# Patient Record
Sex: FEMALE | Race: WHITE | Employment: FULL TIME | URBAN - METROPOLITAN AREA
[De-identification: names, ages, dates, MRNs, and addresses within clinical notes are randomized per-mention and may not be internally consistent; named-entity substitution may affect disease eponyms.]

---

## 2017-01-17 RX ORDER — BUPROPION HYDROCHLORIDE 150 MG/1
TABLET ORAL
Qty: 30 TABLET | Refills: 1 | Status: SHIPPED | OUTPATIENT
Start: 2017-01-17 | End: 2017-03-14 | Stop reason: SDUPTHER

## 2017-03-14 ENCOUNTER — OFFICE VISIT (OUTPATIENT)
Dept: FAMILY MEDICINE CLINIC | Age: 57
End: 2017-03-14

## 2017-03-14 VITALS
RESPIRATION RATE: 12 BRPM | HEART RATE: 76 BPM | SYSTOLIC BLOOD PRESSURE: 138 MMHG | HEIGHT: 65 IN | DIASTOLIC BLOOD PRESSURE: 86 MMHG | WEIGHT: 188.38 LBS | BODY MASS INDEX: 31.39 KG/M2 | TEMPERATURE: 97.8 F

## 2017-03-14 DIAGNOSIS — R29.898 LEFT ARM WEAKNESS: ICD-10-CM

## 2017-03-14 DIAGNOSIS — M75.102 ROTATOR CUFF TEAR ARTHROPATHY, LEFT: ICD-10-CM

## 2017-03-14 DIAGNOSIS — I10 ESSENTIAL HYPERTENSION: Primary | ICD-10-CM

## 2017-03-14 DIAGNOSIS — M12.812 ROTATOR CUFF TEAR ARTHROPATHY, LEFT: ICD-10-CM

## 2017-03-14 DIAGNOSIS — E03.9 HYPOTHYROIDISM, UNSPECIFIED TYPE: ICD-10-CM

## 2017-03-14 PROCEDURE — 99213 OFFICE O/P EST LOW 20 MIN: CPT | Performed by: FAMILY MEDICINE

## 2017-03-14 RX ORDER — LEVOTHYROXINE SODIUM 0.05 MG/1
50 TABLET ORAL DAILY
Qty: 30 TABLET | Refills: 5 | Status: SHIPPED | OUTPATIENT
Start: 2017-03-14

## 2017-03-14 RX ORDER — ATORVASTATIN CALCIUM 10 MG/1
TABLET, FILM COATED ORAL
Qty: 30 TABLET | Refills: 5 | Status: SHIPPED | OUTPATIENT
Start: 2017-03-14

## 2017-03-14 RX ORDER — TRAMADOL HYDROCHLORIDE 50 MG/1
TABLET ORAL
Qty: 120 TABLET | Refills: 1 | Status: SHIPPED | OUTPATIENT
Start: 2017-03-14

## 2017-03-14 RX ORDER — BENAZEPRIL HYDROCHLORIDE 20 MG/1
TABLET ORAL
Qty: 30 TABLET | Refills: 5 | Status: SHIPPED | OUTPATIENT
Start: 2017-03-14

## 2017-03-14 RX ORDER — BUPROPION HYDROCHLORIDE 150 MG/1
TABLET ORAL
Qty: 30 TABLET | Refills: 5 | Status: SHIPPED | OUTPATIENT
Start: 2017-03-14 | End: 2017-06-07

## 2017-06-07 ENCOUNTER — TELEPHONE (OUTPATIENT)
Dept: FAMILY MEDICINE CLINIC | Age: 57
End: 2017-06-07

## 2017-06-07 RX ORDER — CITALOPRAM 20 MG/1
20 TABLET ORAL DAILY
Qty: 30 TABLET | Refills: 3 | Status: SHIPPED | OUTPATIENT
Start: 2017-06-07

## 2017-07-10 ENCOUNTER — TELEPHONE (OUTPATIENT)
Dept: FAMILY MEDICINE CLINIC | Age: 57
End: 2017-07-10

## 2017-07-10 RX ORDER — HYDROCHLOROTHIAZIDE 25 MG/1
TABLET ORAL
Qty: 30 TABLET | Refills: 2 | Status: SHIPPED | OUTPATIENT
Start: 2017-07-10

## 2017-08-08 RX ORDER — TRAMADOL HYDROCHLORIDE 50 MG/1
TABLET ORAL
Qty: 120 TABLET | Refills: 1 | OUTPATIENT
Start: 2017-08-08

## 2017-09-21 ENCOUNTER — OFFICE VISIT (OUTPATIENT)
Dept: INTERNAL MEDICINE | Facility: MEDICAL CENTER | Age: 57
End: 2017-09-21
Payer: COMMERCIAL

## 2017-09-21 VITALS
TEMPERATURE: 96.6 F | WEIGHT: 198.5 LBS | HEIGHT: 64 IN | OXYGEN SATURATION: 93 % | SYSTOLIC BLOOD PRESSURE: 146 MMHG | DIASTOLIC BLOOD PRESSURE: 98 MMHG | BODY MASS INDEX: 33.89 KG/M2 | HEART RATE: 71 BPM

## 2017-09-21 DIAGNOSIS — M19.012 OSTEOARTHRITIS OF LEFT SHOULDER, UNSPECIFIED OSTEOARTHRITIS TYPE: ICD-10-CM

## 2017-09-21 DIAGNOSIS — I10 ESSENTIAL HYPERTENSION: ICD-10-CM

## 2017-09-21 DIAGNOSIS — Z76.0 MEDICATION REFILL: ICD-10-CM

## 2017-09-21 DIAGNOSIS — F32.A DEPRESSION, UNSPECIFIED DEPRESSION TYPE: ICD-10-CM

## 2017-09-21 DIAGNOSIS — Z76.89 ESTABLISHING CARE WITH NEW DOCTOR, ENCOUNTER FOR: ICD-10-CM

## 2017-09-21 DIAGNOSIS — E03.9 HYPOTHYROIDISM, UNSPECIFIED TYPE: ICD-10-CM

## 2017-09-21 DIAGNOSIS — K21.9 GASTROESOPHAGEAL REFLUX DISEASE, ESOPHAGITIS PRESENCE NOT SPECIFIED: ICD-10-CM

## 2017-09-21 DIAGNOSIS — E66.9 OBESITY (BMI 30-39.9): ICD-10-CM

## 2017-09-21 PROCEDURE — 99000 SPECIMEN HANDLING OFFICE-LAB: CPT | Mod: GC | Performed by: INTERNAL MEDICINE

## 2017-09-21 PROCEDURE — 99204 OFFICE O/P NEW MOD 45 MIN: CPT | Mod: GC | Performed by: INTERNAL MEDICINE

## 2017-09-21 RX ORDER — TRAMADOL HYDROCHLORIDE 50 MG/1
50 TABLET ORAL EVERY 4 HOURS PRN
Qty: 10 TAB | Refills: 0 | Status: SHIPPED | OUTPATIENT
Start: 2017-09-21 | End: 2018-11-12

## 2017-09-21 RX ORDER — BENAZEPRIL HYDROCHLORIDE 20 MG/1
20 TABLET ORAL DAILY
COMMUNITY
End: 2017-12-19 | Stop reason: SDUPTHER

## 2017-09-21 RX ORDER — HYDROCHLOROTHIAZIDE 25 MG/1
25 TABLET ORAL DAILY
COMMUNITY
End: 2017-09-21 | Stop reason: SDUPTHER

## 2017-09-21 RX ORDER — BUPROPION HYDROCHLORIDE 150 MG/1
150 TABLET, EXTENDED RELEASE ORAL DAILY
COMMUNITY
End: 2017-09-21

## 2017-09-21 RX ORDER — DICLOFENAC SODIUM 75 MG/1
75 TABLET, DELAYED RELEASE ORAL 2 TIMES DAILY
COMMUNITY
End: 2018-11-12

## 2017-09-21 RX ORDER — TRAMADOL HYDROCHLORIDE 50 MG/1
50 TABLET ORAL EVERY 4 HOURS PRN
COMMUNITY
End: 2017-09-21

## 2017-09-21 RX ORDER — CITALOPRAM 20 MG/1
20 TABLET ORAL DAILY
Qty: 30 TAB | Refills: 1 | Status: SHIPPED | OUTPATIENT
Start: 2017-09-21 | End: 2017-12-19 | Stop reason: SDUPTHER

## 2017-09-21 RX ORDER — OMEPRAZOLE 20 MG/1
20 CAPSULE, DELAYED RELEASE ORAL DAILY
COMMUNITY
End: 2017-09-21

## 2017-09-21 RX ORDER — HYDROCHLOROTHIAZIDE 25 MG/1
25 TABLET ORAL DAILY
Qty: 30 TAB | Refills: 1 | Status: SHIPPED | OUTPATIENT
Start: 2017-09-21 | End: 2017-12-19 | Stop reason: SDUPTHER

## 2017-09-21 RX ORDER — CITALOPRAM 20 MG/1
20 TABLET ORAL DAILY
COMMUNITY
End: 2017-09-21 | Stop reason: SDUPTHER

## 2017-09-21 RX ORDER — LEVOTHYROXINE SODIUM 0.05 MG/1
50 TABLET ORAL
COMMUNITY
End: 2017-12-19 | Stop reason: SDUPTHER

## 2017-09-21 ASSESSMENT — PATIENT HEALTH QUESTIONNAIRE - PHQ9: CLINICAL INTERPRETATION OF PHQ2 SCORE: 0

## 2017-09-21 NOTE — PROGRESS NOTES
New Patient to Establish    Reason to establish: New patient to establish    CC: Establish care with new doctor, and consult about hypertension and weight gain.    HPI: 57 y.o. Female with past medical history of hypertension, hypothyroidism, bilateral knee replacement, osteoarthritis, obesity, and GERD. Presents to the clinic to establish care and to consult about hypertension and weight gain. The patient moved recently to [place] on 8/2017 because of new job at [employer].   The last mammography was on 2016 and it was normal. The patient refused colonoscopy but she had a negative.  Pt denies headache, blurred vision, fatigue, constipation, cold intolerance, skin changes, or hair loss.    Patient Active Problem List    Diagnosis Date Noted   • Essential hypertension 09/21/2017   • Osteoarthritis of left shoulder 09/21/2017   • Obesity (BMI 30-39.9) 09/21/2017   • Establishing care with new doctor, encounter for 09/21/2017   • Hypothyroidism 09/21/2017   • Depression 09/21/2017       Past Medical History:   Diagnosis Date   • HTN (hypertension)    • Hypothyroidism        Current Outpatient Prescriptions   Medication Sig Dispense Refill   • levothyroxine (SYNTHROID) 50 MCG Tab Take 50 mcg by mouth Every morning on an empty stomach.     • benazepril (LOTENSIN) 20 MG Tab Take 20 mg by mouth every day.     • diclofenac EC (VOLTAREN) 75 MG Tablet Delayed Response Take 75 mg by mouth 2 times a day.     • hydrochlorothiazide (HYDRODIURIL) 25 MG Tab Take 1 Tab by mouth every day. 30 Tab 1   • citalopram (CELEXA) 20 MG Tab Take 1 Tab by mouth every day. 30 Tab 1   • tramadol (ULTRAM) 50 MG Tab Take 1 Tab by mouth every four hours as needed. 10 Tab 0     No current facility-administered medications for this visit.        Allergies as of 09/21/2017   • (No Known Allergies)       Social History     Social History   • Marital status: Single     Spouse name: N/A   • Number of children: N/A   • Years of education: N/A     Occupational  "History   • Not on file.     Social History Main Topics   • Smoking status: Former Smoker     Packs/day: 1.50     Years: 25.00     Types: Cigarettes     Quit date: 10/2004   • Smokeless tobacco: Never Used   • Alcohol use Not on file   • Drug use: Unknown   • Sexual activity: Not on file     Other Topics Concern   • Not on file     Social History Narrative   • No narrative on file       Family History   Problem Relation Age of Onset   • Diabetes Mother    • Hypertension Mother    • Hypertension Father        No past surgical history on file.    ROS: As per HPI. Additional pertinent symptoms as noted below.    All others negative    /98   Pulse 71   Temp 35.9 °C (96.6 °F)   Ht 1.626 m (5' 4\")   Wt 90 kg (198 lb 8 oz)   SpO2 93%   BMI 34.07 kg/m²     Physical Exam  General:  Alert and oriented, No apparent distress.    Eyes: Pupils equal and reactive. No scleral icterus.    Throat: Clear no erythema or exudates noted.    Neck: Supple. No lymphadenopathy noted. Thyroid not enlarged.    Lungs: Clear to auscultation and percussion bilaterally.    Cardiovascular: Regular rate and rhythm. No murmurs, rubs or gallops.    Abdomen:  Benign. No rebound or guarding noted.    Extremities: No clubbing, cyanosis, edema. Tenderness on palpation of the left shoulder joint, no edema , positive limitation of movement of the joint, numbness of the lateral side of the arm and forearm, no muscle weakness or fasciculation.    Skin: Clear. No rash or suspicious skin lesions noted.    Other:     Note: I have reviewed all pertinent labs and diagnostic tests associated with this visit with specific comments listed under the assessment and plan below    Assessment and Plan    1. Essential hypertension  - Hx of HTN controlled by antihypertensive medications  - On hydrochlorothiazide 25 mg daily, and benazepril 20 mg daily.  - Today Bp reading is 146/98.  - Denies headache, blurred vision, chest pain, nausea or vomiting.  - " Impression: under controlled hypertension  Plan  - Educate the patient on how to measure the Bp at home and bring a hypertension log with her on next visit.  - Encourage the pt to loss weight as that will help in controlling the blood pressure and improve her health in general.  - CBC, CMP and follow up in one month.    2. Osteoarthritis of left shoulder, unspecified osteoarthritis type  - Hx of chronic pain in the left shoulder pain  - No Hx of trauma or dislocation.  - Hx of previous evaluation by Orthopedic surgeon in [place], he advice to do shoulder joint replacement.  Plan  - Referral to Orthopedic surgeon in [place] to evaluate and treat.  - Refill Tramadol tablet.    3. Obesity (BMI 30-39.9)  - Current BMI is 34.07.  - Hx of depending on junk food for daily basis.  - After moving to [place] on 8/2017, the patient starts eating more vegetables and healthy food.  Plan  - Encourage and educate the patient about the benefit of eating healthy food and losing weight.  - Lipid panel  - CBC and CMP  - Follow up in one month    4. Establishing care with new doctor, encounter for  - Pt recently moved to [place] on 8/2017 and she wants to establish care with new physician.  Plan  - Control Bp  - Control hypothyroidism  - CBC, CMP, TSH, and Lipid panel.    5. Hypothyroidism, unspecified type  - On Levothyroxine  - last TSH was 5 months ago (Pt does not remember the result)  - Denies fatigue, constipation, cold intolerance, or hair falling.  - Recent history of weight gain ( 9 pounds).  Plan  - Repeat the TSH and follow up.    6. Depression, unspecified depression type  - Hx of depression that is well controlled with Citalopram.  - Denies depressed mood, suicide attempt or ideation.  - Hx of Whiskey abuse and methamphetamine abuse (Sober since 2004).  Plan  - Refill medication.  - Follow up.    7. Medication refill  - Pt establishing care and needs refill on medications  Plan  - Refill medication and adjust the dose after receiving  the lab results and evaluation the response of the patient.      Followup: Return in about 1 month (around 10/21/2017).    Risk Assessment (discuss potential complications a function of chronic problems): risk assessment has been discussed with the patient.    Complexity (discuss number of co-morbidities): Co-Morbidities have been discussed with the patient.    Signed by: Hilaria Santillan M.D.

## 2017-09-21 NOTE — LETTER
[facility]  Hilaria Santillan M.D.  [address]  [place] [address]  Fax: [phone]   Authorization for Release/Disclosure of   Protected Health Information   Name: ZAINAB GUY : 1960 SSN: [id number]   Address: [address] [place] [address]  [place] [address] Phone:    [phone] (home)    I authorize the entity listed below to release/disclose the PHI below to:   [facility]/Hilaria Santillan M.D. and Hilaria Santillan M.D.   Provider or Entity Name: Dr Bravo     [place], [place]   Phone:      Fax:     Reason for request: continuity of care   Information to be released:    [ X ] LAST COLONOSCOPY,  including any PATH REPORT and follow-up  [  ] LAST FIT/COLOGUARD RESULT [X ] LAST DEXA  [ X ] LAST MAMMOGRAM  [X ] LAST PAP  [ X ] LAST LABS [  ] RETINA EXAM REPORT  [ X ] IMMUNIZATION RECORDS  [X ] Release all info      [  ] Check here and initial the line next to each item to release ALL health information INCLUDING  _____ Care and treatment for drug and / or alcohol abuse  _____ HIV testing, infection status, or AIDS  _____ Genetic Testing    DATES OF SERVICE OR TIME PERIOD TO BE DISCLOSED: _____________  I understand and acknowledge that:  * This Authorization may be revoked at any time by you in writing, except if your health information has already been used or disclosed.  * Your health information that will be used or disclosed as a result of you signing this authorization could be re-disclosed by the recipient. If this occurs, your re-disclosed health information may no longer be protected by State or Federal laws.  * You may refuse to sign this Authorization. Your refusal will not affect your ability to obtain treatment.  * This Authorization becomes effective upon signing and will  on (date) __________.      If no date is indicated, this Authorization will  one (1) year from the signature date.    Name: Zainab Guy    Signature:   Date:     9/21/2017       PLEASE FAX REQUESTED RECORDS BACK TO: [phone]

## 2017-10-12 ENCOUNTER — APPOINTMENT (OUTPATIENT)
Dept: INTERNAL MEDICINE | Facility: MEDICAL CENTER | Age: 57
End: 2017-10-12
Payer: COMMERCIAL

## 2017-12-19 ENCOUNTER — OFFICE VISIT (OUTPATIENT)
Dept: MEDICAL GROUP | Facility: MEDICAL CENTER | Age: 57
End: 2017-12-19
Payer: COMMERCIAL

## 2017-12-19 ENCOUNTER — HOSPITAL ENCOUNTER (OUTPATIENT)
Dept: LAB | Facility: MEDICAL CENTER | Age: 57
End: 2017-12-19
Attending: NURSE PRACTITIONER
Payer: COMMERCIAL

## 2017-12-19 VITALS
OXYGEN SATURATION: 95 % | SYSTOLIC BLOOD PRESSURE: 142 MMHG | RESPIRATION RATE: 16 BRPM | WEIGHT: 203 LBS | TEMPERATURE: 97.2 F | HEIGHT: 64 IN | BODY MASS INDEX: 34.66 KG/M2 | DIASTOLIC BLOOD PRESSURE: 74 MMHG | HEART RATE: 80 BPM

## 2017-12-19 DIAGNOSIS — E66.9 OBESITY (BMI 30-39.9): ICD-10-CM

## 2017-12-19 DIAGNOSIS — M15.9 OSTEOARTHRITIS OF MULTIPLE JOINTS, UNSPECIFIED OSTEOARTHRITIS TYPE: ICD-10-CM

## 2017-12-19 DIAGNOSIS — K21.9 GASTROESOPHAGEAL REFLUX DISEASE WITHOUT ESOPHAGITIS: ICD-10-CM

## 2017-12-19 DIAGNOSIS — I10 ESSENTIAL HYPERTENSION: ICD-10-CM

## 2017-12-19 DIAGNOSIS — E03.4 HYPOTHYROIDISM DUE TO ACQUIRED ATROPHY OF THYROID: ICD-10-CM

## 2017-12-19 DIAGNOSIS — F33.42 RECURRENT MAJOR DEPRESSIVE DISORDER, IN FULL REMISSION (HCC): ICD-10-CM

## 2017-12-19 DIAGNOSIS — Z12.39 SCREENING FOR BREAST CANCER: ICD-10-CM

## 2017-12-19 PROBLEM — F32.A DEPRESSION: Status: RESOLVED | Noted: 2017-09-21 | Resolved: 2017-12-19

## 2017-12-19 PROBLEM — E03.9 HYPOTHYROIDISM: Status: RESOLVED | Noted: 2017-09-21 | Resolved: 2017-12-19

## 2017-12-19 PROBLEM — M19.012 OSTEOARTHRITIS OF LEFT SHOULDER: Status: RESOLVED | Noted: 2017-09-21 | Resolved: 2017-12-19

## 2017-12-19 PROBLEM — Z76.89 ESTABLISHING CARE WITH NEW DOCTOR, ENCOUNTER FOR: Status: RESOLVED | Noted: 2017-09-21 | Resolved: 2017-12-19

## 2017-12-19 LAB
ALBUMIN SERPL BCP-MCNC: 4 G/DL (ref 3.2–4.9)
ALBUMIN/GLOB SERPL: 1.4 G/DL
ALP SERPL-CCNC: 106 U/L (ref 30–99)
ALT SERPL-CCNC: 144 U/L (ref 2–50)
ANION GAP SERPL CALC-SCNC: 11 MMOL/L (ref 0–11.9)
AST SERPL-CCNC: 59 U/L (ref 12–45)
BILIRUB SERPL-MCNC: 0.4 MG/DL (ref 0.1–1.5)
BUN SERPL-MCNC: 16 MG/DL (ref 8–22)
CALCIUM SERPL-MCNC: 9.4 MG/DL (ref 8.5–10.5)
CHLORIDE SERPL-SCNC: 107 MMOL/L (ref 96–112)
CHOLEST SERPL-MCNC: 378 MG/DL (ref 100–199)
CO2 SERPL-SCNC: 22 MMOL/L (ref 20–33)
CREAT SERPL-MCNC: 0.62 MG/DL (ref 0.5–1.4)
GFR SERPL CREATININE-BSD FRML MDRD: >60 ML/MIN/1.73 M 2
GLOBULIN SER CALC-MCNC: 2.9 G/DL (ref 1.9–3.5)
GLUCOSE SERPL-MCNC: 95 MG/DL (ref 65–99)
HDLC SERPL-MCNC: 30 MG/DL
LDLC SERPL CALC-MCNC: ABNORMAL MG/DL
POTASSIUM SERPL-SCNC: 3.9 MMOL/L (ref 3.6–5.5)
PROT SERPL-MCNC: 6.9 G/DL (ref 6–8.2)
SODIUM SERPL-SCNC: 140 MMOL/L (ref 135–145)
TRIGL SERPL-MCNC: 731 MG/DL (ref 0–149)
TSH SERPL DL<=0.005 MIU/L-ACNC: 2.93 UIU/ML (ref 0.38–5.33)

## 2017-12-19 PROCEDURE — 84443 ASSAY THYROID STIM HORMONE: CPT

## 2017-12-19 PROCEDURE — 80053 COMPREHEN METABOLIC PANEL: CPT

## 2017-12-19 PROCEDURE — 80061 LIPID PANEL: CPT

## 2017-12-19 PROCEDURE — 99214 OFFICE O/P EST MOD 30 MIN: CPT | Performed by: NURSE PRACTITIONER

## 2017-12-19 PROCEDURE — 36415 COLL VENOUS BLD VENIPUNCTURE: CPT

## 2017-12-19 RX ORDER — LEVOTHYROXINE SODIUM 0.05 MG/1
50 TABLET ORAL
Qty: 30 TAB | Refills: 11 | Status: SHIPPED | OUTPATIENT
Start: 2017-12-19 | End: 2018-11-26 | Stop reason: SDUPTHER

## 2017-12-19 RX ORDER — HYDROCHLOROTHIAZIDE 25 MG/1
25 TABLET ORAL DAILY
Qty: 30 TAB | Refills: 11 | Status: SHIPPED | OUTPATIENT
Start: 2017-12-19 | End: 2018-11-26 | Stop reason: SDUPTHER

## 2017-12-19 RX ORDER — BENAZEPRIL HYDROCHLORIDE 20 MG/1
20 TABLET ORAL DAILY
Qty: 30 TAB | Refills: 11 | Status: SHIPPED | OUTPATIENT
Start: 2017-12-19 | End: 2018-11-26 | Stop reason: SDUPTHER

## 2017-12-19 RX ORDER — CITALOPRAM 20 MG/1
20 TABLET ORAL DAILY
Qty: 30 TAB | Refills: 11 | Status: SHIPPED | OUTPATIENT
Start: 2017-12-19 | End: 2018-11-26 | Stop reason: SDUPTHER

## 2017-12-19 RX ORDER — OMEPRAZOLE 20 MG/1
20 CAPSULE, DELAYED RELEASE ORAL DAILY
Qty: 30 CAP | COMMUNITY
Start: 2017-12-19 | End: 2023-10-01

## 2017-12-19 ASSESSMENT — ENCOUNTER SYMPTOMS
DEPRESSION: 1
HEARTBURN: 1

## 2017-12-19 NOTE — PROGRESS NOTES
Subjective:      Zainab Guy is a 57 y.o. female who presents with Establish Care and Medication Refill            HPI Zainab Guy Is here today to establish care and for need of medications and lab work. She recently moved to the area from [place] and was seen once at the [facility] medical clinic.      1. Essential hypertension  Patient currently uses benazepril 20 mg and HCTZ 25 mg. Her blood pressure appears mildly elevated but she states this is because of anxiety and her home readings are running better. She would possibly like to switch off HCTZ in the future because she has occasional urinary incontinence due to urgency.    2. Osteoarthritis of multiple joints, unspecified osteoarthritis type  Patient followed by orthopedics and they provide her diclofenac and tramadol which she uses for multiple joint pain but she states she has no rheumatoid arthritis.    3. Hypothyroidism due to acquired atrophy of thyroid  Patient currently on levothyroxine 50 µg and is due for TSH testing.    4. Recurrent major depressive disorder, in full remission (CMS-HCC)  Patient takes citalopram 20 mg daily and finds it helpful.    5. Gastroesophageal reflux disease without esophagitis  Patient uses over-the-counter omeprazole and does not feel she needs anything else.      Social History   Substance Use Topics   • Smoking status: Former Smoker     Packs/day: 1.50     Years: 25.00     Types: Cigarettes     Quit date: 10/2004   • Smokeless tobacco: Never Used   • Alcohol use No     Current Outpatient Prescriptions   Medication Sig Dispense Refill   • omeprazole (PRILOSEC) 20 MG delayed-release capsule Take 1 Cap by mouth every day. 30 Cap    • levothyroxine (SYNTHROID) 50 MCG Tab Take 1 Tab by mouth Every morning on an empty stomach. 30 Tab 11   • benazepril (LOTENSIN) 20 MG Tab Take 1 Tab by mouth every day. 30 Tab 11   • hydrochlorothiazide (HYDRODIURIL) 25 MG Tab Take 1 Tab by mouth every day. 30 Tab 11   • citalopram  "(CELEXA) 20 MG Tab Take 1 Tab by mouth every day. 30 Tab 11   • diclofenac EC (VOLTAREN) 75 MG Tablet Delayed Response Take 75 mg by mouth 2 times a day.     • tramadol (ULTRAM) 50 MG Tab Take 1 Tab by mouth every four hours as needed. 10 Tab 0     No current facility-administered medications for this visit.      Family History   Problem Relation Age of Onset   • Diabetes Mother    • Hypertension Mother    • Hypertension Father      Past Medical History:   Diagnosis Date   • GERD (gastroesophageal reflux disease)    • HTN (hypertension)    • Hypothyroidism    • Obesity    • Osteoarthritis        Review of Systems   Gastrointestinal: Positive for heartburn.   Musculoskeletal: Positive for joint pain.   Psychiatric/Behavioral: Positive for depression.   All other systems reviewed and are negative.         Objective:     /74   Pulse 80   Temp 36.2 °C (97.2 °F)   Resp 16   Ht 1.626 m (5' 4\")   Wt 92.1 kg (203 lb)   SpO2 95%   BMI 34.84 kg/m²      Physical Exam   Constitutional: She is oriented to person, place, and time. She appears well-developed and well-nourished. No distress.   HENT:   Head: Normocephalic and atraumatic.   Right Ear: External ear normal.   Left Ear: External ear normal.   Nose: Nose normal.   Eyes: Right eye exhibits no discharge. Left eye exhibits no discharge.   Neck: Normal range of motion. Neck supple. No thyromegaly present.   Cardiovascular: Normal rate, regular rhythm and normal heart sounds.  Exam reveals no gallop and no friction rub.    No murmur heard.  Pulmonary/Chest: Effort normal and breath sounds normal. She has no wheezes. She has no rales.   Musculoskeletal: She exhibits no edema or tenderness.   Neurological: She is alert and oriented to person, place, and time. She displays normal reflexes.   Skin: Skin is warm and dry. No rash noted. She is not diaphoretic.   Psychiatric: She has a normal mood and affect. Her behavior is normal. Judgment and thought content normal. "   Nursing note and vitals reviewed.              Assessment/Plan:     1. Essential hypertension  Patient will continue on same medication for now but I advised her that if her blood pressure continues to above 140/90, we should consider other medication. I advised her that we should also consider decreasing or stopping her HCTZ which may be contributing to her urinary incontinence. She will make a follow-up appointment for this.  - COMP METABOLIC PANEL; Future  - LIPID PROFILE; Future  - benazepril (LOTENSIN) 20 MG Tab; Take 1 Tab by mouth every day.  Dispense: 30 Tab; Refill: 11  - hydrochlorothiazide (HYDRODIURIL) 25 MG Tab; Take 1 Tab by mouth every day.  Dispense: 30 Tab; Refill: 11    2. Osteoarthritis of multiple joints, unspecified osteoarthritis type  Patient states she will get her tramadol from orthopedics because it is a controlled substance. She did do a recent urine drug screen for [facility] which was appropriate. I advised her to use her anti-inflammatories only when needed to prevent GI upset.    3. Hypothyroidism due to acquired atrophy of thyroid  Medicine refilled but she needs a TSH to evaluate her needs.  - TSH; Future  - levothyroxine (SYNTHROID) 50 MCG Tab; Take 1 Tab by mouth Every morning on an empty stomach.  Dispense: 30 Tab; Refill: 11    4. Recurrent major depressive disorder, in full remission (CMS-HCC)  Patient states she is happy with Celexa and does not want counseling.  - citalopram (CELEXA) 20 MG Tab; Take 1 Tab by mouth every day.  Dispense: 30 Tab; Refill: 11    5. Gastroesophageal reflux disease without esophagitis  Patient happy with over-the-counter medicine.  - omeprazole (PRILOSEC) 20 MG delayed-release capsule; Take 1 Cap by mouth every day.  Dispense: 30 Cap    6. Screening for breast cancer    - MA-MAMMO SCREENING BILAT W/TEJAS W/CAD; Future    7. Obesity (BMI 30-39.9)    - Patient identified as having weight management issue.  Appropriate orders and counseling given.

## 2018-06-04 LAB
ALBUMIN SERPL-MCNC: 3.8 G/DL (ref 3.4–5)
ALP SERPL-CCNC: 92 U/L (ref 45–117)
ALT SERPL-CCNC: 55 U/L (ref 12–78)
ANION GAP SERPL CALC-SCNC: 8 MMOL/L (ref 5–15)
BILIRUB SERPL-MCNC: 0.6 MG/DL (ref 0.2–1)
CALCIUM SERPL-MCNC: 9.1 MG/DL (ref 8.5–10.1)
CHLORIDE SERPL-SCNC: 106 MMOL/L (ref 98–107)
CREAT SERPL-MCNC: 0.84 MG/DL (ref 0.55–1.02)
PROT SERPL-MCNC: 7.7 G/DL (ref 6.4–8.2)

## 2018-06-07 ENCOUNTER — HOSPITAL ENCOUNTER (INPATIENT)
Dept: HOSPITAL 8 - ORIP | Age: 58
Discharge: HOME | DRG: 483 | End: 2018-06-07
Attending: ORTHOPAEDIC SURGERY | Admitting: ORTHOPAEDIC SURGERY
Payer: COMMERCIAL

## 2018-06-07 VITALS — DIASTOLIC BLOOD PRESSURE: 86 MMHG | SYSTOLIC BLOOD PRESSURE: 132 MMHG

## 2018-06-07 VITALS — BODY MASS INDEX: 32.91 KG/M2 | HEIGHT: 65 IN | WEIGHT: 197.53 LBS

## 2018-06-07 DIAGNOSIS — J45.909: ICD-10-CM

## 2018-06-07 DIAGNOSIS — M19.012: Primary | ICD-10-CM

## 2018-06-07 DIAGNOSIS — Z96.653: ICD-10-CM

## 2018-06-07 DIAGNOSIS — Z90.710: ICD-10-CM

## 2018-06-07 DIAGNOSIS — I10: ICD-10-CM

## 2018-06-07 DIAGNOSIS — M75.22: ICD-10-CM

## 2018-06-07 PROCEDURE — 0RRK0JZ REPLACEMENT OF LEFT SHOULDER JOINT WITH SYNTHETIC SUBSTITUTE, OPEN APPROACH: ICD-10-PCS | Performed by: ORTHOPAEDIC SURGERY

## 2018-06-07 PROCEDURE — 36415 COLL VENOUS BLD VENIPUNCTURE: CPT

## 2018-06-07 PROCEDURE — C1713 ANCHOR/SCREW BN/BN,TIS/BN: HCPCS

## 2018-06-07 PROCEDURE — 0LS40ZZ REPOSITION LEFT UPPER ARM TENDON, OPEN APPROACH: ICD-10-PCS | Performed by: ORTHOPAEDIC SURGERY

## 2018-06-07 PROCEDURE — C1776 JOINT DEVICE (IMPLANTABLE): HCPCS

## 2018-06-07 PROCEDURE — 80053 COMPREHEN METABOLIC PANEL: CPT

## 2018-06-07 RX ADMIN — OXYCODONE HYDROCHLORIDE AND ACETAMINOPHEN SCH TAB: 10; 325 TABLET ORAL at 14:39

## 2018-06-07 RX ADMIN — OXYCODONE HYDROCHLORIDE AND ACETAMINOPHEN SCH TAB: 10; 325 TABLET ORAL at 11:00

## 2018-11-12 ENCOUNTER — APPOINTMENT (OUTPATIENT)
Dept: RADIOLOGY | Facility: MEDICAL CENTER | Age: 58
End: 2018-11-12
Attending: EMERGENCY MEDICINE
Payer: COMMERCIAL

## 2018-11-12 ENCOUNTER — APPOINTMENT (OUTPATIENT)
Dept: RADIOLOGY | Facility: MEDICAL CENTER | Age: 58
End: 2018-11-12
Attending: INTERNAL MEDICINE
Payer: COMMERCIAL

## 2018-11-12 ENCOUNTER — APPOINTMENT (OUTPATIENT)
Dept: CARDIOLOGY | Facility: MEDICAL CENTER | Age: 58
End: 2018-11-12
Attending: INTERNAL MEDICINE
Payer: COMMERCIAL

## 2018-11-12 ENCOUNTER — HOSPITAL ENCOUNTER (OUTPATIENT)
Facility: MEDICAL CENTER | Age: 58
End: 2018-11-13
Attending: EMERGENCY MEDICINE | Admitting: INTERNAL MEDICINE
Payer: COMMERCIAL

## 2018-11-12 ENCOUNTER — TELEPHONE (OUTPATIENT)
Dept: MEDICAL GROUP | Facility: MEDICAL CENTER | Age: 58
End: 2018-11-12

## 2018-11-12 ENCOUNTER — OFFICE VISIT (OUTPATIENT)
Dept: MEDICAL GROUP | Facility: MEDICAL CENTER | Age: 58
End: 2018-11-12
Payer: COMMERCIAL

## 2018-11-12 VITALS
HEIGHT: 64 IN | TEMPERATURE: 98.2 F | WEIGHT: 201 LBS | OXYGEN SATURATION: 95 % | BODY MASS INDEX: 34.31 KG/M2 | RESPIRATION RATE: 16 BRPM | DIASTOLIC BLOOD PRESSURE: 76 MMHG | SYSTOLIC BLOOD PRESSURE: 130 MMHG | HEART RATE: 83 BPM

## 2018-11-12 DIAGNOSIS — E78.2 MIXED HYPERLIPIDEMIA: ICD-10-CM

## 2018-11-12 DIAGNOSIS — R07.89 CHEST PRESSURE: ICD-10-CM

## 2018-11-12 DIAGNOSIS — R74.01 ELEVATED TRANSAMINASE LEVEL: ICD-10-CM

## 2018-11-12 DIAGNOSIS — E03.4 HYPOTHYROIDISM DUE TO ACQUIRED ATROPHY OF THYROID: ICD-10-CM

## 2018-11-12 DIAGNOSIS — I10 ESSENTIAL HYPERTENSION: ICD-10-CM

## 2018-11-12 PROBLEM — R01.1 NEWLY RECOGNIZED HEART MURMUR: Status: ACTIVE | Noted: 2018-11-12

## 2018-11-12 PROBLEM — R07.9 PAIN IN THE CHEST: Status: ACTIVE | Noted: 2018-11-12

## 2018-11-12 LAB
ALBUMIN SERPL BCP-MCNC: 4.4 G/DL (ref 3.2–4.9)
ALBUMIN/GLOB SERPL: 1.4 G/DL
ALP SERPL-CCNC: 87 U/L (ref 30–99)
ALT SERPL-CCNC: 77 U/L (ref 2–50)
ANION GAP SERPL CALC-SCNC: 11 MMOL/L (ref 0–11.9)
APTT PPP: 27.6 SEC (ref 24.7–36)
AST SERPL-CCNC: 47 U/L (ref 12–45)
BASOPHILS # BLD AUTO: 0.6 % (ref 0–1.8)
BASOPHILS # BLD: 0.04 K/UL (ref 0–0.12)
BILIRUB SERPL-MCNC: 0.5 MG/DL (ref 0.1–1.5)
BNP SERPL-MCNC: 24 PG/ML (ref 0–100)
BUN SERPL-MCNC: 14 MG/DL (ref 8–22)
CALCIUM SERPL-MCNC: 10 MG/DL (ref 8.5–10.5)
CHLORIDE SERPL-SCNC: 104 MMOL/L (ref 96–112)
CK SERPL-CCNC: 123 U/L (ref 0–154)
CO2 SERPL-SCNC: 24 MMOL/L (ref 20–33)
CORTIS SERPL-MCNC: 3.2 UG/DL (ref 0–23)
CREAT SERPL-MCNC: 0.72 MG/DL (ref 0.5–1.4)
D DIMER PPP IA.FEU-MCNC: 0.43 UG/ML (FEU) (ref 0–0.5)
EKG IMPRESSION: NORMAL
EKG IMPRESSION: NORMAL
EOSINOPHIL # BLD AUTO: 0.21 K/UL (ref 0–0.51)
EOSINOPHIL NFR BLD: 3.1 % (ref 0–6.9)
ERYTHROCYTE [DISTWIDTH] IN BLOOD BY AUTOMATED COUNT: 41.4 FL (ref 35.9–50)
FERRITIN SERPL-MCNC: 44.4 NG/ML (ref 10–291)
GLOBULIN SER CALC-MCNC: 3.2 G/DL (ref 1.9–3.5)
GLUCOSE SERPL-MCNC: 95 MG/DL (ref 65–99)
HAV IGM SERPL QL IA: NEGATIVE
HBV CORE IGM SER QL: NEGATIVE
HBV SURFACE AG SER QL: NEGATIVE
HCT VFR BLD AUTO: 41.2 % (ref 37–47)
HCV AB SER QL: NEGATIVE
HGB BLD-MCNC: 14 G/DL (ref 12–16)
IMM GRANULOCYTES # BLD AUTO: 0.02 K/UL (ref 0–0.11)
IMM GRANULOCYTES NFR BLD AUTO: 0.3 % (ref 0–0.9)
INR PPP: 1 (ref 0.87–1.13)
LIPASE SERPL-CCNC: 43 U/L (ref 11–82)
LV EJECT FRACT  99904: 65
LV EJECT FRACT MOD 2C 99903: 58.29
LV EJECT FRACT MOD 4C 99902: 63.95
LV EJECT FRACT MOD BP 99901: 61.35
LYMPHOCYTES # BLD AUTO: 1.98 K/UL (ref 1–4.8)
LYMPHOCYTES NFR BLD: 29.6 % (ref 22–41)
MAGNESIUM SERPL-MCNC: 2.1 MG/DL (ref 1.5–2.5)
MCH RBC QN AUTO: 28.1 PG (ref 27–33)
MCHC RBC AUTO-ENTMCNC: 34 G/DL (ref 33.6–35)
MCV RBC AUTO: 82.7 FL (ref 81.4–97.8)
MONOCYTES # BLD AUTO: 0.48 K/UL (ref 0–0.85)
MONOCYTES NFR BLD AUTO: 7.2 % (ref 0–13.4)
NEUTROPHILS # BLD AUTO: 3.95 K/UL (ref 2–7.15)
NEUTROPHILS NFR BLD: 59.2 % (ref 44–72)
NRBC # BLD AUTO: 0 K/UL
NRBC BLD-RTO: 0 /100 WBC
PLATELET # BLD AUTO: 274 K/UL (ref 164–446)
PMV BLD AUTO: 9 FL (ref 9–12.9)
POTASSIUM SERPL-SCNC: 3.7 MMOL/L (ref 3.6–5.5)
PROT SERPL-MCNC: 7.6 G/DL (ref 6–8.2)
PROTHROMBIN TIME: 13.3 SEC (ref 12–14.6)
RBC # BLD AUTO: 4.98 M/UL (ref 4.2–5.4)
SODIUM SERPL-SCNC: 139 MMOL/L (ref 135–145)
TROPONIN I SERPL-MCNC: <0.01 NG/ML (ref 0–0.04)
TSH SERPL DL<=0.005 MIU/L-ACNC: 1.72 UIU/ML (ref 0.38–5.33)
WBC # BLD AUTO: 6.7 K/UL (ref 4.8–10.8)

## 2018-11-12 PROCEDURE — 306588 SLEEVE,VASO CALF MED: Performed by: INTERNAL MEDICINE

## 2018-11-12 PROCEDURE — 71045 X-RAY EXAM CHEST 1 VIEW: CPT

## 2018-11-12 PROCEDURE — 83735 ASSAY OF MAGNESIUM: CPT

## 2018-11-12 PROCEDURE — 99220 PR INITIAL OBSERVATION CARE,LEVL III: CPT | Performed by: INTERNAL MEDICINE

## 2018-11-12 PROCEDURE — G0378 HOSPITAL OBSERVATION PER HR: HCPCS

## 2018-11-12 PROCEDURE — 93306 TTE W/DOPPLER COMPLETE: CPT

## 2018-11-12 PROCEDURE — 85730 THROMBOPLASTIN TIME PARTIAL: CPT

## 2018-11-12 PROCEDURE — 700102 HCHG RX REV CODE 250 W/ 637 OVERRIDE(OP): Performed by: INTERNAL MEDICINE

## 2018-11-12 PROCEDURE — 85610 PROTHROMBIN TIME: CPT

## 2018-11-12 PROCEDURE — 36415 COLL VENOUS BLD VENIPUNCTURE: CPT

## 2018-11-12 PROCEDURE — 93010 ELECTROCARDIOGRAM REPORT: CPT | Performed by: INTERNAL MEDICINE

## 2018-11-12 PROCEDURE — 83690 ASSAY OF LIPASE: CPT

## 2018-11-12 PROCEDURE — 85025 COMPLETE CBC W/AUTO DIFF WBC: CPT

## 2018-11-12 PROCEDURE — 93005 ELECTROCARDIOGRAM TRACING: CPT

## 2018-11-12 PROCEDURE — 84443 ASSAY THYROID STIM HORMONE: CPT

## 2018-11-12 PROCEDURE — A9270 NON-COVERED ITEM OR SERVICE: HCPCS | Performed by: INTERNAL MEDICINE

## 2018-11-12 PROCEDURE — 83036 HEMOGLOBIN GLYCOSYLATED A1C: CPT

## 2018-11-12 PROCEDURE — 80074 ACUTE HEPATITIS PANEL: CPT

## 2018-11-12 PROCEDURE — 99213 OFFICE O/P EST LOW 20 MIN: CPT | Performed by: NURSE PRACTITIONER

## 2018-11-12 PROCEDURE — 82533 TOTAL CORTISOL: CPT

## 2018-11-12 PROCEDURE — 82550 ASSAY OF CK (CPK): CPT

## 2018-11-12 PROCEDURE — 80053 COMPREHEN METABOLIC PANEL: CPT

## 2018-11-12 PROCEDURE — 80307 DRUG TEST PRSMV CHEM ANLYZR: CPT

## 2018-11-12 PROCEDURE — 83880 ASSAY OF NATRIURETIC PEPTIDE: CPT

## 2018-11-12 PROCEDURE — 82728 ASSAY OF FERRITIN: CPT

## 2018-11-12 PROCEDURE — 99285 EMERGENCY DEPT VISIT HI MDM: CPT

## 2018-11-12 PROCEDURE — 84484 ASSAY OF TROPONIN QUANT: CPT

## 2018-11-12 PROCEDURE — 93005 ELECTROCARDIOGRAM TRACING: CPT | Performed by: INTERNAL MEDICINE

## 2018-11-12 PROCEDURE — 93005 ELECTROCARDIOGRAM TRACING: CPT | Performed by: EMERGENCY MEDICINE

## 2018-11-12 PROCEDURE — 93306 TTE W/DOPPLER COMPLETE: CPT | Mod: 26 | Performed by: INTERNAL MEDICINE

## 2018-11-12 PROCEDURE — 85379 FIBRIN DEGRADATION QUANT: CPT

## 2018-11-12 RX ORDER — FENOPROFEN CALCIUM 200 MG/1
1 CAPSULE ORAL 4 TIMES DAILY
COMMUNITY
Start: 2018-08-22 | End: 2018-11-12

## 2018-11-12 RX ORDER — ATORVASTATIN CALCIUM 80 MG/1
80 TABLET, FILM COATED ORAL
Status: DISCONTINUED | OUTPATIENT
Start: 2018-11-12 | End: 2018-11-13 | Stop reason: HOSPADM

## 2018-11-12 RX ORDER — ASPIRIN 81 MG/1
324 TABLET, CHEWABLE ORAL DAILY
Status: DISCONTINUED | OUTPATIENT
Start: 2018-11-12 | End: 2018-11-13

## 2018-11-12 RX ORDER — ASPIRIN 300 MG/1
300 SUPPOSITORY RECTAL DAILY
Status: DISCONTINUED | OUTPATIENT
Start: 2018-11-12 | End: 2018-11-13

## 2018-11-12 RX ORDER — POLYETHYLENE GLYCOL 3350 17 G/17G
1 POWDER, FOR SOLUTION ORAL
Status: DISCONTINUED | OUTPATIENT
Start: 2018-11-12 | End: 2018-11-13 | Stop reason: HOSPADM

## 2018-11-12 RX ORDER — ALUMINA, MAGNESIA, AND SIMETHICONE 2400; 2400; 240 MG/30ML; MG/30ML; MG/30ML
30 SUSPENSION ORAL EVERY 4 HOURS PRN
Status: DISCONTINUED | OUTPATIENT
Start: 2018-11-12 | End: 2018-11-13 | Stop reason: HOSPADM

## 2018-11-12 RX ORDER — OMEPRAZOLE 20 MG/1
20 CAPSULE, DELAYED RELEASE ORAL DAILY
Status: DISCONTINUED | OUTPATIENT
Start: 2018-11-13 | End: 2018-11-13 | Stop reason: HOSPADM

## 2018-11-12 RX ORDER — CITALOPRAM 20 MG/1
20 TABLET ORAL DAILY
Status: DISCONTINUED | OUTPATIENT
Start: 2018-11-13 | End: 2018-11-13 | Stop reason: HOSPADM

## 2018-11-12 RX ORDER — PROMETHAZINE HYDROCHLORIDE 25 MG/1
12.5-25 SUPPOSITORY RECTAL EVERY 4 HOURS PRN
Status: DISCONTINUED | OUTPATIENT
Start: 2018-11-12 | End: 2018-11-13 | Stop reason: HOSPADM

## 2018-11-12 RX ORDER — ASPIRIN 325 MG
325 TABLET ORAL DAILY
Status: DISCONTINUED | OUTPATIENT
Start: 2018-11-12 | End: 2018-11-13

## 2018-11-12 RX ORDER — PROMETHAZINE HYDROCHLORIDE 25 MG/1
12.5-25 TABLET ORAL EVERY 4 HOURS PRN
Status: DISCONTINUED | OUTPATIENT
Start: 2018-11-12 | End: 2018-11-13 | Stop reason: HOSPADM

## 2018-11-12 RX ORDER — AMOXICILLIN 250 MG
2 CAPSULE ORAL 2 TIMES DAILY
Status: DISCONTINUED | OUTPATIENT
Start: 2018-11-12 | End: 2018-11-13 | Stop reason: HOSPADM

## 2018-11-12 RX ORDER — BENAZEPRIL HYDROCHLORIDE 20 MG/1
20 TABLET ORAL DAILY
Status: DISCONTINUED | OUTPATIENT
Start: 2018-11-13 | End: 2018-11-13 | Stop reason: HOSPADM

## 2018-11-12 RX ORDER — ONDANSETRON 2 MG/ML
4 INJECTION INTRAMUSCULAR; INTRAVENOUS EVERY 4 HOURS PRN
Status: DISCONTINUED | OUTPATIENT
Start: 2018-11-12 | End: 2018-11-13 | Stop reason: HOSPADM

## 2018-11-12 RX ORDER — LABETALOL HYDROCHLORIDE 5 MG/ML
10 INJECTION, SOLUTION INTRAVENOUS EVERY 4 HOURS PRN
Status: DISCONTINUED | OUTPATIENT
Start: 2018-11-12 | End: 2018-11-13 | Stop reason: HOSPADM

## 2018-11-12 RX ORDER — NITROGLYCERIN 0.4 MG/1
0.4 TABLET SUBLINGUAL
Status: DISCONTINUED | OUTPATIENT
Start: 2018-11-12 | End: 2018-11-13 | Stop reason: HOSPADM

## 2018-11-12 RX ORDER — LEVOTHYROXINE SODIUM 0.05 MG/1
50 TABLET ORAL
Status: DISCONTINUED | OUTPATIENT
Start: 2018-11-13 | End: 2018-11-13 | Stop reason: HOSPADM

## 2018-11-12 RX ORDER — BISACODYL 10 MG
10 SUPPOSITORY, RECTAL RECTAL
Status: DISCONTINUED | OUTPATIENT
Start: 2018-11-12 | End: 2018-11-13 | Stop reason: HOSPADM

## 2018-11-12 RX ORDER — ONDANSETRON 4 MG/1
4 TABLET, ORALLY DISINTEGRATING ORAL EVERY 4 HOURS PRN
Status: DISCONTINUED | OUTPATIENT
Start: 2018-11-12 | End: 2018-11-13 | Stop reason: HOSPADM

## 2018-11-12 RX ORDER — CLONIDINE HYDROCHLORIDE 0.1 MG/1
0.1 TABLET ORAL EVERY 6 HOURS PRN
Status: DISCONTINUED | OUTPATIENT
Start: 2018-11-12 | End: 2018-11-13 | Stop reason: HOSPADM

## 2018-11-12 RX ORDER — MORPHINE SULFATE 4 MG/ML
1-2 INJECTION, SOLUTION INTRAMUSCULAR; INTRAVENOUS
Status: DISCONTINUED | OUTPATIENT
Start: 2018-11-12 | End: 2018-11-13 | Stop reason: HOSPADM

## 2018-11-12 RX ORDER — ACETAMINOPHEN 325 MG/1
650 TABLET ORAL EVERY 6 HOURS PRN
Status: DISCONTINUED | OUTPATIENT
Start: 2018-11-12 | End: 2018-11-13 | Stop reason: HOSPADM

## 2018-11-12 RX ORDER — ENALAPRILAT 1.25 MG/ML
1.25 INJECTION INTRAVENOUS EVERY 6 HOURS PRN
Status: DISCONTINUED | OUTPATIENT
Start: 2018-11-12 | End: 2018-11-13 | Stop reason: HOSPADM

## 2018-11-12 RX ORDER — HYDROCHLOROTHIAZIDE 25 MG/1
25 TABLET ORAL DAILY
Status: DISCONTINUED | OUTPATIENT
Start: 2018-11-13 | End: 2018-11-13 | Stop reason: HOSPADM

## 2018-11-12 RX ADMIN — ASPIRIN 325 MG: 325 TABLET, COATED ORAL at 15:10

## 2018-11-12 RX ADMIN — ATORVASTATIN CALCIUM 80 MG: 80 TABLET, FILM COATED ORAL at 21:07

## 2018-11-12 RX ADMIN — ALUMINUM HYDROXIDE, MAGNESIUM HYDROXIDE,SIMETHICONE 30 ML: 400; 400; 40 LIQUID ORAL at 15:03

## 2018-11-12 ASSESSMENT — PATIENT HEALTH QUESTIONNAIRE - PHQ9
SUM OF ALL RESPONSES TO PHQ9 QUESTIONS 1 AND 2: 0
1. LITTLE INTEREST OR PLEASURE IN DOING THINGS: NOT AT ALL
2. FEELING DOWN, DEPRESSED, IRRITABLE, OR HOPELESS: NOT AT ALL
SUM OF ALL RESPONSES TO PHQ9 QUESTIONS 1 AND 2: 0
1. LITTLE INTEREST OR PLEASURE IN DOING THINGS: NOT AT ALL
2. FEELING DOWN, DEPRESSED, IRRITABLE, OR HOPELESS: NOT AT ALL
2. FEELING DOWN, DEPRESSED, IRRITABLE, OR HOPELESS: NOT AT ALL
SUM OF ALL RESPONSES TO PHQ9 QUESTIONS 1 AND 2: 0

## 2018-11-12 ASSESSMENT — COPD QUESTIONNAIRES
DO YOU EVER COUGH UP ANY MUCUS OR PHLEGM?: NO/ONLY WITH OCCASIONAL COLDS OR INFECTIONS
COPD SCREENING SCORE: 3
DO YOU EVER COUGH UP ANY MUCUS OR PHLEGM?: NO/ONLY WITH OCCASIONAL COLDS OR INFECTIONS
DURING THE PAST 4 WEEKS HOW MUCH DID YOU FEEL SHORT OF BREATH: NONE/LITTLE OF THE TIME
HAVE YOU SMOKED AT LEAST 100 CIGARETTES IN YOUR ENTIRE LIFE: YES
COPD SCREENING SCORE: 3
HAVE YOU SMOKED AT LEAST 100 CIGARETTES IN YOUR ENTIRE LIFE: YES
IN THE PAST 12 MONTHS DO YOU DO LESS THAN YOU USED TO BECAUSE OF YOUR BREATHING PROBLEMS: DISAGREE/UNSURE
DURING THE PAST 4 WEEKS HOW MUCH DID YOU FEEL SHORT OF BREATH: NONE/LITTLE OF THE TIME

## 2018-11-12 ASSESSMENT — PAIN SCALES - GENERAL
PAINLEVEL_OUTOF10: 0
PAINLEVEL_OUTOF10: 0

## 2018-11-12 ASSESSMENT — LIFESTYLE VARIABLES
DO YOU DRINK ALCOHOL: NO
EVER_SMOKED: YES
EVER_SMOKED: YES
ALCOHOL_USE: NO

## 2018-11-12 NOTE — ED PROVIDER NOTES
ED Provider Note    Scribed for Dwayne Little D.O. by Remberto Grant. 11/12/2018  1:00 PM    Primary care provider: FREDIS Rose  Means of arrival: Self  History obtained from: Patient  History limited by: None    CHIEF COMPLAINT  Chief Complaint   Patient presents with   • Sent by MD   • Chest Pain     HPI  Zainab Guy is a 58 y.o. female who presents with a history of hypertension and hypercholesterolemia to the Emergency Department complaining of chest pain with radiating pain into the neck 1 week ago.  She was evaluated by her physician today and advised to present to the ED and she had another episode of chest pain on her way to the ED.  This pain has been present intermittently over the past couple of days with the most recent occurrence this morning for a short period.  The pressure feels mild and is no longer radiating to the neck.  The pain is exacerbated by exertion.  Patient denies any leg pain, fever, abdominal pain or cough.  The patient has a 25 pack year history of smoking but quit in 2004, she does not drink alcohol anymore.    Cardiac Risk Factors:  No Age > 65  No Aspirin use within 7 days  No prior history of coronary artery disease  No diabetes  Yes hyperlipidemia   Yes hypertension  No obesity  No family history of coronary artery disease at a young age <56 yo  Former tobacco use   No drugs (methamphetamine or cocaine)  No history of aortic aneurysm   No history of aortic dissection   No history of deep vein thrombosis or pulmonary embolism   No hormone replacement  No oral birth control    REVIEW OF SYSTEMS  Pertinent positives include chest pain. Pertinent negatives include no leg pain, fever, abdominal pain, or cough.  All other systems reviewed and negative.    PAST MEDICAL HISTORY  Past Medical History:   Diagnosis Date   • GERD (gastroesophageal reflux disease)    • HTN (hypertension)    • Hypothyroidism    • Obesity    • Osteoarthritis        SURGICAL  HISTORY  Past Surgical History:   Procedure Laterality Date   • ABDOMINAL HYSTERECTOMY TOTAL     • KNEE ORIF Bilateral         SOCIAL HISTORY  Social History   Substance Use Topics   • Smoking status: Former Smoker     Packs/day: 1.50     Years: 25.00     Types: Cigarettes     Quit date: 10/2004   • Smokeless tobacco: Never Used   • Alcohol use No      History   Drug Use No       FAMILY HISTORY  Family History   Problem Relation Age of Onset   • Diabetes Mother    • Hypertension Mother    • Hypertension Father        CURRENT MEDICATIONS  Home Medications     Reviewed by Jina Kaye R.N. (Registered Nurse) on 11/12/18 at 1419  Med List Status: Complete   Medication Last Dose Status   benazepril (LOTENSIN) 20 MG Tab 11/12/2018 Active   citalopram (CELEXA) 20 MG Tab 11/12/2018 Active   hydrochlorothiazide (HYDRODIURIL) 25 MG Tab 11/12/2018 Active   levothyroxine (SYNTHROID) 50 MCG Tab 11/12/2018 Active   omeprazole (PRILOSEC) 20 MG delayed-release capsule 11/12/2018 Active                ALLERGIES  No Known Allergies    PHYSICAL EXAM  VITAL SIGNS: /94   Pulse 78   Temp 36.3 °C (97.3 °F)   Resp 16   Wt 91.5 kg (201 lb 11.5 oz)   SpO2 96%   BMI 34.63 kg/m²     Nursing notes and vitals reviewed.  Constitutional: Well developed, Well nourished, No acute distress, Non-toxic appearance.   Eyes: PERRLA, EOMI, Conjunctiva normal, No discharge.   Cardiovascular: Normal heart rate, Normal rhythm, No rubs, No gallops. Grade 2/6 holosystolic murmur  Thorax & Lungs: No respiratory distress, No rales, No rhonchi, No wheezing, No chest tenderness.   Abdomen: Bowel sounds normal, Soft, No tenderness, No guarding, No rebound, No masses, No pulsatile masses.   Skin: Warm, Dry, No erythema, No rash.   Musculoskeletal: Intact distal pulses, No edema, No cyanosis, No clubbing. Good range of motion in all major joints. No tenderness to palpation or major deformities noted, no CVA tenderness, no midline back tenderness.    Neurologic: Alert & oriented x 3, Normal motor function, Normal sensory function, No focal deficits noted.  Psychiatric: Affect normal for clinical presentation.    DIAGNOSTIC STUDIES/PROCEDURES    LABS  Results for orders placed or performed during the hospital encounter of 11/12/18   Troponin   Result Value Ref Range    Troponin I <0.01 0.00 - 0.04 ng/mL   Btype Natriuretic Peptide   Result Value Ref Range    B Natriuretic Peptide 24 0 - 100 pg/mL   CBC with Differential   Result Value Ref Range    WBC 6.7 4.8 - 10.8 K/uL    RBC 4.98 4.20 - 5.40 M/uL    Hemoglobin 14.0 12.0 - 16.0 g/dL    Hematocrit 41.2 37.0 - 47.0 %    MCV 82.7 81.4 - 97.8 fL    MCH 28.1 27.0 - 33.0 pg    MCHC 34.0 33.6 - 35.0 g/dL    RDW 41.4 35.9 - 50.0 fL    Platelet Count 274 164 - 446 K/uL    MPV 9.0 9.0 - 12.9 fL    Neutrophils-Polys 59.20 44.00 - 72.00 %    Lymphocytes 29.60 22.00 - 41.00 %    Monocytes 7.20 0.00 - 13.40 %    Eosinophils 3.10 0.00 - 6.90 %    Basophils 0.60 0.00 - 1.80 %    Immature Granulocytes 0.30 0.00 - 0.90 %    Nucleated RBC 0.00 /100 WBC    Neutrophils (Absolute) 3.95 2.00 - 7.15 K/uL    Lymphs (Absolute) 1.98 1.00 - 4.80 K/uL    Monos (Absolute) 0.48 0.00 - 0.85 K/uL    Eos (Absolute) 0.21 0.00 - 0.51 K/uL    Baso (Absolute) 0.04 0.00 - 0.12 K/uL    Immature Granulocytes (abs) 0.02 0.00 - 0.11 K/uL    NRBC (Absolute) 0.00 K/uL   Complete Metabolic Panel (CMP)   Result Value Ref Range    Sodium 139 135 - 145 mmol/L    Potassium 3.7 3.6 - 5.5 mmol/L    Chloride 104 96 - 112 mmol/L    Co2 24 20 - 33 mmol/L    Anion Gap 11.0 0.0 - 11.9    Glucose 95 65 - 99 mg/dL    Bun 14 8 - 22 mg/dL    Creatinine 0.72 0.50 - 1.40 mg/dL    Calcium 10.0 8.5 - 10.5 mg/dL    AST(SGOT) 47 (H) 12 - 45 U/L    ALT(SGPT) 77 (H) 2 - 50 U/L    Alkaline Phosphatase 87 30 - 99 U/L    Total Bilirubin 0.5 0.1 - 1.5 mg/dL    Albumin 4.4 3.2 - 4.9 g/dL    Total Protein 7.6 6.0 - 8.2 g/dL    Globulin 3.2 1.9 - 3.5 g/dL    A-G Ratio 1.4 g/dL    Prothrombin Time   Result Value Ref Range    PT 13.3 12.0 - 14.6 sec    INR 1.00 0.87 - 1.13   APTT   Result Value Ref Range    APTT 27.6 24.7 - 36.0 sec   Lipase   Result Value Ref Range    Lipase 43 11 - 82 U/L   ESTIMATED GFR   Result Value Ref Range    GFR If African American >60 >60 mL/min/1.73 m 2    GFR If Non African American >60 >60 mL/min/1.73 m 2   Magnesium   Result Value Ref Range    Magnesium 2.1 1.5 - 2.5 mg/dL   Troponin in four (4) hours   Result Value Ref Range    Troponin I <0.01 0.00 - 0.04 ng/mL   D-Dimer   Result Value Ref Range    D-Dimer Screen 0.43 0.00 - 0.50 ug/mL (FEU)   TSH WITH REFLEX TO FT4   Result Value Ref Range    TSH 1.720 0.380 - 5.330 uIU/mL   CORTISOL   Result Value Ref Range    Cortisol 3.2 0.0 - 23.0 ug/dL   CREATINE KINASE   Result Value Ref Range    CPK Total 123 0 - 154 U/L   FERRITIN   Result Value Ref Range    Ferritin 44.4 10.0 - 291.0 ng/mL   HEPATITIS PANEL ACUTE(4 COMPONENTS)   Result Value Ref Range    Hepatitis B Surface Antigen Negative Negative    Hepatitis C Antibody Negative Negative    Hepatitis B Cors Ab,IgM Negative Negative    Hepatitis A Virus Ab, IgM Negative Negative   EKG   Result Value Ref Range    Report       [facility] Emergency Dept.    Test Date:  2018  Pt Name:    MARANDA CESPEDES             Department: ER  MRN:        [id number]                      Room:        11  Gender:     Female                       Technician: [id number]  :        1960                   Requested By:ER TRIAGE PROTOCOL  Order #:    [id number]                    Rina MD: MAURICE CASTRO, DO    Measurements  Intervals                                Axis  Rate:       74                           P:          59  LA:         176                          QRS:        4  QRSD:       92                           T:          66  QT:         420  QTc:        466    Interpretive Statements  SINUS RHYTHM  No previous ECG available for  comparison    Electronically Signed On 2018 12:58:55 PST by MAURICE CASTRO DO     EKG in four (4) hours   Result Value Ref Range    Report       [facility]    Test Date:  2018  Pt Name:    MARANDA CESPEDES             Department: ER  MRN:        [id number]                      Room:       T201  Gender:     Female                       Technician: [id number]  :        1960                   Requested By:JOSE JOHNATHAN MIROSLAVA  Order #:    [id number]                    Reading MD: Markos Nick MD    Measurements  Intervals                                Axis  Rate:       74                           P:          53  SC:         188                          QRS:        17  QRSD:       94                           T:          78  QT:         416  QTc:        462    Interpretive Statements  SINUS RHYTHM  Compared to ECG 2018 10:19:23  No significant changes    Electronically Signed On 2018 19:43:50 PST by Markos Nick MD        All labs reviewed by me.    RADIOLOGY  DX-CHEST-LIMITED (1 VIEW)   Final Result      No acute cardiopulmonary disease.      EC-ECHOCARDIOGRAM COMPLETE W/ CONT    (Results Pending)   NM-CARDIAC STRESS TEST    (Results Pending)   US-RUQ    (Results Pending)     The radiologist's interpretation of all radiological studies have been reviewed by me.    COURSE & MEDICAL DECISION MAKING  Pertinent Labs & Imaging studies reviewed. (See chart for details)    1:00 PM - Patient seen and examined at bedside.  Prior records were obtained and reviewed. Ordered Chest Xray, Estimated GFR, troponin, BNP, CBC with differential, CMP, Prothrombin time, APTT, Lipase, and EKG to evaluate her symptoms.  We have discussed inpatient observation and cardiology workup.  She agrees with this plan of care.    This is a charming 58 y.o. female that presents with exertional chest pain.  The patient has a murmur that is new for her, chest discomfort.  She is not hypoxic, tachypneic or  tachycardic and do not believe she is experiencing a pulmonary embolism.  In addition, she is no evidence of ST elevation myocardial infarction negative troponin.  The patient has been admitted to the CDU for further evaluation and management of her chest discomfort.    1:08 PM I discussed the patient's case and the above findings with Dr. Lovett (hospitalist) who agrees to see and admit the patient.     DISPOSITION:  Patient will be admitted to Dr. Lovett in guarded condition.    FINAL IMPRESSION  Chest pain     IRemberto (Scribe), am scribing for, and in the presence of, Dwayne Little D.O    Electronically signed by: Remberto Grant (Scribe), 11/12/2018    IDwayne D.O. personally performed the services described in this documentation, as scribed by Remberto Grant in my presence, and it is both accurate and complete.  C.    The note accurately reflects work and decisions made by me.  Dwayne Little  11/12/2018  8:11 PM

## 2018-11-12 NOTE — ED TRIAGE NOTES
EKG completed in triage.     Pt was seen by PCP and mentioned CP to PCP and was sent here. Pt states that a week and a half ago she had an episode of tightness in chest that lasted for 10 minutes and radiates to her neck/jaw. Since then pt has had intermittent tightness. Pt denies cardiac history. No sx at this time. Pt denies SOB, n/v when CP happens.     Chief Complaint   Patient presents with   • Sent by MD   • Chest Pain     Pt placed in lobby, updated on triage process. Pt educated to notified RN or triage tech if changes in condition.

## 2018-11-12 NOTE — PROGRESS NOTES
Subjective:      Zainab Guy is a 58 y.o. female who presents with Other (chest tightnes)        CC: Patient here today for complaints of chest pain.    HPI Zainab Guy states her symptoms started about 1-1/2 weeks ago while she was at work.  She describes it as a chest pressure that lasted about 10 minutes.  She decided not to go to the emergency room and it went away on its own.  Since then, about every other day she is developing chest pressure and sometimes the pain goes into her neck.  It has not been as severe as it was the initial episode.  She has had no syncope, cough, dizziness, nausea or vomiting.    I have not seen patient since her initial visit in December of last year.  At that time I ordered lab work which came back showing elevated AST, ALT and alkaline phosphatase and she was sent to my chart message to follow-up on this but only now is coming in.  She also had a lipid panel done which showed high triglycerides at 731 with high total cholesterol and high HDL but again, she is only now coming in for discussion of results.  She did see the results on my chart.  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  She has not been having abdominal pain.        Current Outpatient Prescriptions   Medication Sig Dispense Refill   • omeprazole (PRILOSEC) 20 MG delayed-release capsule Take 1 Cap by mouth every day. 30 Cap    • levothyroxine (SYNTHROID) 50 MCG Tab Take 1 Tab by mouth Every morning on an empty stomach. 30 Tab 11   • benazepril (LOTENSIN) 20 MG Tab Take 1 Tab by mouth every day. 30 Tab 11   • hydrochlorothiazide (HYDRODIURIL) 25 MG Tab Take 1 Tab by mouth every day. 30 Tab 11   • citalopram (CELEXA) 20 MG Tab Take 1 Tab by mouth every day. 30 Tab 11   • diclofenac EC (VOLTAREN) 75 MG Tablet Delayed Response Take 75 mg by mouth 2 times a day.     • tramadol (ULTRAM) 50 MG Tab Take 1 Tab by mouth every four hours as needed. 10 Tab 0     No current  "facility-administered medications for this visit.      Social History   Substance Use Topics   • Smoking status: Former Smoker     Packs/day: 1.50     Years: 25.00     Types: Cigarettes     Quit date: 10/2004   • Smokeless tobacco: Never Used   • Alcohol use No     Past Medical History:   Diagnosis Date   • GERD (gastroesophageal reflux disease)    • HTN (hypertension)    • Hypothyroidism    • Obesity    • Osteoarthritis      Family History   Problem Relation Age of Onset   • Diabetes Mother    • Hypertension Mother    • Hypertension Father        Review of Systems   Cardiovascular: Positive for chest pain.   All other systems reviewed and are negative.         Objective:     /76 (BP Location: Right arm, Patient Position: Sitting, BP Cuff Size: Adult)   Pulse 83   Temp 36.8 °C (98.2 °F) (Temporal)   Resp 16   Ht 1.626 m (5' 4\")   Wt 91.2 kg (201 lb)   SpO2 95%   BMI 34.50 kg/m²      Physical Exam   Constitutional: She is oriented to person, place, and time. She appears well-developed and well-nourished. No distress.   HENT:   Head: Normocephalic and atraumatic.   Right Ear: External ear normal.   Left Ear: External ear normal.   Nose: Nose normal.   Eyes: Right eye exhibits no discharge. Left eye exhibits no discharge.   Neck: Normal range of motion. Neck supple. No thyromegaly present.   Cardiovascular: Normal rate, regular rhythm and normal heart sounds.  Exam reveals no gallop and no friction rub.    No murmur heard.  Pulmonary/Chest: Effort normal and breath sounds normal. She has no wheezes. She has no rales.   Musculoskeletal: She exhibits no edema or tenderness.   Neurological: She is alert and oriented to person, place, and time. She displays normal reflexes.   Skin: Skin is warm and dry. No rash noted. She is not diaphoretic.   Psychiatric: She has a normal mood and affect. Her behavior is normal. Judgment and thought content normal.   Nursing note and vitals reviewed.            "   Assessment/Plan:     1. Chest pressure  Patient has recent episodes of chest pain and I am concerned that they may be cardiac in nature.  I explained that even if I order stress testing and refer her to cardiology, and may be a number of days before this can be done.  I recommended she go to the emergency room for further workup on this chest pain and she agrees.    2. Elevated transaminase level  Patient shows elevated transaminase levels from a year ago which she is only now coming back for evaluation of.  She will need a liver ultrasound and hepatitis screening since she is not drinking alcohol currently although she was an alcoholic at one time.    3. Essential hypertension  Patient reports she continues on her HCTZ.    4. Hypothyroidism due to acquired atrophy of thyroid  Last TSH was therapeutic on her levothyroxine but she has not done recent lab work.

## 2018-11-12 NOTE — ASSESSMENT & PLAN NOTE
-Low risk for ACS. ALEXANDER score of 1.  Reasonable to pursue further cardiac workup.   -I will obtain serial troponins, and an echocardiogram.  If troponins remain negative, would proceed with nuclear cardiac stress test in the morning.  -Start empiric aspirin for now until cardiac cause ruled out.  Given her previous hypertriglyceridemia, I will start her on Lipitor 80 mg daily Recheck lipid profil,e and hemoglobin A1c for further risk stratification.  -Start as needed sublingual nitroglycerin, and morphine for pain recurrence.   -We will do further cardiac monitoring to rule out arrhythmias.  -Check d-dimer, and if elevated rule out PE with chest CT.  For completion I will obtain a urine drug screen.  -GI cause possible, given history of GERD.  Resume home dose Prilosec, and start as needed GI cocktail, and Maalox.  Workup for gallbladder pathology given the right upper quadrant tenderness, with ultrasound.

## 2018-11-12 NOTE — ED NOTES
Med rec completed per pt.   Antibiotics within last 30 days: No  Patient allergies have been reviewed: JAVID

## 2018-11-12 NOTE — TELEPHONE ENCOUNTER
1. Caller Name: Zainab                      Call Back Number: [phone] (home)       2. Message: patient wanted you to know she is in the hospital over night     3. Patient approves office to leave a detailed voicemail/MyChart message: N\A

## 2018-11-12 NOTE — PROGRESS NOTES
Assumed care of pt. Call light in reach. Bed in lowest position. Care of plan discussed with pt with pt agreeing to care of plan. Communication board updated. All questions answered. Assessment completed.

## 2018-11-12 NOTE — ASSESSMENT & PLAN NOTE
-I will further work her up for this with TSH, CPK, cortisol, and viral hepatitis panel.  I will also obtain a liver ultrasound.

## 2018-11-12 NOTE — ASSESSMENT & PLAN NOTE
-I would resume her home dose of benazepril, and Hydrochlorothiazide.  Monitor blood pressure trend closely, with as needed IV labetalol and Vasotec for significant hypertension.

## 2018-11-12 NOTE — DISCHARGE PLANNING
Care Transition Team Assessment     Spoke with patient at bedside. Daughter Cheyenne in emergency contact # [phone], Patient has [facility], RX coverage. Has her car in the garage and will drive here self home. Anticipate no needs at present time.    Information Source  Orientation : Oriented x 4  Information Given By: Patient  Informant's Name: Zainab Guy          Elopevivian Risk  Legal Hold: No  Ambulatory or Self Mobile in Wheelchair: Yes    Interdisciplinary Discharge Planning  Does Admitting Nurse Feel This Could be a Complex Discharge?: No  Primary Care Physician: Fatuma   Lives with - Patient's Self Care Capacity: Alone and Able to Care For Self  Patient or legal guardian wants to designate a caregiver (see row info): No  Support Systems: Family Member(s)  Housing / Facility: [address]  Do You Take your Prescribed Medications Regularly: Yes  Able to Return to Previous ADL's: Yes  Mobility Issues: No  Prior Services: None  Patient Expects to be Discharged to:: self  Assistance Needed: No  Durable Medical Equipment: Not Applicable     Anticipated Discharge Information  Anticipated discharge disposition: Home  Discharge Address: [address] [place] [address]  Discharge Contact Phone Number: [phone]

## 2018-11-12 NOTE — H&P
Hospital Medicine History & Physical Note    Date of Service  11/12/2018    Primary Care Physician  FREDIS Rose    Consultants  None    Code Status  Full    Chief Complaint  Chest pain    History of Presenting Illness  58 y.o. female recovering alcoholic since 2004, hypertriglyceridemia, hypertension, and hypothyroidism, who presented 11/12/2018 with chest pain.    Patient stated that about 1 week ago, as she was standing at work during a particularly stressful day, when she had an episode of chest pain, located in the middle of her chest, and radiated to below her both breasts, and neck, which she further described as sharp, rated 10 out of 10 in severity, and lasted for about 10 minutes, with associated headache, but no shortness of breath, nausea, or sweating.  The pain subsided after she sat down, and totally went away.  During the week, she subsequently had smaller episodes of chest pain, mostly related with exertion, and not particularly related to food intake.  She denied any fevers, chills, diarrhea, abdominal pain, or dysuria.    ED course:  The patient was initially evaluated in the emergency department, was maintaining good hemodynamics, and was afebrile.  Initial blood workup was only remarkable for mildly elevated AST and ALT at 47 and 77 respectively which was chronic, with normal total bilirubin and alkaline phosphatase.  Troponin was negative, and BNP was low.  CBC and BMP were unimpressive.  Chest x-ray (my read) did not show any infiltrates or consolidation.  EKG (personally reviewed) did not show any dynamic ST-T wave changes, with normal sinus rhythm.  Patient was subsequent admitted for further cardiac workup.    Review of Systems  ROS    Pertinent positives/negatives as mentioned above.     A complete review of systems was personally done by me. All other systems were negative.       Past Medical History   has a past medical history of GERD (gastroesophageal reflux disease); HTN  (hypertension); Hypothyroidism; Obesity; and Osteoarthritis.    Surgical History   has a past surgical history that includes knee orif (Bilateral) and abdominal hysterectomy total.     Family History  family history includes Diabetes in her mother; Hypertension in her father and mother.     Social History   reports that she quit smoking about 14 years ago. Her smoking use included Cigarettes. She has a 37.50 pack-year smoking history. She has never used smokeless tobacco. She reports that she does not drink alcohol or use drugs.    Allergies  No Known Allergies    Medications  Prior to Admission Medications   Prescriptions Last Dose Informant Patient Reported? Taking?   benazepril (LOTENSIN) 20 MG Tab 11/12/2018 at 0830  No No   Sig: Take 1 Tab by mouth every day.   citalopram (CELEXA) 20 MG Tab 11/12/2018 at 0830  No No   Sig: Take 1 Tab by mouth every day.   hydrochlorothiazide (HYDRODIURIL) 25 MG Tab 11/12/2018 at 0830  No No   Sig: Take 1 Tab by mouth every day.   levothyroxine (SYNTHROID) 50 MCG Tab 11/12/2018 at 0830  No No   Sig: Take 1 Tab by mouth Every morning on an empty stomach.   omeprazole (PRILOSEC) 20 MG delayed-release capsule 11/12/2018 at 0830  Yes No   Sig: Take 1 Cap by mouth every day.      Facility-Administered Medications: None       Physical Exam  Temp:  [36.3 °C (97.3 °F)-36.6 °C (97.8 °F)] 36.3 °C (97.3 °F)  Pulse:  [76-79] 78  Resp:  [16] 16  BP: (128-134)/(92-94) 134/94    Physical Exam   Constitutional: She is oriented to person, place, and time. She appears well-developed. No distress.   Obese. Body mass index is 34.44 kg/m².   HENT:   Head: Normocephalic and atraumatic.   Mouth/Throat: Oropharynx is clear and moist. No oropharyngeal exudate.   Eyes: Pupils are equal, round, and reactive to light. Conjunctivae are normal. No scleral icterus.   Neck: Normal range of motion. Neck supple. No thyromegaly present.   Cardiovascular: Normal rate and regular rhythm.  Exam reveals no gallop and  no friction rub.    Murmur ( subtle systolic murmur) heard.  Pulmonary/Chest: Effort normal and breath sounds normal. No respiratory distress. She has no wheezes. She has no rales. She exhibits no tenderness.   Abdominal: Soft. Bowel sounds are normal. She exhibits no distension. There is tenderness ( RUQ). There is no rebound and no guarding.   Musculoskeletal: Normal range of motion. She exhibits no edema or tenderness.   Lymphadenopathy:     She has no cervical adenopathy.   Neurological: She is alert and oriented to person, place, and time. No cranial nerve deficit.   Skin: Skin is warm and dry. No rash noted. She is not diaphoretic. No erythema. No pallor.   Psychiatric: She has a normal mood and affect. Her behavior is normal. Judgment and thought content normal.   Nursing note and vitals reviewed.      Laboratory:  Recent Labs      11/12/18   1217   WBC  6.7   RBC  4.98   HEMOGLOBIN  14.0   HEMATOCRIT  41.2   MCV  82.7   MCH  28.1   MCHC  34.0   RDW  41.4   PLATELETCT  274   MPV  9.0     Recent Labs      11/12/18   1217   SODIUM  139   POTASSIUM  3.7   CHLORIDE  104   CO2  24   GLUCOSE  95   BUN  14   CREATININE  0.72   CALCIUM  10.0     Recent Labs      11/12/18   1217   ALTSGPT  77*   ASTSGOT  47*   ALKPHOSPHAT  87   TBILIRUBIN  0.5   LIPASE  43   GLUCOSE  95     Recent Labs      11/12/18   1217   APTT  27.6   INR  1.00     Recent Labs      11/12/18   1217   BNPBTYPENAT  24         Recent Labs      11/12/18   1217   TROPONINI  <0.01       Urinalysis:    No results found     Imaging:  DX-CHEST-LIMITED (1 VIEW)   Final Result      No acute cardiopulmonary disease.      CT-CTA CHEST PULMONARY ARTERY W/ RECONS    (Results Pending)   EC-ECHOCARDIOGRAM COMPLETE W/ CONT    (Results Pending)   NM-CARDIAC STRESS TEST    (Results Pending)   US-RUQ    (Results Pending)         Assessment/Plan:  I anticipate this patient is appropriate for observation status at this time.    * Pain in the chest- (present on admission)    Assessment & Plan    -Low risk for ACS. ALEXANDER score of 1.  Reasonable to pursue further cardiac workup.   -I will obtain serial troponins, and an echocardiogram.  If troponins remain negative, would proceed with nuclear cardiac stress test in the morning.  -Start empiric aspirin for now until cardiac cause ruled out.  Given her previous hypertriglyceridemia, I will start her on Lipitor 80 mg daily Recheck lipid profil,e and hemoglobin A1c for further risk stratification.  -Start as needed sublingual nitroglycerin, and morphine for pain recurrence.   -We will do further cardiac monitoring to rule out arrhythmias.  -Check d-dimer, and if elevated rule out PE with chest CT.  For completion I will obtain a urine drug screen.  -GI cause possible, given history of GERD.  Resume home dose Prilosec, and start as needed GI cocktail, and Maalox.  Workup for gallbladder pathology given the right upper quadrant tenderness, with ultrasound.       Newly recognized heart murmur- (present on admission)   Assessment & Plan    -I will obtain an echocardiogram to evaluate.     Transaminitis- (present on admission)   Assessment & Plan    -I will further work her up for this with TSH, CPK, cortisol, and viral hepatitis panel.  I will also obtain a liver ultrasound.     Hypothyroidism due to acquired atrophy of thyroid- (present on admission)   Assessment & Plan    -Resume home dose Synthroid.  Check TSH.     Gastroesophageal reflux disease without esophagitis- (present on admission)   Assessment & Plan    -I will resume her home dose Prilosec, and start her on as needed GI cocktail, and Maalox.     Obesity (BMI 30-39.9)- (present on admission)   Assessment & Plan    - Body mass index is 34.63 kg/m²..  - outpatient referral for outpatient weight management.     Essential hypertension- (present on admission)   Assessment & Plan    -I would resume her home dose of benazepril, and Hydrochlorothiazide.  Monitor blood pressure trend closely,  with as needed IV labetalol and Vasotec for significant hypertension.         VTE prophylaxis: SCD

## 2018-11-13 ENCOUNTER — APPOINTMENT (OUTPATIENT)
Dept: RADIOLOGY | Facility: MEDICAL CENTER | Age: 58
End: 2018-11-13
Attending: INTERNAL MEDICINE
Payer: COMMERCIAL

## 2018-11-13 ENCOUNTER — PATIENT OUTREACH (OUTPATIENT)
Dept: HEALTH INFORMATION MANAGEMENT | Facility: OTHER | Age: 58
End: 2018-11-13

## 2018-11-13 VITALS
HEIGHT: 64 IN | BODY MASS INDEX: 34.25 KG/M2 | SYSTOLIC BLOOD PRESSURE: 131 MMHG | TEMPERATURE: 97.5 F | HEART RATE: 77 BPM | WEIGHT: 200.62 LBS | RESPIRATION RATE: 16 BRPM | DIASTOLIC BLOOD PRESSURE: 81 MMHG | OXYGEN SATURATION: 92 %

## 2018-11-13 PROBLEM — E78.2 MIXED HYPERLIPIDEMIA: Status: ACTIVE | Noted: 2018-11-13

## 2018-11-13 PROBLEM — R07.9 PAIN IN THE CHEST: Status: RESOLVED | Noted: 2018-11-12 | Resolved: 2018-11-13

## 2018-11-13 LAB
AMPHET UR QL SCN: NEGATIVE
ANION GAP SERPL CALC-SCNC: 9 MMOL/L (ref 0–11.9)
BARBITURATES UR QL SCN: NEGATIVE
BASOPHILS # BLD AUTO: 0.7 % (ref 0–1.8)
BASOPHILS # BLD: 0.04 K/UL (ref 0–0.12)
BENZODIAZ UR QL SCN: NEGATIVE
BUN SERPL-MCNC: 17 MG/DL (ref 8–22)
BZE UR QL SCN: NEGATIVE
CALCIUM SERPL-MCNC: 9.3 MG/DL (ref 8.5–10.5)
CANNABINOIDS UR QL SCN: NEGATIVE
CHLORIDE SERPL-SCNC: 103 MMOL/L (ref 96–112)
CHOLEST SERPL-MCNC: 412 MG/DL (ref 100–199)
CO2 SERPL-SCNC: 26 MMOL/L (ref 20–33)
CREAT SERPL-MCNC: 0.78 MG/DL (ref 0.5–1.4)
EOSINOPHIL # BLD AUTO: 0.14 K/UL (ref 0–0.51)
EOSINOPHIL NFR BLD: 2.4 % (ref 0–6.9)
ERYTHROCYTE [DISTWIDTH] IN BLOOD BY AUTOMATED COUNT: 42.9 FL (ref 35.9–50)
EST. AVERAGE GLUCOSE BLD GHB EST-MCNC: 137 MG/DL
GLUCOSE SERPL-MCNC: 86 MG/DL (ref 65–99)
HBA1C MFR BLD: 6.4 % (ref 0–5.6)
HCT VFR BLD AUTO: 37.3 % (ref 37–47)
HDLC SERPL-MCNC: ABNORMAL MG/DL
HGB BLD-MCNC: 12.2 G/DL (ref 12–16)
IMM GRANULOCYTES # BLD AUTO: 0.03 K/UL (ref 0–0.11)
IMM GRANULOCYTES NFR BLD AUTO: 0.5 % (ref 0–0.9)
LDLC SERPL CALC-MCNC: ABNORMAL MG/DL
LYMPHOCYTES # BLD AUTO: 2.02 K/UL (ref 1–4.8)
LYMPHOCYTES NFR BLD: 35.3 % (ref 22–41)
MCH RBC QN AUTO: 27.4 PG (ref 27–33)
MCHC RBC AUTO-ENTMCNC: 32.7 G/DL (ref 33.6–35)
MCV RBC AUTO: 83.8 FL (ref 81.4–97.8)
METHADONE UR QL SCN: NEGATIVE
MONOCYTES # BLD AUTO: 0.52 K/UL (ref 0–0.85)
MONOCYTES NFR BLD AUTO: 9.1 % (ref 0–13.4)
NEUTROPHILS # BLD AUTO: 2.97 K/UL (ref 2–7.15)
NEUTROPHILS NFR BLD: 52 % (ref 44–72)
NRBC # BLD AUTO: 0 K/UL
NRBC BLD-RTO: 0 /100 WBC
OPIATES UR QL SCN: NEGATIVE
OXYCODONE UR QL SCN: NEGATIVE
PCP UR QL SCN: NEGATIVE
PLATELET # BLD AUTO: 268 K/UL (ref 164–446)
PMV BLD AUTO: 9.1 FL (ref 9–12.9)
POTASSIUM SERPL-SCNC: 3.6 MMOL/L (ref 3.6–5.5)
PROPOXYPH UR QL SCN: NEGATIVE
RBC # BLD AUTO: 4.45 M/UL (ref 4.2–5.4)
SODIUM SERPL-SCNC: 138 MMOL/L (ref 135–145)
TRIGL SERPL-MCNC: 1409 MG/DL (ref 0–149)
WBC # BLD AUTO: 5.7 K/UL (ref 4.8–10.8)

## 2018-11-13 PROCEDURE — 36415 COLL VENOUS BLD VENIPUNCTURE: CPT

## 2018-11-13 PROCEDURE — 85025 COMPLETE CBC W/AUTO DIFF WBC: CPT

## 2018-11-13 PROCEDURE — G0378 HOSPITAL OBSERVATION PER HR: HCPCS

## 2018-11-13 PROCEDURE — 76705 ECHO EXAM OF ABDOMEN: CPT

## 2018-11-13 PROCEDURE — 700102 HCHG RX REV CODE 250 W/ 637 OVERRIDE(OP): Performed by: INTERNAL MEDICINE

## 2018-11-13 PROCEDURE — 700111 HCHG RX REV CODE 636 W/ 250 OVERRIDE (IP)

## 2018-11-13 PROCEDURE — 80048 BASIC METABOLIC PNL TOTAL CA: CPT

## 2018-11-13 PROCEDURE — A9270 NON-COVERED ITEM OR SERVICE: HCPCS | Performed by: INTERNAL MEDICINE

## 2018-11-13 PROCEDURE — 80061 LIPID PANEL: CPT

## 2018-11-13 PROCEDURE — A9502 TC99M TETROFOSMIN: HCPCS

## 2018-11-13 PROCEDURE — 99217 PR OBSERVATION CARE DISCHARGE: CPT | Performed by: INTERNAL MEDICINE

## 2018-11-13 RX ORDER — ASPIRIN 81 MG/1
81 TABLET, CHEWABLE ORAL DAILY
Qty: 100 TAB | Refills: 3 | Status: SHIPPED | OUTPATIENT
Start: 2018-11-14 | End: 2023-10-01

## 2018-11-13 RX ORDER — ATORVASTATIN CALCIUM 80 MG/1
80 TABLET, FILM COATED ORAL
Qty: 30 TAB | Refills: 2 | Status: SHIPPED | OUTPATIENT
Start: 2018-11-13 | End: 2018-11-26 | Stop reason: SDUPTHER

## 2018-11-13 RX ORDER — REGADENOSON 0.08 MG/ML
INJECTION, SOLUTION INTRAVENOUS
Status: COMPLETED
Start: 2018-11-13 | End: 2018-11-13

## 2018-11-13 RX ORDER — ASPIRIN 81 MG/1
81 TABLET, CHEWABLE ORAL DAILY
Status: DISCONTINUED | OUTPATIENT
Start: 2018-11-14 | End: 2018-11-13 | Stop reason: HOSPADM

## 2018-11-13 RX ADMIN — HYDROCHLOROTHIAZIDE 25 MG: 25 TABLET ORAL at 06:00

## 2018-11-13 RX ADMIN — OMEPRAZOLE 20 MG: 20 CAPSULE, DELAYED RELEASE ORAL at 06:00

## 2018-11-13 RX ADMIN — REGADENOSON 0.4 MG: 0.08 INJECTION, SOLUTION INTRAVENOUS at 08:26

## 2018-11-13 RX ADMIN — CITALOPRAM HYDROBROMIDE 20 MG: 20 TABLET ORAL at 06:00

## 2018-11-13 RX ADMIN — LEVOTHYROXINE SODIUM 50 MCG: 50 TABLET ORAL at 06:00

## 2018-11-13 RX ADMIN — BENAZEPRIL HYDROCHLORIDE 20 MG: 20 TABLET, COATED ORAL at 06:00

## 2018-11-13 RX ADMIN — ASPIRIN 325 MG: 325 TABLET, COATED ORAL at 06:00

## 2018-11-13 ASSESSMENT — PAIN SCALES - GENERAL
PAINLEVEL_OUTOF10: 0

## 2018-11-13 ASSESSMENT — PATIENT HEALTH QUESTIONNAIRE - PHQ9
SUM OF ALL RESPONSES TO PHQ9 QUESTIONS 1 AND 2: 0
1. LITTLE INTEREST OR PLEASURE IN DOING THINGS: NOT AT ALL
2. FEELING DOWN, DEPRESSED, IRRITABLE, OR HOPELESS: NOT AT ALL

## 2018-11-13 NOTE — PROGRESS NOTES
I have personally seen and examined / evaluated the patient, discussed the patient's evaluation & management plan with RAVEN Egan and I have reviewed the note below.     I agree with the findings, history, examination and assessment / plan as listed below with changes/addendum as listed below by me in my addendum / attestation separetely.     Presenting with chest pain.   Symptoms completely resolved now, feels baseline.     Echo with mild AS, pEF, negative stress and RUQ w/o cholecystitis.     Chronic LFTs elevation, improved compared to prior. Likely SNYDER.   Body mass index is 34.44 kg/m².    Hypertriglyceridemia, significant. TG levels of 1409 on presentation  Discussed importance of management with patient  Discussed Life style changes including exercise / dietary changes and importance of weight loss  Start atorvastatin 80 mg, repeat lipid profile with PCP in one month - if persistent consider fibrates   Above discussed with patient, complication of Hyper TG discussed including pancreatitis / others     Eager to dc home  F/U PCP   PCP to monitor above and screen for DM   PCP to monitor LFTs, consider further evaluation and consider outpatient GI evaluation     Jesica Fairchild M.D.  11/13/18  11:49 AM

## 2018-11-13 NOTE — DISCHARGE SUMMARY
Discharge Summary    CHIEF COMPLAINT ON ADMISSION  Chief Complaint   Patient presents with   • Sent by MD   • Chest Pain       Reason for Admission  Chest Pain     Admission Date  11/12/2018  Discharge Date  11/13/18    CODE STATUS  Full Code    HPI & HOSPITAL COURSE  This is a 58 y.o. female with PMH alcoholism (abstinent since 2004), hypertriglyceridemia, HTN, and hypothyroid here with chest pain.  She reports that, about a week ago she was standing at work she had an episode of chest pain located in the middle of her chest radiating into both breasts and up into her neck which resolved with rest.  Throughout the week she had similar episodes and presented to the emergency department for evaluation.  She reports being diagnosed with hypertriglyceridemia about a year ago, however she has never started on medical management.  Cardiac workup was unremarkable with negative troponins and negative stress test.  Twelve-lead EKG showed no acute ST elevations or depressions and overnight telemetry revealed no events of arrhythmias, tachycardias, bradycardias or blocks.  Echocardiogram showed EF 65% with no valvular abnormalities with a sending aorta dilatation at 3.9 cm.  Since her chest pain had completely resolved, thoracic CT will be deferred to her PCP for outpatient management and surveillance.  Liver enzymes were slightly elevated (AST 47, ALT 77), but are actually improved from a year ago.  RUQ US showed echogenic liver suggesting fatty infiltration with minimal biliary dilatation and no evidence for acute cholecystitis.  Lipid profile profile showed total cholesterol 412 and triglycerides 1409 -up from 378 and 731 approximately 1 year ago.  Patient is in agreement to start daily statin therapy and was educated on including diet and exercise.  She will also start daily baby ASA therapy.  Currently she is agreeable to the plan of care and is eager for discharge this afternoon.  She is instructed to return to the  nearest emergency department or call 911 for any worsening of symptoms.    Therefore, she is discharged in good and stable condition to home with close outpatient follow-up.    FOLLOW UP ITEMS POST DISCHARGE  -FU with PCP to discuss follow-up labs and CT scans as necessary.  -Return to the nearest emergency department or call 911 for worsening symptoms    DISCHARGE DIAGNOSES  Principal Problem (Resolved):    Pain in the chest POA: Yes  Active Problems:    Transaminitis POA: Yes    Newly recognized heart murmur POA: Yes    Essential hypertension POA: Yes    Obesity (BMI 30-39.9) POA: Yes    Gastroesophageal reflux disease without esophagitis POA: Yes    Hypothyroidism due to acquired atrophy of thyroid POA: Yes    Mixed hyperlipidemia POA: Yes      FOLLOW UP  Future Appointments  Date Time Provider Department Center   11/26/2018 9:20 AM FREDIS Rose [address]     No follow-up provider specified.    MEDICATIONS ON DISCHARGE     Medication List      START taking these medications      Instructions   aspirin 81 MG Chew chewable tablet  Start taking on:  11/14/2018  Commonly known as:  ASA   Take 1 Tab by mouth every day.  Dose:  81 mg     atorvastatin 80 MG tablet  Commonly known as:  LIPITOR   Doctor's comments:  I noted that 40 mg Lipitor was on the $4 list. OK to change to 40 mg (2 tabs) daily) if the 80 mg tablets aren't on the discounted list.  Take 1 Tab by mouth every bedtime.  Dose:  80 mg        CONTINUE taking these medications      Instructions   benazepril 20 MG Tabs  Commonly known as:  LOTENSIN   Take 1 Tab by mouth every day.  Dose:  20 mg     citalopram 20 MG Tabs  Commonly known as:  CELEXA   Take 1 Tab by mouth every day.  Dose:  20 mg     hydroCHLOROthiazide 25 MG Tabs  Commonly known as:  HYDRODIURIL   Take 1 Tab by mouth every day.  Dose:  25 mg     levothyroxine 50 MCG Tabs  Commonly known as:  SYNTHROID   Take 1 Tab by mouth Every morning on an empty stomach.  Dose:  50 mcg      omeprazole 20 MG delayed-release capsule  Commonly known as:  PRILOSEC   Take 1 Cap by mouth every day.  Dose:  20 mg            Allergies  No Known Allergies    DIET  Orders Placed This Encounter   Procedures   • Diet Order Cardiac     Standing Status:   Standing     Number of Occurrences:   1     Order Specific Question:   Diet:     Answer:   Cardiac [6]     Order Specific Question:   Miscellaneous modifications:     Answer:   No Decaf, No Caffeine(for test) [11]     Comments:   Protocol 1313 Patient to have no caffeine for 12 hours prior to exam (decaf, coffee, cola, tea, chocolate)       ACTIVITY  As tolerated.  Weight bearing as tolerated    CONSULTATIONS  NA    PROCEDURES  NA    LABORATORY  Lab Results   Component Value Date    SODIUM 138 11/13/2018    POTASSIUM 3.6 11/13/2018    CHLORIDE 103 11/13/2018    CO2 26 11/13/2018    GLUCOSE 86 11/13/2018    BUN 17 11/13/2018    CREATININE 0.78 11/13/2018        Lab Results   Component Value Date    WBC 5.7 11/13/2018    HEMOGLOBIN 12.2 11/13/2018    HEMATOCRIT 37.3 11/13/2018    PLATELETCT 268 11/13/2018        CARY Gonzalez

## 2018-11-26 ENCOUNTER — HOSPITAL ENCOUNTER (OUTPATIENT)
Dept: LAB | Facility: MEDICAL CENTER | Age: 58
End: 2018-11-26
Attending: NURSE PRACTITIONER
Payer: COMMERCIAL

## 2018-11-26 ENCOUNTER — OFFICE VISIT (OUTPATIENT)
Dept: MEDICAL GROUP | Facility: MEDICAL CENTER | Age: 58
End: 2018-11-26
Payer: COMMERCIAL

## 2018-11-26 VITALS
WEIGHT: 201 LBS | HEIGHT: 64 IN | BODY MASS INDEX: 34.31 KG/M2 | DIASTOLIC BLOOD PRESSURE: 72 MMHG | OXYGEN SATURATION: 95 % | RESPIRATION RATE: 16 BRPM | HEART RATE: 77 BPM | SYSTOLIC BLOOD PRESSURE: 128 MMHG

## 2018-11-26 DIAGNOSIS — I77.810 ASCENDING AORTA DILATATION (HCC): ICD-10-CM

## 2018-11-26 DIAGNOSIS — E78.5 DYSLIPIDEMIA: ICD-10-CM

## 2018-11-26 DIAGNOSIS — F33.42 RECURRENT MAJOR DEPRESSIVE DISORDER, IN FULL REMISSION (HCC): ICD-10-CM

## 2018-11-26 DIAGNOSIS — E03.4 HYPOTHYROIDISM DUE TO ACQUIRED ATROPHY OF THYROID: ICD-10-CM

## 2018-11-26 DIAGNOSIS — R74.01 TRANSAMINITIS: ICD-10-CM

## 2018-11-26 DIAGNOSIS — Z12.39 SCREENING FOR BREAST CANCER: ICD-10-CM

## 2018-11-26 DIAGNOSIS — E66.9 OBESITY (BMI 30-39.9): ICD-10-CM

## 2018-11-26 DIAGNOSIS — I10 ESSENTIAL HYPERTENSION: ICD-10-CM

## 2018-11-26 PROBLEM — E78.2 MIXED HYPERLIPIDEMIA: Status: RESOLVED | Noted: 2018-11-13 | Resolved: 2018-11-26

## 2018-11-26 PROCEDURE — 84443 ASSAY THYROID STIM HORMONE: CPT

## 2018-11-26 PROCEDURE — 99214 OFFICE O/P EST MOD 30 MIN: CPT | Performed by: NURSE PRACTITIONER

## 2018-11-26 PROCEDURE — 36415 COLL VENOUS BLD VENIPUNCTURE: CPT

## 2018-11-26 RX ORDER — BENAZEPRIL HYDROCHLORIDE 20 MG/1
20 TABLET ORAL DAILY
Qty: 90 TAB | Refills: 3 | Status: SHIPPED | OUTPATIENT
Start: 2018-11-26 | End: 2020-03-18

## 2018-11-26 RX ORDER — ATORVASTATIN CALCIUM 80 MG/1
80 TABLET, FILM COATED ORAL
Qty: 90 TAB | Refills: 3 | Status: SHIPPED | OUTPATIENT
Start: 2018-11-26 | End: 2020-03-18

## 2018-11-26 RX ORDER — HYDROCHLOROTHIAZIDE 25 MG/1
25 TABLET ORAL DAILY
Qty: 90 TAB | Refills: 3 | Status: SHIPPED | OUTPATIENT
Start: 2018-11-26 | End: 2020-03-18

## 2018-11-26 RX ORDER — CITALOPRAM 20 MG/1
20 TABLET ORAL DAILY
Qty: 90 TAB | Refills: 3 | Status: SHIPPED | OUTPATIENT
Start: 2018-11-26 | End: 2020-03-18

## 2018-11-26 RX ORDER — LEVOTHYROXINE SODIUM 0.05 MG/1
50 TABLET ORAL
Qty: 90 TAB | Refills: 3 | Status: SHIPPED | OUTPATIENT
Start: 2018-11-26 | End: 2020-03-18

## 2018-11-26 NOTE — PROGRESS NOTES
Subjective:      Zainab Guy is a 58 y.o. female who presents with Hospital Follow-up (Chest pain)        CC: Patient here today for hospital follow-up with chest pain, transaminitis, hypothyroidism, hypertension and dyslipidemia.    HPI Zainab Guy        1. Transaminitis  I saw her recently for her yearly follow-up visit and her liver functions were elevated.  She was sent to the hospital because of complaints of chest pain with negative workup for cardiac cause.  Her liver enzymes actually improved but were still elevated with an AST of 47 and ALT of 77.  Her testing for hepatitis was negative and her liver ultrasound showed fatty liver.    2. Hypothyroidism due to acquired atrophy of thyroid  Patient overdue for TSH testing and she is on levothyroxine.    3. Essential hypertension  Patient continues on benazepril and HCTZ and blood pressure has been controlled.    4. Ascending aorta dilatation (HCC)  This was a finding at the hospital with mild dilatation at 3.9 cm.  Her stress test was negative.    5. Obesity (BMI 30-39.9)  Patient states she has had problems losing weight for years.    6. Screening for breast cancer  Patient due for screening.    7. Recurrent major depressive disorder, in full remission (HCC)  Patient would like to continue on Celexa which she feels is helpful.    8. Dyslipidemia  Triglycerides were very high in the hospital and she also has history of prediabetes.  She was started on Lipitor and advised on a low carb diet.  Current Outpatient Prescriptions   Medication Sig Dispense Refill   • levothyroxine (SYNTHROID) 50 MCG Tab Take 1 Tab by mouth Every morning on an empty stomach. 90 Tab 3   • atorvastatin (LIPITOR) 80 MG tablet Take 1 Tab by mouth every bedtime. 90 Tab 3   • benazepril (LOTENSIN) 20 MG Tab Take 1 Tab by mouth every day. 90 Tab 3   • hydroCHLOROthiazide (HYDRODIURIL) 25 MG Tab Take 1 Tab by mouth every day. 90 Tab 3   • citalopram (CELEXA) 20 MG Tab Take 1  "Tab by mouth every day. 90 Tab 3   • aspirin (ASA) 81 MG Chew Tab chewable tablet Take 1 Tab by mouth every day. 100 Tab 3   • omeprazole (PRILOSEC) 20 MG delayed-release capsule Take 1 Cap by mouth every day. 30 Cap      No current facility-administered medications for this visit.      Social History   Substance Use Topics   • Smoking status: Former Smoker     Packs/day: 1.50     Years: 25.00     Types: Cigarettes     Quit date: 10/2004   • Smokeless tobacco: Never Used   • Alcohol use No     Family History   Problem Relation Age of Onset   • Diabetes Mother    • Hypertension Mother    • Hypertension Father      Past Medical History:   Diagnosis Date   • GERD (gastroesophageal reflux disease)    • HTN (hypertension)    • Hypothyroidism    • Obesity    • Osteoarthritis        Review of Systems   All other systems reviewed and are negative.         Objective:     /72 (BP Location: Right arm, Patient Position: Sitting, BP Cuff Size: Adult)   Pulse 77   Resp 16   Ht 1.626 m (5' 4\")   Wt 91.2 kg (201 lb)   SpO2 95%   BMI 34.50 kg/m²      Physical Exam   Constitutional: She is oriented to person, place, and time. She appears well-developed and well-nourished. No distress.   HENT:   Head: Normocephalic and atraumatic.   Right Ear: External ear normal.   Left Ear: External ear normal.   Nose: Nose normal.   Eyes: Right eye exhibits no discharge. Left eye exhibits no discharge.   Neck: Normal range of motion. Neck supple. No thyromegaly present.   Cardiovascular: Normal rate, regular rhythm and normal heart sounds.  Exam reveals no gallop and no friction rub.    No murmur heard.  Pulmonary/Chest: Effort normal and breath sounds normal. She has no wheezes. She has no rales.   Musculoskeletal: She exhibits no edema or tenderness.   Neurological: She is alert and oriented to person, place, and time. She displays normal reflexes.   Skin: Skin is warm and dry. No rash noted. She is not diaphoretic.   Psychiatric: She " has a normal mood and affect. Her behavior is normal. Judgment and thought content normal.   Nursing note and vitals reviewed.              Assessment/Plan:     1. Transaminitis  AST and ALT are better than last year and she does show fatty liver.  This hopefully will improve with weight loss.    2. Hypothyroidism due to acquired atrophy of thyroid  Patient overdue for TSH testing.  - TSH; Future  - levothyroxine (SYNTHROID) 50 MCG Tab; Take 1 Tab by mouth Every morning on an empty stomach.  Dispense: 90 Tab; Refill: 3    3. Essential hypertension  Blood pressure currently well controlled and her cardiac workup at the hospital was negative and she has no further chest pain.  - benazepril (LOTENSIN) 20 MG Tab; Take 1 Tab by mouth every day.  Dispense: 90 Tab; Refill: 3  - hydroCHLOROthiazide (HYDRODIURIL) 25 MG Tab; Take 1 Tab by mouth every day.  Dispense: 90 Tab; Refill: 3    4. Ascending aorta dilatation (HCC)  I advised patient on yearly imaging for this.  She is to keep her blood pressure controlled and she is on a statin    5. Obesity (BMI 30-39.9)  Patient would be willing to see a dietitian to discuss ways to lose weight.  - REFERRAL TO NUTRITION SERVICES    6. Screening for breast cancer    - MA-SCREENING MAMMO BILAT W/TOMOSYNTHESIS W/CAD; Future    7. Recurrent major depressive disorder, in full remission (HCC)    - citalopram (CELEXA) 20 MG Tab; Take 1 Tab by mouth every day.  Dispense: 90 Tab; Refill: 3    8. Dyslipidemia  Patient will continue on her statin and a low-carb diet.  She will need follow-up lab work within the next 6 months.  - atorvastatin (LIPITOR) 80 MG tablet; Take 1 Tab by mouth every bedtime.  Dispense: 90 Tab; Refill: 3

## 2018-11-27 LAB — TSH SERPL DL<=0.005 MIU/L-ACNC: 2.56 UIU/ML (ref 0.38–5.33)

## 2018-12-17 ENCOUNTER — HOSPITAL ENCOUNTER (OUTPATIENT)
Dept: RADIOLOGY | Facility: MEDICAL CENTER | Age: 58
End: 2018-12-17
Attending: NURSE PRACTITIONER
Payer: COMMERCIAL

## 2018-12-17 DIAGNOSIS — Z12.39 SCREENING FOR BREAST CANCER: ICD-10-CM

## 2018-12-17 PROCEDURE — 77067 SCR MAMMO BI INCL CAD: CPT

## 2019-03-17 ENCOUNTER — APPOINTMENT (OUTPATIENT)
Dept: URGENT CARE | Facility: CLINIC | Age: 59
End: 2019-03-17
Payer: COMMERCIAL

## 2019-03-18 ENCOUNTER — HOSPITAL ENCOUNTER (EMERGENCY)
Facility: MEDICAL CENTER | Age: 59
End: 2019-03-18
Payer: COMMERCIAL

## 2019-03-18 ENCOUNTER — APPOINTMENT (OUTPATIENT)
Dept: RADIOLOGY | Facility: MEDICAL CENTER | Age: 59
End: 2019-03-18
Attending: EMERGENCY MEDICINE
Payer: COMMERCIAL

## 2019-03-18 VITALS
WEIGHT: 205.69 LBS | HEIGHT: 65 IN | BODY MASS INDEX: 34.27 KG/M2 | OXYGEN SATURATION: 94 % | TEMPERATURE: 97.7 F | HEART RATE: 95 BPM | RESPIRATION RATE: 18 BRPM | SYSTOLIC BLOOD PRESSURE: 154 MMHG | DIASTOLIC BLOOD PRESSURE: 103 MMHG

## 2019-03-18 PROCEDURE — 302449 STATCHG TRIAGE ONLY (STATISTIC)

## 2019-03-19 NOTE — ED TRIAGE NOTES
"Patient c/o cough and congestion for a few days and states \"Its starting to go into my lungs and I think Im getting bronchitis.\"   "

## 2019-04-01 ENCOUNTER — TELEPHONE (OUTPATIENT)
Dept: FAMILY MEDICINE CLINIC | Age: 59
End: 2019-04-01

## 2019-07-29 ENCOUNTER — OFFICE VISIT (OUTPATIENT)
Dept: MEDICAL GROUP | Facility: MEDICAL CENTER | Age: 59
End: 2019-07-29
Payer: COMMERCIAL

## 2019-07-29 VITALS
WEIGHT: 205 LBS | RESPIRATION RATE: 16 BRPM | BODY MASS INDEX: 34.16 KG/M2 | HEIGHT: 65 IN | SYSTOLIC BLOOD PRESSURE: 140 MMHG | HEART RATE: 84 BPM | OXYGEN SATURATION: 97 % | DIASTOLIC BLOOD PRESSURE: 80 MMHG | TEMPERATURE: 97.6 F

## 2019-07-29 DIAGNOSIS — E03.4 HYPOTHYROIDISM DUE TO ACQUIRED ATROPHY OF THYROID: ICD-10-CM

## 2019-07-29 DIAGNOSIS — J45.20 MILD INTERMITTENT ASTHMA WITHOUT COMPLICATION: ICD-10-CM

## 2019-07-29 DIAGNOSIS — E78.5 DYSLIPIDEMIA: ICD-10-CM

## 2019-07-29 DIAGNOSIS — B35.6 TINEA CRURIS: ICD-10-CM

## 2019-07-29 DIAGNOSIS — E66.9 OBESITY (BMI 30-39.9): ICD-10-CM

## 2019-07-29 DIAGNOSIS — R73.01 IMPAIRED FASTING GLUCOSE: ICD-10-CM

## 2019-07-29 DIAGNOSIS — N39.46 MIXED STRESS AND URGE URINARY INCONTINENCE: ICD-10-CM

## 2019-07-29 DIAGNOSIS — R74.01 TRANSAMINITIS: ICD-10-CM

## 2019-07-29 DIAGNOSIS — I10 ESSENTIAL HYPERTENSION: ICD-10-CM

## 2019-07-29 DIAGNOSIS — R35.0 URINARY FREQUENCY: ICD-10-CM

## 2019-07-29 LAB
APPEARANCE UR: CLEAR
BILIRUB UR STRIP-MCNC: NORMAL MG/DL
COLOR UR AUTO: YELLOW
GLUCOSE UR STRIP.AUTO-MCNC: NORMAL MG/DL
HBA1C MFR BLD: 5.4 % (ref 0–5.6)
INT CON NEG: NEGATIVE
INT CON POS: POSITIVE
KETONES UR STRIP.AUTO-MCNC: NORMAL MG/DL
LEUKOCYTE ESTERASE UR QL STRIP.AUTO: NORMAL
NITRITE UR QL STRIP.AUTO: NORMAL
PH UR STRIP.AUTO: 6 [PH] (ref 5–8)
PROT UR QL STRIP: NORMAL MG/DL
RBC UR QL AUTO: NORMAL
SP GR UR STRIP.AUTO: 1.02
UROBILINOGEN UR STRIP-MCNC: 1 MG/DL

## 2019-07-29 PROCEDURE — 83036 HEMOGLOBIN GLYCOSYLATED A1C: CPT | Performed by: NURSE PRACTITIONER

## 2019-07-29 PROCEDURE — 81002 URINALYSIS NONAUTO W/O SCOPE: CPT | Performed by: NURSE PRACTITIONER

## 2019-07-29 PROCEDURE — 99214 OFFICE O/P EST MOD 30 MIN: CPT | Performed by: NURSE PRACTITIONER

## 2019-07-29 RX ORDER — KETOCONAZOLE 20 MG/G
1 CREAM TOPICAL DAILY
Qty: 1 TUBE | Refills: 11 | Status: SHIPPED | OUTPATIENT
Start: 2019-07-29 | End: 2020-05-28

## 2019-07-29 RX ORDER — ALBUTEROL SULFATE 90 UG/1
2 AEROSOL, METERED RESPIRATORY (INHALATION) EVERY 6 HOURS PRN
Qty: 8.5 G | Refills: 11 | Status: SHIPPED | OUTPATIENT
Start: 2019-07-29 | End: 2020-09-28

## 2019-07-29 NOTE — PROGRESS NOTES
Subjective:      Zainab Guy is a 59 y.o. female who presents with Urinary Frequency (leaking x 1 year and a half)    CC: Patient here today for 8-month follow-up on impaired fasting glucose, transaminitis, hypothyroidism and dyslipidemia as well as complaints of urinary frequency.        HPI Zainab Guy        1. Impaired fasting glucose  Patient's blood work from November in the hospital showed hemoglobin A1c is 6.4 and she states she has not made any significant changes in her diet except for may be cutting out some fast food but her hemoglobin A1c has significantly improved to 5.4.    2. Transaminitis  Previous liver enzymes were mildly elevated at 47 and 77 in November with negative hepatitis screening liver ultrasound showing fatty liver    3. Hypothyroidism due to acquired atrophy of thyroid  Patient currently on levothyroxine last TSH in November was therapeutic on current dose.    4. Essential hypertension  Patient reports she has no problems with her HCTZ and benazepril her blood pressure controlled.    5. Dyslipidemia  Patient had very high triglycerides of 1409 and she was placed on high-dose Lipitor in the hospital but has not had follow-up lab work.    6. Urinary frequency  This is patient's main concern today with frequent urination and small amount without dysuria and has been present for over a year.    7. Mixed stress and urge urinary incontinence  Patient also complains of urinary incontinence.  It may occur with stress but it can occur throughout the day and she is having to wear a pad because of the leakage and it causes a rash.  Her hemoglobin A1c comes back normal and she is on a diuretic but she does not feel this is the cause of her frequency and incontinence.    8. Obesity (BMI 30-39.9)  Patient continues to be obese.    9. Mild intermittent asthma without complication  Patient would like to get a refill on albuterol which she states she uses only when she is  "active.    10. Tinea cruris  Patient complains of irritation in the groin area secondary to her urinary incontinence.  Social History   Substance Use Topics   • Smoking status: Former Smoker     Packs/day: 1.50     Years: 25.00     Types: Cigarettes     Quit date: 10/2004   • Smokeless tobacco: Never Used   • Alcohol use No     Current Outpatient Prescriptions   Medication Sig Dispense Refill   • albuterol 108 (90 Base) MCG/ACT Aero Soln inhalation aerosol Inhale 2 Puffs by mouth every 6 hours as needed. 8.5 g 11   • ketoconazole (NIZORAL) 2 % Cream Apply 1 Application to affected area(s) every day. 1 Tube 11   • levothyroxine (SYNTHROID) 50 MCG Tab Take 1 Tab by mouth Every morning on an empty stomach. 90 Tab 3   • atorvastatin (LIPITOR) 80 MG tablet Take 1 Tab by mouth every bedtime. 90 Tab 3   • benazepril (LOTENSIN) 20 MG Tab Take 1 Tab by mouth every day. 90 Tab 3   • hydroCHLOROthiazide (HYDRODIURIL) 25 MG Tab Take 1 Tab by mouth every day. 90 Tab 3   • citalopram (CELEXA) 20 MG Tab Take 1 Tab by mouth every day. 90 Tab 3   • aspirin (ASA) 81 MG Chew Tab chewable tablet Take 1 Tab by mouth every day. 100 Tab 3   • omeprazole (PRILOSEC) 20 MG delayed-release capsule Take 1 Cap by mouth every day. 30 Cap      No current facility-administered medications for this visit.      Family History   Problem Relation Age of Onset   • Diabetes Mother    • Hypertension Mother    • Hypertension Father      Past Medical History:   Diagnosis Date   • GERD (gastroesophageal reflux disease)    • HTN (hypertension)    • Hypothyroidism    • Obesity    • Osteoarthritis        Review of Systems   Genitourinary: Positive for frequency.   Skin: Positive for rash.   All other systems reviewed and are negative.         Objective:     /80 (BP Location: Right arm, Patient Position: Sitting, BP Cuff Size: Adult)   Pulse 84   Temp 36.4 °C (97.6 °F) (Temporal)   Resp 16   Ht 1.651 m (5' 5\")   Wt 93 kg (205 lb)   SpO2 97%   BMI " 34.11 kg/m²      Physical Exam   Constitutional: She is oriented to person, place, and time. She appears well-developed and well-nourished. No distress.   HENT:   Head: Normocephalic and atraumatic.   Right Ear: External ear normal.   Left Ear: External ear normal.   Nose: Nose normal.   Eyes: Right eye exhibits no discharge. Left eye exhibits no discharge.   Neck: Normal range of motion. Neck supple. No thyromegaly present.   Cardiovascular: Normal rate and regular rhythm.  Exam reveals no gallop and no friction rub.    Murmur heard.  Pulmonary/Chest: Effort normal and breath sounds normal. She has no wheezes. She has no rales.   Musculoskeletal: She exhibits no edema or tenderness.   Neurological: She is alert and oriented to person, place, and time. She displays normal reflexes.   Skin: Skin is warm and dry. No rash noted. She is not diaphoretic.   Psychiatric: She has a normal mood and affect. Her behavior is normal. Judgment and thought content normal.   Nursing note and vitals reviewed.              Assessment/Plan:     1. Impaired fasting glucose  Patient's hemoglobin A1c has surprisingly improved to 5.4 despite no significant change in diet.  We will continue to monitor this.  - POCT  A1C  - Comp Metabolic Panel; Future  - MICROALBUMIN CREAT RATIO URINE; Future    2. Transaminitis  I would like to recheck liver enzymes which were mildly elevated secondary to fatty liver and obesity.    3. Hypothyroidism due to acquired atrophy of thyroid  Patient due for lab work to check thyroid levels on current dose of levothyroxine  - TSH; Future    4. Essential hypertension  Blood pressure controlled on her current 2 medications.    5. Dyslipidemia  Patient needs lab work to check triglycerides and if they are still significantly elevated she may need the addition of a fenofibrate  - Lipid Profile; Future    6. Urinary frequency  Urine dip in the office shows negative glucose, negative nitrates, negative leukocytes or  blood.  - POCT Urinalysis    7. Mixed stress and urge urinary incontinence  It does not appear this is secondary to diabetes and I did talk to her about her diuretic could be contributing but she does not wish to switch off the diuretic unless necessary.  We talked about possibility of medications such as Detrol LA but she would prefer not to.  I therefore will refer her to urology for further evaluation.  - REFERRAL TO UROLOGY    8. Obesity (BMI 30-39.9)  Patient to try to lose weight.    9. Mild intermittent asthma without complication  I reviewed usage of an albuterol inhaler and she will only use it as needed.  - albuterol 108 (90 Base) MCG/ACT Aero Soln inhalation aerosol; Inhale 2 Puffs by mouth every 6 hours as needed.  Dispense: 8.5 g; Refill: 11    10. Tinea cruris  Patient will use the cream once a day for up to 4 weeks for irritation secondary to urinary incontinence and explanation of keeping the area clean and dry given.  - ketoconazole (NIZORAL) 2 % Cream; Apply 1 Application to affected area(s) every day.  Dispense: 1 Tube; Refill: 11

## 2019-07-30 ENCOUNTER — TELEPHONE (OUTPATIENT)
Dept: MEDICAL GROUP | Facility: MEDICAL CENTER | Age: 59
End: 2019-07-30

## 2019-07-30 NOTE — TELEPHONE ENCOUNTER
There is no specific test that checks to be sure if all medicines are correct but the lab work ordered such as the lipid panel will let us know regarding cholesterol medication and the thyroid test for thyroid medication for example.

## 2019-07-30 NOTE — TELEPHONE ENCOUNTER
1. Caller Name: Zainab Guy                                       Call Back Number: [phone] (home)       Pt lvm wanting to know if there is a generic testing that can be ordered to make sure if she is taking the correct medications             Patient approves a detailed voicemail message: N\A

## 2019-08-06 ENCOUNTER — HOSPITAL ENCOUNTER (OUTPATIENT)
Dept: LAB | Facility: MEDICAL CENTER | Age: 59
End: 2019-08-06
Attending: NURSE PRACTITIONER
Payer: COMMERCIAL

## 2019-08-06 DIAGNOSIS — R73.01 IMPAIRED FASTING GLUCOSE: ICD-10-CM

## 2019-08-06 DIAGNOSIS — E03.4 HYPOTHYROIDISM DUE TO ACQUIRED ATROPHY OF THYROID: ICD-10-CM

## 2019-08-06 DIAGNOSIS — E78.5 DYSLIPIDEMIA: ICD-10-CM

## 2019-08-06 LAB
ALBUMIN SERPL BCP-MCNC: 4.1 G/DL (ref 3.2–4.9)
ALBUMIN/GLOB SERPL: 1.4 G/DL
ALP SERPL-CCNC: 92 U/L (ref 30–99)
ALT SERPL-CCNC: 42 U/L (ref 2–50)
ANION GAP SERPL CALC-SCNC: 10 MMOL/L (ref 0–11.9)
AST SERPL-CCNC: 29 U/L (ref 12–45)
BILIRUB SERPL-MCNC: 0.6 MG/DL (ref 0.1–1.5)
BUN SERPL-MCNC: 16 MG/DL (ref 8–22)
CALCIUM SERPL-MCNC: 9.3 MG/DL (ref 8.5–10.5)
CHLORIDE SERPL-SCNC: 104 MMOL/L (ref 96–112)
CHOLEST SERPL-MCNC: 339 MG/DL (ref 100–199)
CO2 SERPL-SCNC: 25 MMOL/L (ref 20–33)
CREAT SERPL-MCNC: 0.68 MG/DL (ref 0.5–1.4)
CREAT UR-MCNC: 145.3 MG/DL
GLOBULIN SER CALC-MCNC: 3 G/DL (ref 1.9–3.5)
GLUCOSE SERPL-MCNC: 86 MG/DL (ref 65–99)
HDLC SERPL-MCNC: ABNORMAL MG/DL
LDLC SERPL CALC-MCNC: ABNORMAL MG/DL
MICROALBUMIN UR-MCNC: <0.7 MG/DL
MICROALBUMIN/CREAT UR: NORMAL MG/G (ref 0–30)
POTASSIUM SERPL-SCNC: 3.8 MMOL/L (ref 3.6–5.5)
PROT SERPL-MCNC: 7.1 G/DL (ref 6–8.2)
SODIUM SERPL-SCNC: 139 MMOL/L (ref 135–145)
TRIGL SERPL-MCNC: 1235 MG/DL (ref 0–149)
TSH SERPL DL<=0.005 MIU/L-ACNC: 3.11 UIU/ML (ref 0.38–5.33)

## 2019-08-06 PROCEDURE — 82570 ASSAY OF URINE CREATININE: CPT

## 2019-08-06 PROCEDURE — 84443 ASSAY THYROID STIM HORMONE: CPT

## 2019-08-06 PROCEDURE — 82043 UR ALBUMIN QUANTITATIVE: CPT

## 2019-08-06 PROCEDURE — 80053 COMPREHEN METABOLIC PANEL: CPT

## 2019-08-06 PROCEDURE — 80061 LIPID PANEL: CPT

## 2019-08-06 PROCEDURE — 36415 COLL VENOUS BLD VENIPUNCTURE: CPT

## 2019-08-07 DIAGNOSIS — E78.1 HYPERTRIGLYCERIDEMIA: ICD-10-CM

## 2019-08-27 ENCOUNTER — OFFICE VISIT (OUTPATIENT)
Dept: MEDICAL GROUP | Facility: MEDICAL CENTER | Age: 59
End: 2019-08-27
Payer: COMMERCIAL

## 2019-08-27 VITALS
DIASTOLIC BLOOD PRESSURE: 76 MMHG | SYSTOLIC BLOOD PRESSURE: 124 MMHG | BODY MASS INDEX: 34.99 KG/M2 | WEIGHT: 210 LBS | OXYGEN SATURATION: 96 % | TEMPERATURE: 97.2 F | HEART RATE: 75 BPM | RESPIRATION RATE: 16 BRPM | HEIGHT: 65 IN

## 2019-08-27 DIAGNOSIS — J40 BRONCHITIS: ICD-10-CM

## 2019-08-27 DIAGNOSIS — E03.4 HYPOTHYROIDISM DUE TO ACQUIRED ATROPHY OF THYROID: ICD-10-CM

## 2019-08-27 DIAGNOSIS — E78.1 HYPERTRIGLYCERIDEMIA: ICD-10-CM

## 2019-08-27 PROBLEM — R74.01 TRANSAMINITIS: Status: RESOLVED | Noted: 2018-11-12 | Resolved: 2019-08-27

## 2019-08-27 PROCEDURE — 99214 OFFICE O/P EST MOD 30 MIN: CPT | Performed by: NURSE PRACTITIONER

## 2019-08-27 RX ORDER — DOXYCYCLINE HYCLATE 100 MG
100 TABLET ORAL 2 TIMES DAILY
Qty: 20 TAB | Refills: 0 | Status: SHIPPED | OUTPATIENT
Start: 2019-08-27 | End: 2019-09-06

## 2019-08-27 ASSESSMENT — ENCOUNTER SYMPTOMS
SPUTUM PRODUCTION: 1
WHEEZING: 1
COUGH: 1

## 2019-08-27 NOTE — PROGRESS NOTES
Subjective:      Zainab Guy is a 59 y.o. female who presents with Sinus Problem (pressure/ pain)        CC: Patient here for new problem with cough and congestion as well as follow-up on lab results.    HPI       1. Hypothyroidism due to acquired atrophy of thyroid  Patient currently on levothyroxine 50 mcg and TSH comes back therapeutic on current dose.    2. Hypertriglyceridemia  Patient has history of dyslipidemia for which she has been taking Lipitor 40 mg and this was increased to 80 mg but despite this she continues to show high total cholesterol 339 and high triglycerides of 1235.  She has never been on fenofibrate.  She was referred to our lipid clinic 3 weeks ago but she states she has not been contacted as of yet.  She has no history of pancreatitis    3. Bronchitis  Patient's main concern today is cough.  She reports history of asthma and normally only uses albuterol for breakthrough when ill.  She states she was recently having wheezing and shortness of breath and was seen at [facility] ER where she was treated for what appears to be an asthma exacerbation with steroids and albuterol.  She believes allergies may be a trigger.  She states despite treatment she still has cough, sinus congestion and chest congestion.  Nothing seems to make her better and she is concerned because she had a bronchitis or pneumonia a few months ago for which she was treated at urgent care.  Past Medical History:   Diagnosis Date   • GERD (gastroesophageal reflux disease)    • HTN (hypertension)    • Hypothyroidism    • Obesity    • Osteoarthritis      Social History     Socioeconomic History   • Marital status: Single     Spouse name: Not on file   • Number of children: Not on file   • Years of education: Not on file   • Highest education level: Not on file   Occupational History   • Not on file   Social Needs   • Financial resource strain: Not on file   • Food insecurity:     Worry: Not on file      Inability: Not on file   • Transportation needs:     Medical: Not on file     Non-medical: Not on file   Tobacco Use   • Smoking status: Former Smoker     Packs/day: 1.50     Years: 25.00     Pack years: 37.50     Types: Cigarettes     Last attempt to quit: 10/2004     Years since quittin.9   • Smokeless tobacco: Never Used   Substance and Sexual Activity   • Alcohol use: No   • Drug use: No   • Sexual activity: Not Currently     Birth control/protection: Post-Menopausal   Lifestyle   • Physical activity:     Days per week: Not on file     Minutes per session: Not on file   • Stress: Not on file   Relationships   • Social connections:     Talks on phone: Not on file     Gets together: Not on file     Attends Zoroastrianism service: Not on file     Active member of club or organization: Not on file     Attends meetings of clubs or organizations: Not on file     Relationship status: Not on file   • Intimate partner violence:     Fear of current or ex partner: Not on file     Emotionally abused: Not on file     Physically abused: Not on file     Forced sexual activity: Not on file   Other Topics Concern   • Not on file   Social History Narrative   • Not on file     Current Outpatient Medications   Medication Sig Dispense Refill   • doxycycline (VIBRAMYCIN) 100 MG Tab Take 1 Tab by mouth 2 times a day for 10 days. 20 Tab 0   • albuterol 108 (90 Base) MCG/ACT Aero Soln inhalation aerosol Inhale 2 Puffs by mouth every 6 hours as needed. 8.5 g 11   • ketoconazole (NIZORAL) 2 % Cream Apply 1 Application to affected area(s) every day. 1 Tube 11   • levothyroxine (SYNTHROID) 50 MCG Tab Take 1 Tab by mouth Every morning on an empty stomach. 90 Tab 3   • atorvastatin (LIPITOR) 80 MG tablet Take 1 Tab by mouth every bedtime. 90 Tab 3   • benazepril (LOTENSIN) 20 MG Tab Take 1 Tab by mouth every day. 90 Tab 3   • hydroCHLOROthiazide (HYDRODIURIL) 25 MG Tab Take 1 Tab by mouth every day. 90 Tab 3   • citalopram (CELEXA) 20 MG  "Tab Take 1 Tab by mouth every day. 90 Tab 3   • aspirin (ASA) 81 MG Chew Tab chewable tablet Take 1 Tab by mouth every day. 100 Tab 3   • omeprazole (PRILOSEC) 20 MG delayed-release capsule Take 1 Cap by mouth every day. 30 Cap      No current facility-administered medications for this visit.      Family History   Problem Relation Age of Onset   • Diabetes Mother    • Hypertension Mother    • Hypertension Father          Review of Systems   Constitutional: Positive for malaise/fatigue.   HENT: Positive for congestion.    Respiratory: Positive for cough, sputum production and wheezing.    Endo/Heme/Allergies: Positive for environmental allergies.   All other systems reviewed and are negative.         Objective:     /76 (BP Location: Right arm, Patient Position: Sitting, BP Cuff Size: Adult)   Pulse 75   Temp 36.2 °C (97.2 °F) (Temporal)   Resp 16   Ht 1.651 m (5' 5\")   Wt 95.3 kg (210 lb)   SpO2 96%   BMI 34.95 kg/m²      Physical Exam   Constitutional: She is oriented to person, place, and time. She appears well-developed and well-nourished. No distress.   HENT:   Head: Normocephalic and atraumatic.   Right Ear: External ear normal.   Left Ear: External ear normal.   Nose: Nose normal.   Eyes: Right eye exhibits no discharge. Left eye exhibits no discharge.   Neck: Normal range of motion. Neck supple. No thyromegaly present.   Cardiovascular: Normal rate, regular rhythm and normal heart sounds. Exam reveals no gallop and no friction rub.   No murmur heard.  Pulmonary/Chest: Effort normal. She has wheezes.   Musculoskeletal: She exhibits no edema or tenderness.   Neurological: She is alert and oriented to person, place, and time. She displays normal reflexes.   Skin: Skin is warm and dry. No rash noted. She is not diaphoretic.   Psychiatric: She has a normal mood and affect. Her behavior is normal. Judgment and thought content normal.   Nursing note and vitals reviewed.              Assessment/Plan: "     1. Hypothyroidism due to acquired atrophy of thyroid  TSH appears therapeutic on current dose of levothyroxine and I will keep at the same.    2. Hypertriglyceridemia  Patient is showing high triglycerides and total cholesterol despite Lipitor 80 mg which she states she is taking daily.  I explained I did refer her to our lipid clinic and she was given a copy of the referral so she can make an appointment there to see if a fenofibrate may be a good option as well.  I explained about the risk for pancreatitis with prolonged elevated triglycerides.    3. Bronchitis  I will try to get records from [facility] where she states she was seen not too long ago for appears to be a asthma exacerbation.  She does not feel she needs preventative inhalers since she only develops trouble with breathing when she gets ill.  I will put her on doxycycline to cover for possible pneumonia and have her continue with her albuterol as needed.  I advised her to start on an antihistamine and steroid nasal spray as well since her allergies seem to play a part in this.  If she does not improve I recommend a chest x-ray, possible referral to an allergist, and a preventative inhaler such as Advair.  - doxycycline (VIBRAMYCIN) 100 MG Tab; Take 1 Tab by mouth 2 times a day for 10 days.  Dispense: 20 Tab; Refill: 0

## 2019-09-09 ENCOUNTER — TELEPHONE (OUTPATIENT)
Dept: MEDICAL GROUP | Facility: MEDICAL CENTER | Age: 59
End: 2019-09-09

## 2019-09-09 DIAGNOSIS — J45.20 MILD INTERMITTENT ASTHMA WITHOUT COMPLICATION: ICD-10-CM

## 2019-09-09 RX ORDER — DOXYCYCLINE HYCLATE 100 MG
100 TABLET ORAL 2 TIMES DAILY
Qty: 14 TAB | Refills: 0 | Status: SHIPPED | OUTPATIENT
Start: 2019-09-09 | End: 2019-09-09 | Stop reason: SDUPTHER

## 2019-09-09 RX ORDER — DOXYCYCLINE HYCLATE 100 MG
100 TABLET ORAL 2 TIMES DAILY
Qty: 14 TAB | Refills: 0 | Status: SHIPPED | OUTPATIENT
Start: 2019-09-09 | End: 2019-09-16

## 2019-09-09 NOTE — TELEPHONE ENCOUNTER
A one-week prescription for doxycycline antibiotic was sent to her pharmacy but if she does not get better with this, she needs to be seen.

## 2019-09-09 NOTE — TELEPHONE ENCOUNTER
1. Caller Name: Zainab                      Call Back Number: [phone] (home)       2. Message: Patient finished her antibiotic and prednisone. She states she is not feeling better and wanted to see if she can get another antibiotic and/or another round of prednisone.    3. Patient approves office to leave a detailed voicemail/MyChart message: yes

## 2019-09-10 DIAGNOSIS — J45.20 MILD INTERMITTENT ASTHMA WITHOUT COMPLICATION: ICD-10-CM

## 2019-09-10 RX ORDER — AZITHROMYCIN 250 MG/1
TABLET, FILM COATED ORAL
Qty: 1 QUANTITY SUFFICIENT | Refills: 0 | Status: SHIPPED | OUTPATIENT
Start: 2019-09-10 | End: 2020-05-28

## 2019-09-10 NOTE — TELEPHONE ENCOUNTER
Pt called this morning she states she has tried doxycycline in the past and it doesn't work for her... She is wondering if she can get  A ZPack instead and to be sent to [facility]

## 2019-09-24 ENCOUNTER — HOSPITAL ENCOUNTER (OUTPATIENT)
Dept: LAB | Facility: MEDICAL CENTER | Age: 59
End: 2019-09-24
Attending: NURSE PRACTITIONER
Payer: COMMERCIAL

## 2019-09-24 PROCEDURE — 87086 URINE CULTURE/COLONY COUNT: CPT

## 2019-09-25 LAB
AMBIGUOUS DTTM AMBI4: NORMAL
SIGNIFICANT IND 70042: NORMAL
SITE SITE: NORMAL
SOURCE SOURCE: NORMAL

## 2019-09-27 LAB
BACTERIA UR CULT: NORMAL
SIGNIFICANT IND 70042: NORMAL
SITE SITE: NORMAL
SOURCE SOURCE: NORMAL

## 2020-01-23 ENCOUNTER — HOSPITAL ENCOUNTER (OUTPATIENT)
Dept: LAB | Facility: MEDICAL CENTER | Age: 60
End: 2020-01-23
Attending: PHYSICIAN ASSISTANT
Payer: COMMERCIAL

## 2020-01-23 PROCEDURE — 87186 SC STD MICRODIL/AGAR DIL: CPT

## 2020-01-23 PROCEDURE — 87086 URINE CULTURE/COLONY COUNT: CPT

## 2020-01-23 PROCEDURE — 87077 CULTURE AEROBIC IDENTIFY: CPT

## 2020-01-28 LAB
BACTERIA UR CULT: ABNORMAL
BACTERIA UR CULT: ABNORMAL
SIGNIFICANT IND 70042: ABNORMAL
SITE SITE: ABNORMAL
SOURCE SOURCE: ABNORMAL

## 2020-03-02 DIAGNOSIS — N64.4 BREAST PAIN: ICD-10-CM

## 2020-03-10 ENCOUNTER — APPOINTMENT (OUTPATIENT)
Dept: RADIOLOGY | Facility: MEDICAL CENTER | Age: 60
End: 2020-03-10
Attending: NURSE PRACTITIONER
Payer: COMMERCIAL

## 2020-03-18 DIAGNOSIS — I10 ESSENTIAL HYPERTENSION: ICD-10-CM

## 2020-03-18 DIAGNOSIS — F33.42 RECURRENT MAJOR DEPRESSIVE DISORDER, IN FULL REMISSION (HCC): ICD-10-CM

## 2020-03-18 DIAGNOSIS — E03.4 HYPOTHYROIDISM DUE TO ACQUIRED ATROPHY OF THYROID: ICD-10-CM

## 2020-03-18 DIAGNOSIS — E78.5 DYSLIPIDEMIA: ICD-10-CM

## 2020-03-18 RX ORDER — ATORVASTATIN CALCIUM 80 MG/1
TABLET, FILM COATED ORAL
Qty: 90 TAB | Refills: 0 | Status: SHIPPED | OUTPATIENT
Start: 2020-03-18 | End: 2020-07-06

## 2020-03-18 RX ORDER — HYDROCHLOROTHIAZIDE 25 MG/1
TABLET ORAL
Qty: 90 TAB | Refills: 0 | Status: SHIPPED | OUTPATIENT
Start: 2020-03-18 | End: 2020-07-06

## 2020-03-18 RX ORDER — CITALOPRAM 20 MG/1
TABLET ORAL
Qty: 90 TAB | Refills: 0 | Status: SHIPPED | OUTPATIENT
Start: 2020-03-18 | End: 2020-07-06

## 2020-03-18 RX ORDER — BENAZEPRIL HYDROCHLORIDE 20 MG/1
TABLET ORAL
Qty: 90 TAB | Refills: 0 | Status: SHIPPED | OUTPATIENT
Start: 2020-03-18 | End: 2020-07-06

## 2020-03-18 RX ORDER — LEVOTHYROXINE SODIUM 0.05 MG/1
TABLET ORAL
Qty: 90 TAB | Refills: 0 | Status: SHIPPED | OUTPATIENT
Start: 2020-03-18 | End: 2020-07-06

## 2020-05-28 ENCOUNTER — HOSPITAL ENCOUNTER (OUTPATIENT)
Facility: MEDICAL CENTER | Age: 60
End: 2020-05-29
Attending: EMERGENCY MEDICINE | Admitting: INTERNAL MEDICINE
Payer: COMMERCIAL

## 2020-05-28 ENCOUNTER — APPOINTMENT (OUTPATIENT)
Dept: RADIOLOGY | Facility: MEDICAL CENTER | Age: 60
End: 2020-05-28
Attending: EMERGENCY MEDICINE
Payer: COMMERCIAL

## 2020-05-28 VITALS
HEART RATE: 59 BPM | OXYGEN SATURATION: 91 % | RESPIRATION RATE: 14 BRPM | WEIGHT: 206.79 LBS | BODY MASS INDEX: 34.45 KG/M2 | DIASTOLIC BLOOD PRESSURE: 76 MMHG | HEIGHT: 65 IN | SYSTOLIC BLOOD PRESSURE: 149 MMHG | TEMPERATURE: 97.3 F

## 2020-05-28 DIAGNOSIS — R07.9 ACUTE CHEST PAIN: ICD-10-CM

## 2020-05-28 LAB
ALBUMIN SERPL BCP-MCNC: 4.7 G/DL (ref 3.2–4.9)
ALBUMIN/GLOB SERPL: 1.6 G/DL
ALP SERPL-CCNC: 99 U/L (ref 30–99)
ALT SERPL-CCNC: 34 U/L (ref 2–50)
ANION GAP SERPL CALC-SCNC: 16 MMOL/L (ref 7–16)
AST SERPL-CCNC: 28 U/L (ref 12–45)
BASOPHILS # BLD AUTO: 0.6 % (ref 0–1.8)
BASOPHILS # BLD: 0.05 K/UL (ref 0–0.12)
BILIRUB SERPL-MCNC: 0.6 MG/DL (ref 0.1–1.5)
BUN SERPL-MCNC: 19 MG/DL (ref 8–22)
CALCIUM SERPL-MCNC: 9.6 MG/DL (ref 8.5–10.5)
CHLORIDE SERPL-SCNC: 98 MMOL/L (ref 96–112)
CO2 SERPL-SCNC: 25 MMOL/L (ref 20–33)
CREAT SERPL-MCNC: 0.71 MG/DL (ref 0.5–1.4)
EKG IMPRESSION: NORMAL
EOSINOPHIL # BLD AUTO: 0.12 K/UL (ref 0–0.51)
EOSINOPHIL NFR BLD: 1.5 % (ref 0–6.9)
ERYTHROCYTE [DISTWIDTH] IN BLOOD BY AUTOMATED COUNT: 46.7 FL (ref 35.9–50)
GLOBULIN SER CALC-MCNC: 2.9 G/DL (ref 1.9–3.5)
GLUCOSE SERPL-MCNC: 87 MG/DL (ref 65–99)
HCT VFR BLD AUTO: 42.7 % (ref 37–47)
HGB BLD-MCNC: 14.2 G/DL (ref 12–16)
IMM GRANULOCYTES # BLD AUTO: 0.01 K/UL (ref 0–0.11)
IMM GRANULOCYTES NFR BLD AUTO: 0.1 % (ref 0–0.9)
LIPASE SERPL-CCNC: 48 U/L (ref 11–82)
LYMPHOCYTES # BLD AUTO: 2.4 K/UL (ref 1–4.8)
LYMPHOCYTES NFR BLD: 30.9 % (ref 22–41)
MCH RBC QN AUTO: 28.9 PG (ref 27–33)
MCHC RBC AUTO-ENTMCNC: 33.3 G/DL (ref 33.6–35)
MCV RBC AUTO: 87 FL (ref 81.4–97.8)
MONOCYTES # BLD AUTO: 0.58 K/UL (ref 0–0.85)
MONOCYTES NFR BLD AUTO: 7.5 % (ref 0–13.4)
NEUTROPHILS # BLD AUTO: 4.61 K/UL (ref 2–7.15)
NEUTROPHILS NFR BLD: 59.4 % (ref 44–72)
NRBC # BLD AUTO: 0 K/UL
NRBC BLD-RTO: 0 /100 WBC
PLATELET # BLD AUTO: 309 K/UL (ref 164–446)
PMV BLD AUTO: 9.3 FL (ref 9–12.9)
POTASSIUM SERPL-SCNC: 3.8 MMOL/L (ref 3.6–5.5)
PROT SERPL-MCNC: 7.6 G/DL (ref 6–8.2)
RBC # BLD AUTO: 4.91 M/UL (ref 4.2–5.4)
SODIUM SERPL-SCNC: 139 MMOL/L (ref 135–145)
TROPONIN T SERPL-MCNC: 8 NG/L (ref 6–19)
TSH SERPL DL<=0.005 MIU/L-ACNC: 0.66 UIU/ML (ref 0.38–5.33)
WBC # BLD AUTO: 7.8 K/UL (ref 4.8–10.8)

## 2020-05-28 PROCEDURE — 71045 X-RAY EXAM CHEST 1 VIEW: CPT

## 2020-05-28 PROCEDURE — 93005 ELECTROCARDIOGRAM TRACING: CPT

## 2020-05-28 PROCEDURE — 83690 ASSAY OF LIPASE: CPT

## 2020-05-28 PROCEDURE — 36415 COLL VENOUS BLD VENIPUNCTURE: CPT

## 2020-05-28 PROCEDURE — 80053 COMPREHEN METABOLIC PANEL: CPT

## 2020-05-28 PROCEDURE — G0378 HOSPITAL OBSERVATION PER HR: HCPCS

## 2020-05-28 PROCEDURE — 84484 ASSAY OF TROPONIN QUANT: CPT

## 2020-05-28 PROCEDURE — 85025 COMPLETE CBC W/AUTO DIFF WBC: CPT

## 2020-05-28 PROCEDURE — 83036 HEMOGLOBIN GLYCOSYLATED A1C: CPT

## 2020-05-28 PROCEDURE — 99220 PR INITIAL OBSERVATION CARE,LEVL III: CPT | Performed by: INTERNAL MEDICINE

## 2020-05-28 PROCEDURE — 99285 EMERGENCY DEPT VISIT HI MDM: CPT

## 2020-05-28 PROCEDURE — 93005 ELECTROCARDIOGRAM TRACING: CPT | Performed by: EMERGENCY MEDICINE

## 2020-05-28 PROCEDURE — 84443 ASSAY THYROID STIM HORMONE: CPT

## 2020-05-28 RX ORDER — ASPIRIN 300 MG/1
300 SUPPOSITORY RECTAL DAILY
Status: DISCONTINUED | OUTPATIENT
Start: 2020-05-28 | End: 2020-05-29 | Stop reason: HOSPADM

## 2020-05-28 RX ORDER — LEVOTHYROXINE SODIUM 0.05 MG/1
50 TABLET ORAL
Status: DISCONTINUED | OUTPATIENT
Start: 2020-05-29 | End: 2020-05-29 | Stop reason: HOSPADM

## 2020-05-28 RX ORDER — ONDANSETRON 4 MG/1
4 TABLET, ORALLY DISINTEGRATING ORAL EVERY 4 HOURS PRN
Status: DISCONTINUED | OUTPATIENT
Start: 2020-05-28 | End: 2020-05-29 | Stop reason: HOSPADM

## 2020-05-28 RX ORDER — ONDANSETRON 2 MG/ML
4 INJECTION INTRAMUSCULAR; INTRAVENOUS EVERY 4 HOURS PRN
Status: DISCONTINUED | OUTPATIENT
Start: 2020-05-28 | End: 2020-05-29 | Stop reason: HOSPADM

## 2020-05-28 RX ORDER — AMOXICILLIN 250 MG
2 CAPSULE ORAL 2 TIMES DAILY
Status: DISCONTINUED | OUTPATIENT
Start: 2020-05-28 | End: 2020-05-29 | Stop reason: HOSPADM

## 2020-05-28 RX ORDER — BISACODYL 10 MG
10 SUPPOSITORY, RECTAL RECTAL
Status: DISCONTINUED | OUTPATIENT
Start: 2020-05-28 | End: 2020-05-29 | Stop reason: HOSPADM

## 2020-05-28 RX ORDER — OMEPRAZOLE 20 MG/1
20 CAPSULE, DELAYED RELEASE ORAL DAILY
Status: DISCONTINUED | OUTPATIENT
Start: 2020-05-29 | End: 2020-05-29 | Stop reason: HOSPADM

## 2020-05-28 RX ORDER — NITROGLYCERIN 0.4 MG/1
0.4 TABLET SUBLINGUAL
Status: DISCONTINUED | OUTPATIENT
Start: 2020-05-28 | End: 2020-05-29 | Stop reason: HOSPADM

## 2020-05-28 RX ORDER — CITALOPRAM 20 MG/1
20 TABLET ORAL DAILY
Status: DISCONTINUED | OUTPATIENT
Start: 2020-05-29 | End: 2020-05-29 | Stop reason: HOSPADM

## 2020-05-28 RX ORDER — BENAZEPRIL HYDROCHLORIDE 20 MG/1
20 TABLET ORAL
Status: DISCONTINUED | OUTPATIENT
Start: 2020-05-29 | End: 2020-05-29 | Stop reason: HOSPADM

## 2020-05-28 RX ORDER — POLYETHYLENE GLYCOL 3350 17 G/17G
1 POWDER, FOR SOLUTION ORAL
Status: DISCONTINUED | OUTPATIENT
Start: 2020-05-28 | End: 2020-05-29 | Stop reason: HOSPADM

## 2020-05-28 RX ORDER — ASPIRIN 81 MG/1
324 TABLET, CHEWABLE ORAL DAILY
Status: DISCONTINUED | OUTPATIENT
Start: 2020-05-28 | End: 2020-05-29 | Stop reason: HOSPADM

## 2020-05-28 RX ORDER — ATORVASTATIN CALCIUM 80 MG/1
80 TABLET, FILM COATED ORAL
Status: DISCONTINUED | OUTPATIENT
Start: 2020-05-28 | End: 2020-05-29 | Stop reason: HOSPADM

## 2020-05-28 RX ORDER — ASPIRIN 81 MG/1
81 TABLET, CHEWABLE ORAL DAILY
Status: DISCONTINUED | OUTPATIENT
Start: 2020-05-29 | End: 2020-05-28

## 2020-05-28 RX ORDER — ACETAMINOPHEN 325 MG/1
650 TABLET ORAL EVERY 6 HOURS PRN
Status: DISCONTINUED | OUTPATIENT
Start: 2020-05-28 | End: 2020-05-29 | Stop reason: HOSPADM

## 2020-05-28 RX ORDER — ALBUTEROL SULFATE 90 UG/1
2 AEROSOL, METERED RESPIRATORY (INHALATION)
Status: DISCONTINUED | OUTPATIENT
Start: 2020-05-28 | End: 2020-05-29 | Stop reason: HOSPADM

## 2020-05-28 RX ORDER — ASPIRIN 325 MG
325 TABLET ORAL DAILY
Status: DISCONTINUED | OUTPATIENT
Start: 2020-05-28 | End: 2020-05-29 | Stop reason: HOSPADM

## 2020-05-28 ASSESSMENT — ENCOUNTER SYMPTOMS
DIARRHEA: 0
SORE THROAT: 0
NECK PAIN: 0
DIZZINESS: 0
PALPITATIONS: 0
VOMITING: 0
SHORTNESS OF BREATH: 0
BACK PAIN: 0
NAUSEA: 0
BLURRED VISION: 0
FEVER: 0
FOCAL WEAKNESS: 0
COUGH: 0
FLANK PAIN: 0
DIAPHORESIS: 0
ABDOMINAL PAIN: 0
CHILLS: 0
MYALGIAS: 0
BLOOD IN STOOL: 0
BRUISES/BLEEDS EASILY: 0
WHEEZING: 0
SEIZURES: 0
HEADACHES: 0
SPUTUM PRODUCTION: 0

## 2020-05-28 ASSESSMENT — FIBROSIS 4 INDEX: FIB4 SCORE: 0.97

## 2020-05-29 ENCOUNTER — HOSPITAL ENCOUNTER (OUTPATIENT)
Dept: RADIOLOGY | Facility: MEDICAL CENTER | Age: 60
End: 2020-05-29
Attending: INTERNAL MEDICINE
Payer: COMMERCIAL

## 2020-05-29 LAB
EST. AVERAGE GLUCOSE BLD GHB EST-MCNC: 120 MG/DL
HBA1C MFR BLD: 5.8 % (ref 0–5.6)

## 2020-05-29 PROCEDURE — G0378 HOSPITAL OBSERVATION PER HR: HCPCS

## 2020-05-29 NOTE — ED TRIAGE NOTES
"Zainab Palomino Brooks   60 y.o. female   Chief Complaint   Patient presents with   • Chest Pain     pt reports that she was at working with sudden onset chest pain mid sternal, radiating to the jaw, intermittent, sharp. pain not present at this time.       Pt amb to triage with steady gait for above complaint.   Pt is alert and oriented, speaking in full sentences, follows commands and responds appropriately to questions. NAD. Resp are even and unlabored.     Pt denies any pain at this time, appears comfortable. ECG completed. Charge nurse made aware of patient.     Pt placed in lobby. Pt educated on triage process. Pt encouraged to alert staff for any changes.    /93   Pulse 70   Temp 36.3 °C (97.3 °F)   Resp 18   Ht 1.651 m (5' 5\")   Wt 93.8 kg (206 lb 12.7 oz)   SpO2 94%   BMI 34.41 kg/m²     "

## 2020-05-29 NOTE — ED NOTES
Med rec complete per phone interview with patient. NKDA. No ABX taken in last 14 days. Pt taking ASPIRIN.

## 2020-05-29 NOTE — ED PROVIDER NOTES
ED Provider Note  CHIEF COMPLAINT  Chief Complaint   Patient presents with   • Chest Pain     pt reports that she was at working with sudden onset chest pain mid sternal, radiating to the jaw, intermittent, sharp. pain not present at this time.        HPI  Zainab Guy is a 60 y.o. female who presents with chest pain.  Patient states that she was at work when she had a sudden onset of midsternal chest pain that radiated into her right breast and up into her jaw.  She did just finish eating when the pain started.  Nothing seemed to make it better or worse.  The pain currently has subsided.  She denies any shortness of breath fevers or coughs.  Patient denies any history of blood clots.  She denies any leg swelling or calf pain.  She does have a history of elevated blood pressure.  Denies a history of diabetes.  Denies a history of cardiac problems.  She states she did have a similar episode maybe a year and a half ago and states the tests were negative but she did have a heart murmur.  She denies any back pain.  No known exposure to COVID.    REVIEW OF SYSTEMS  See HPI for further details.  No skin rash, no fevers, no dizziness, no syncope, no back pain, no leg swelling, no hypoglycemia.  all other systems are negative.     PAST MEDICAL HISTORY  Past Medical History:   Diagnosis Date   • GERD (gastroesophageal reflux disease)    • HTN (hypertension)    • Hypercholesteremia    • Hypothyroidism    • Obesity    • Osteoarthritis        FAMILY HISTORY  [unfilled]    SOCIAL HISTORY  Social History     Socioeconomic History   • Marital status: Single     Spouse name: Not on file   • Number of children: Not on file   • Years of education: Not on file   • Highest education level: Not on file   Occupational History   • Not on file   Social Needs   • Financial resource strain: Not on file   • Food insecurity     Worry: Not on file     Inability: Not on file   • Transportation needs     Medical: Not on file      Non-medical: Not on file   Tobacco Use   • Smoking status: Current Every Day Smoker     Packs/day: 0.50     Years: 25.00     Pack years: 12.50     Types: Cigarettes     Last attempt to quit: 10/2004     Years since quitting: 15.6   • Smokeless tobacco: Never Used   Substance and Sexual Activity   • Alcohol use: No   • Drug use: No   • Sexual activity: Not Currently     Birth control/protection: Post-Menopausal   Lifestyle   • Physical activity     Days per week: Not on file     Minutes per session: Not on file   • Stress: Not on file   Relationships   • Social connections     Talks on phone: Not on file     Gets together: Not on file     Attends Anabaptism service: Not on file     Active member of club or organization: Not on file     Attends meetings of clubs or organizations: Not on file     Relationship status: Not on file   • Intimate partner violence     Fear of current or ex partner: Not on file     Emotionally abused: Not on file     Physically abused: Not on file     Forced sexual activity: Not on file   Other Topics Concern   • Not on file   Social History Narrative   • Not on file       SURGICAL HISTORY  Past Surgical History:   Procedure Laterality Date   • ABDOMINAL HYSTERECTOMY TOTAL     • KNEE ORIF Bilateral        CURRENT MEDICATIONS   Home Medications     Reviewed by Itzel Harrison R.N. (Registered Nurse) on 05/28/20 at 1920  Med List Status: Complete   Medication Last Dose Status   albuterol 108 (90 Base) MCG/ACT Aero Soln inhalation aerosol  Active   aspirin (ASA) 81 MG Chew Tab chewable tablet 5/28/2020 Active   atorvastatin (LIPITOR) 80 MG tablet 5/27/2020 Active   azithromycin (ZITHROMAX Z-DILLAN) 250 MG Tab Not Taking Active   benazepril (LOTENSIN) 20 MG Tab 5/28/2020 Active   citalopram (CELEXA) 20 MG Tab 5/28/2020 Active   hydroCHLOROthiazide (HYDRODIURIL) 25 MG Tab 5/28/2020 Active   ketoconazole (NIZORAL) 2 % Cream  Active   levothyroxine (SYNTHROID) 50 MCG Tab 5/28/2020 Active   omeprazole  "(PRILOSEC) 20 MG delayed-release capsule 5/28/2020 Active                ALLERGIES  No Known Allergies    PHYSICAL EXAM  VITAL SIGNS: /94   Pulse 67   Temp 36.3 °C (97.3 °F)   Resp 18   Ht 1.651 m (5' 5\")   Wt 93.8 kg (206 lb 12.7 oz)   SpO2 94%   BMI 34.41 kg/m²       Constitutional: Well developed, No acute distress, Non-toxic appearance.   HENT: Normocephalic, Atraumatic, Bilateral external ears normal, Oropharynx moist  Eyes: PERRL, EOMI, Conjunctiva normal  Neck: Normal range of motion, No tenderness, Supple  Cardiovascular: Normal heart rate, Normal rhythm  Thorax & Lungs: Normal breath sounds, No respiratory distress,  No wheezing, No chest tenderness.   Abdomen: Benign abdominal exam, no guarding no rebound, no pulsatile mass, no tenderness, no distention  Skin: Warm, Dry, No erythema, No rash.   Back: No tenderness, No CVA tenderness.   Extremities: Intact distal pulses, No edema, No tenderness   Neurologic: Alert & oriented x 3, Normal motor function, Normal sensory function, No focal deficits noted.   Psychiatric: appropriate      Labs  Results for orders placed or performed during the hospital encounter of 05/28/20   CBC with Differential   Result Value Ref Range    WBC 7.8 4.8 - 10.8 K/uL    RBC 4.91 4.20 - 5.40 M/uL    Hemoglobin 14.2 12.0 - 16.0 g/dL    Hematocrit 42.7 37.0 - 47.0 %    MCV 87.0 81.4 - 97.8 fL    MCH 28.9 27.0 - 33.0 pg    MCHC 33.3 (L) 33.6 - 35.0 g/dL    RDW 46.7 35.9 - 50.0 fL    Platelet Count 309 164 - 446 K/uL    MPV 9.3 9.0 - 12.9 fL    Neutrophils-Polys 59.40 44.00 - 72.00 %    Lymphocytes 30.90 22.00 - 41.00 %    Monocytes 7.50 0.00 - 13.40 %    Eosinophils 1.50 0.00 - 6.90 %    Basophils 0.60 0.00 - 1.80 %    Immature Granulocytes 0.10 0.00 - 0.90 %    Nucleated RBC 0.00 /100 WBC    Neutrophils (Absolute) 4.61 2.00 - 7.15 K/uL    Lymphs (Absolute) 2.40 1.00 - 4.80 K/uL    Monos (Absolute) 0.58 0.00 - 0.85 K/uL    Eos (Absolute) 0.12 0.00 - 0.51 K/uL    Baso " (Absolute) 0.05 0.00 - 0.12 K/uL    Immature Granulocytes (abs) 0.01 0.00 - 0.11 K/uL    NRBC (Absolute) 0.00 K/uL   EKG (NOW)   Result Value Ref Range    Report       [facility] Emergency Dept.    Test Date:  2020  Pt Name:    MARANDA CESPEDES             Department: ER  MRN:        [id number]                      Room:  Gender:     Female                       Technician: [id number]  :        1960                   Requested By:ER TRIAGE PROTOCOL  Order #:    [id number]                    Reading MD: Lainey Arteaga    Measurements  Intervals                                Axis  Rate:       70                           P:          66  SD:         184                          QRS:        12  QRSD:       92                           T:          60  QT:         428  QTc:        462    Interpretive Statements  SINUS RHYTHM  Compared to ECG 2018 17:38:29  No significant changes  Electronically Signed On 2020 20:41:53 PDT by Lainey Arteaga         RADIOLOGY/PROCEDURES  DX-CHEST-PORTABLE (1 VIEW)   Final Result         1. No acute cardiopulmonary abnormalities are identified.          COURSE & MEDICAL DECISION MAKING  Pertinent Labs & Imaging studies reviewed. (See chart for details)  Patient presents emergency department complaining of chest pain.  The pain currently has subsided.  Patient does have a history of high blood pressure and high cholesterol.  Denies a previous history of cardiac problems.  Patient is not had any fevers or cough and I doubt an infectious etiology for chest pain.  She has not had any leg swelling or calf pain and no history of blood clots in the past.  She not hypoxic here any respiratory distress.  She is not complaining any ripping or tearing back pain.  Pain started after eating and therefore GERD or a gallbladder etiology is also on the differential.  EKG shows no evidence of acute MI.    Laboratory studies show a normal troponin.  There is no  evidence of pancreatitis and she has normal liver function tests.  Chest x-ray was normal.  Plan is to admit the patient to the hospital for further evaluation of possible ACS.    discussed the case with the hospitalist who will see the patient.      FINAL IMPRESSION     1. Acute chest pain             Electronically signed by: Lainey Arias M.D., 5/28/2020 8:36 PM

## 2020-05-29 NOTE — H&P
Hospital Medicine History & Physical Note    Date of Service  5/28/2020    Primary Care Physician  MAURO Rose.    Code Status  Full Code    Chief Complaint  Chief Complaint   Patient presents with   • Chest Pain     pt reports that she was at working with sudden onset chest pain mid sternal, radiating to the jaw, intermittent, sharp. pain not present at this time.        History of Presenting Illness  60 y.o. female with a past medical history of obesity, tobacco abuse who presented 5/28/2020 with chest pain that started earlier today.  The patient states she had a hamburger and shortly thereafter developed substernal chest pain with radiation to her right lower quadrant.  She denies any relieving or exacerbating factors.  She denied any shortness of breath, fevers, chills or cough.  She denies any nausea, vomiting or diarrhea.  Patient denies any family history of MI.  She has been smoking cigarettes these days.    EKG interpreted by me reveals sinus rhythm with no ST elevation or ST depression  Chest x-ray interpreted by me reveals no acute cardiopulmonary process    Review of Systems  Review of Systems   Constitutional: Negative for chills, diaphoresis and fever.   HENT: Negative for hearing loss and sore throat.    Eyes: Negative for blurred vision.   Respiratory: Negative for cough, sputum production, shortness of breath and wheezing.    Cardiovascular: Positive for chest pain. Negative for palpitations and leg swelling.   Gastrointestinal: Negative for abdominal pain, blood in stool, diarrhea, nausea and vomiting.   Genitourinary: Negative for dysuria, flank pain and urgency.   Musculoskeletal: Negative for back pain, joint pain, myalgias and neck pain.   Skin: Negative for rash.   Neurological: Negative for dizziness, focal weakness, seizures and headaches.   Endo/Heme/Allergies: Does not bruise/bleed easily.   Psychiatric/Behavioral: Negative for suicidal ideas.   All other systems reviewed and  are negative.      Past Medical History   has a past medical history of GERD (gastroesophageal reflux disease), HTN (hypertension), Hypercholesteremia, Hypothyroidism, Obesity, and Osteoarthritis.    Surgical History   has a past surgical history that includes knee orif (Bilateral) and abdominal hysterectomy total.     Family History  family history includes Diabetes in her mother; Hypertension in her father and mother.     Social History   reports that she has been smoking cigarettes. She has a 12.50 pack-year smoking history. She has never used smokeless tobacco. She reports that she does not drink alcohol or use drugs.    Allergies  No Known Allergies    Medications  Prior to Admission Medications   Prescriptions Last Dose Informant Patient Reported? Taking?   albuterol 108 (90 Base) MCG/ACT Aero Soln inhalation aerosol PRN at PRN Patient No No   Sig: Inhale 2 Puffs by mouth every 6 hours as needed.   aspirin (ASA) 81 MG Chew Tab chewable tablet 5/28/2020 at 1030 Patient No No   Sig: Take 1 Tab by mouth every day.   atorvastatin (LIPITOR) 80 MG tablet 5/27/2020 at PM Patient No No   Sig: TAKE 1 TABLET BY MOUTH EVERY DAY AT BEDTIME   benazepril (LOTENSIN) 20 MG Tab 5/28/2020 at 1030 Patient No No   Sig: Take 1 tablet by mouth once daily   citalopram (CELEXA) 20 MG Tab 5/28/2020 at 1030 Patient No No   Sig: Take 1 tablet by mouth once daily   hydroCHLOROthiazide (HYDRODIURIL) 25 MG Tab 5/28/2020 at 1030 Patient No No   Sig: Take 1 tablet by mouth once daily   levothyroxine (SYNTHROID) 50 MCG Tab 5/28/2020 at 1030 Patient No No   Sig: TAKE 1 TABLET BY MOUTH ONCE DAILY IN THE MORNING ON  AN  EMPTY  STOMACH   omeprazole (PRILOSEC) 20 MG delayed-release capsule 5/28/2020 at 1030 Patient Yes No   Sig: Take 1 Cap by mouth every day.      Facility-Administered Medications: None       Physical Exam  Temp:  [36.3 °C (97.3 °F)] 36.3 °C (97.3 °F)  Pulse:  [62-70] 62  Resp:  [13-21] 13  BP: (137-163)/(66-94) 162/74  SpO2:  [92  %-97 %] 94 %    Physical Exam  Vitals signs and nursing note reviewed.   Constitutional:       General: She is not in acute distress.     Appearance: Normal appearance. She is obese.   HENT:      Head: Normocephalic and atraumatic.      Nose: Nose normal.      Mouth/Throat:      Mouth: Mucous membranes are moist.   Eyes:      Extraocular Movements: Extraocular movements intact.      Conjunctiva/sclera: Conjunctivae normal.      Pupils: Pupils are equal, round, and reactive to light.   Neck:      Musculoskeletal: Normal range of motion and neck supple.   Cardiovascular:      Rate and Rhythm: Normal rate and regular rhythm.      Pulses: Normal pulses.      Heart sounds: Normal heart sounds.   Pulmonary:      Effort: Pulmonary effort is normal. No respiratory distress.      Breath sounds: Normal breath sounds. No wheezing, rhonchi or rales.   Abdominal:      General: Bowel sounds are normal. There is no distension.      Palpations: Abdomen is soft.      Tenderness: There is no abdominal tenderness.   Musculoskeletal: Normal range of motion.         General: No swelling or tenderness.   Lymphadenopathy:      Cervical: No cervical adenopathy.   Skin:     General: Skin is warm.      Coloration: Skin is not jaundiced.      Findings: No rash.   Neurological:      General: No focal deficit present.      Mental Status: She is alert and oriented to person, place, and time.      Cranial Nerves: No cranial nerve deficit.      Motor: No weakness.   Psychiatric:         Mood and Affect: Mood normal.         Behavior: Behavior normal.         Laboratory:  Recent Labs     05/28/20 2004   WBC 7.8   RBC 4.91   HEMOGLOBIN 14.2   HEMATOCRIT 42.7   MCV 87.0   MCH 28.9   MCHC 33.3*   RDW 46.7   PLATELETCT 309   MPV 9.3     Recent Labs     05/28/20 2004   SODIUM 139   POTASSIUM 3.8   CHLORIDE 98   CO2 25   GLUCOSE 87   BUN 19   CREATININE 0.71   CALCIUM 9.6     Recent Labs     05/28/20 2004   ALTSGPT 34   ASTSGOT 28   ALKPHOSPHAT 99    TBILIRUBIN 0.6   LIPASE 48   GLUCOSE 87         No results for input(s): NTPROBNP in the last 72 hours.      Recent Labs     05/28/20 2004   TROPONINT 8       Imaging:  DX-CHEST-PORTABLE (1 VIEW)   Final Result         1. No acute cardiopulmonary abnormalities are identified.      NM-CARDIAC STRESS TEST    (Results Pending)   US-RUQ    (Results Pending)         Assessment/Plan:  Patient will be placed under observation status      Chest pain- (present on admission)  Assessment & Plan  Rule out ACS  Initial EKG and troponin negative for ischemia  Continuous cardiac monitoring with serial EKG and troponin  Nuclear medicine cardiac stress test in the morning if troponin remains negative  Patient has been given full dose of aspirin  Check lipid panel, TSH and hemoglobin A1c  Nitro when necessary for chest pain\    Mild intermittent asthma without complication- (present on admission)  Assessment & Plan  No acute exacerbation at this time  Albuterol as needed  RT protocol    Mixed hyperlipidemia- (present on admission)  Assessment & Plan  Continue atorvastatin 80    Obesity- (present on admission)  Assessment & Plan  Body mass index is 34.41 kg/m².  Patient has been counseled on diet and lifestyle modifications      Hypothyroidism due to acquired atrophy of thyroid- (present on admission)  Assessment & Plan  Check TSH  Continue Synthroid 50    Essential hypertension- (present on admission)  Assessment & Plan  Continue Lotensin

## 2020-05-29 NOTE — PROGRESS NOTES
"Pt arrived to unit via gurney at 2330. Pt states \"I don't want to stay unless I have a private bed, not with all this covid shit\". Explained to the pt that there are no open private beds, this unit does not have any covid pt and the importance of her procedures in the morning. Pt is adamant to go home. Dr. Esteves notified. Explained to pt the risks, to follow up with her PCP and to return to ER immediately with sign of chest pain. Took out pt IV and cut off armband. Pt left AMA, left floor with RN via wheelchair.   "

## 2020-05-30 NOTE — DISCHARGE SUMMARY
Discharge Summary    CHIEF COMPLAINT ON ADMISSION  Chief Complaint   Patient presents with   • Chest Pain     pt reports that she was at working with sudden onset chest pain mid sternal, radiating to the jaw, intermittent, sharp. pain not present at this time.        Reason for Admission  Chest Pain     Admission Date  5/28/2020    CODE STATUS  Prior    HPI & HOSPITAL COURSE  60 y.o. female with a past medical history of obesity, tobacco abuse who presented 5/28/2020 with chest pain that started earlier today.  The patient states she had a hamburger and shortly thereafter developed substernal chest pain with radiation to her right lower quadrant.  She did not have any active chest pain.  She was placed in observation for trending troponins and undergoing stress test in the morning however the patient decided to leave AMA because she did not want to stay in the undergo stress testing as outpatient unless she got a private room considering the COVID pandemic.  She was explained the risks of leaving including MI and death.  She has been instructed to return to the ER if she develops recurrent chest pain.  Patient will follow up with PCP and undergo stress testing as an outpatient       Therefore, she is discharged in guarded and stable condition to home with close outpatient follow-up.    Left AMA    Discharge Date  5/29/2020    FOLLOW UP ITEMS POST DISCHARGE  Follow up with PCP    DISCHARGE DIAGNOSES  Active Problems:    Chest pain POA: Yes    Hypothyroidism POA: Yes    Obesity POA: Yes    Mixed hyperlipidemia POA: Yes    Dyslipidemia POA: Yes    Mild intermittent asthma without complication POA: Yes    Essential hypertension POA: Yes    Hypothyroidism due to acquired atrophy of thyroid POA: Yes  Resolved Problems:    * No resolved hospital problems. *      FOLLOW UP  Future Appointments   Date Time Provider Department Center   6/1/2020 10:00 AM [facility]  [address]  [facility] [address]  [place] [address]  [phone]            MEDICATIONS ON DISCHARGE     Medication List      ASK your doctor about these medications      Instructions   albuterol 108 (90 Base) MCG/ACT Aers inhalation aerosol   Inhale 2 Puffs by mouth every 6 hours as needed.  Dose:  2 Puff     aspirin 81 MG Chew chewable tablet  Commonly known as:  ASA   Take 1 Tab by mouth every day.  Dose:  81 mg     atorvastatin 80 MG tablet  Commonly known as:  LIPITOR   TAKE 1 TABLET BY MOUTH EVERY DAY AT BEDTIME     benazepril 20 MG Tabs  Commonly known as:  LOTENSIN   Take 1 tablet by mouth once daily     citalopram 20 MG Tabs  Commonly known as:  CELEXA   Take 1 tablet by mouth once daily     hydroCHLOROthiazide 25 MG Tabs  Commonly known as:  HYDRODIURIL   Take 1 tablet by mouth once daily     levothyroxine 50 MCG Tabs  Commonly known as:  SYNTHROID   TAKE 1 TABLET BY MOUTH ONCE DAILY IN THE MORNING ON  AN  EMPTY  STOMACH     omeprazole 20 MG delayed-release capsule  Commonly known as:  PRILOSEC   Take 1 Cap by mouth every day.  Dose:  20 mg            Allergies  No Known Allergies    DIET  No orders of the defined types were placed in this encounter.    LABORATORY  Lab Results   Component Value Date    SODIUM 139 05/28/2020    POTASSIUM 3.8 05/28/2020    CHLORIDE 98 05/28/2020    CO2 25 05/28/2020    GLUCOSE 87 05/28/2020    BUN 19 05/28/2020    CREATININE 0.71 05/28/2020        Lab Results   Component Value Date    WBC 7.8 05/28/2020    HEMOGLOBIN 14.2 05/28/2020    HEMATOCRIT 42.7 05/28/2020    PLATELETCT 309 05/28/2020

## 2020-06-01 ENCOUNTER — OFFICE VISIT (OUTPATIENT)
Dept: MEDICAL GROUP | Facility: MEDICAL CENTER | Age: 60
End: 2020-06-01
Payer: COMMERCIAL

## 2020-06-01 VITALS
HEART RATE: 79 BPM | TEMPERATURE: 98.7 F | OXYGEN SATURATION: 95 % | SYSTOLIC BLOOD PRESSURE: 124 MMHG | WEIGHT: 197 LBS | HEIGHT: 65 IN | RESPIRATION RATE: 16 BRPM | BODY MASS INDEX: 32.82 KG/M2 | DIASTOLIC BLOOD PRESSURE: 72 MMHG

## 2020-06-01 DIAGNOSIS — E66.9 OBESITY (BMI 30-39.9): ICD-10-CM

## 2020-06-01 DIAGNOSIS — I77.810 ASCENDING AORTA DILATATION (HCC): ICD-10-CM

## 2020-06-01 DIAGNOSIS — R73.01 IMPAIRED FASTING GLUCOSE: ICD-10-CM

## 2020-06-01 DIAGNOSIS — E03.4 HYPOTHYROIDISM DUE TO ACQUIRED ATROPHY OF THYROID: ICD-10-CM

## 2020-06-01 DIAGNOSIS — I10 ESSENTIAL HYPERTENSION: ICD-10-CM

## 2020-06-01 DIAGNOSIS — R07.9 CHEST PAIN, UNSPECIFIED TYPE: ICD-10-CM

## 2020-06-01 DIAGNOSIS — Z23 NEED FOR TDAP VACCINATION: ICD-10-CM

## 2020-06-01 PROCEDURE — 99214 OFFICE O/P EST MOD 30 MIN: CPT | Mod: 25 | Performed by: NURSE PRACTITIONER

## 2020-06-01 PROCEDURE — 90715 TDAP VACCINE 7 YRS/> IM: CPT | Performed by: NURSE PRACTITIONER

## 2020-06-01 PROCEDURE — 90471 IMMUNIZATION ADMIN: CPT | Performed by: NURSE PRACTITIONER

## 2020-06-01 ASSESSMENT — FIBROSIS 4 INDEX: FIB4 SCORE: 0.9

## 2020-06-01 NOTE — PROGRESS NOTES
Subjective:      Zainab Guy is a 60 y.o. female who presents with Hospital Follow-up    CC: Patient here for emergency room follow-up with radiating chest pain.         HPI 1. Chest pain, unspecified type  Patient states she had just started her shift at [employer] when she developed pain in the right side of her chest which then started radiating into the right side of her neck so she went by ambulance to the emergency room for evaluation.    Patient has had chest pressure in 2018 as well and her work-up at that time was negative including a stress test which showed no evidence of significant jeopardized viable myocardium or prior MI.  Her echocardiogram showed ejection fraction at 65% with normal valvular function.  An ultrasound of the abdomen showed fatty liver but no evidence of acute cholecystitis.    Her work-up at the emergency room was limited because she declined to be admitted but her one troponin was normal and she was advised to do a stress test outpatient and follow with cardiology.  She states she has had no chest pain since she was in the hospital 3 days ago.  She denies abdominal pain, nausea or vomiting.  She is taking acid reflux medicine daily and does not remember straining the muscles in her chest wall.  She denies shortness of breath or cough.      2. Impaired fasting glucose  Patient is hemoglobin A1c from the hospital came back mildly elevated at 5.8 patient has no history of diabetes but states she has been trying to lose weight since discharge to bring her blood sugars under control     3. Essential hypertension  Patient continues on her benazepril and HCTZ with blood pressure controlled in the office today.    4. Obesity (BMI 30-39.9)  Patient is obese but states she has lost a few pounds since starting to diet post ER visit.      5. Need for Tdap vaccination  Patient due for vaccine    6. Hypothyroidism due to acquired atrophy of thyroid  Patient's most recent TSH was therapeutic on  her levothyroxine 50 mcg    7. Ascending aorta dilatation (HCC)  This was a finding on her echocardiogram from November 2018 and it was only at 3.9 cm.  Past Medical History:   Diagnosis Date   • GERD (gastroesophageal reflux disease)    • HTN (hypertension)    • Hypercholesteremia    • Hypothyroidism    • Obesity    • Osteoarthritis      Social History     Socioeconomic History   • Marital status: Single     Spouse name: Not on file   • Number of children: Not on file   • Years of education: Not on file   • Highest education level: Not on file   Occupational History   • Not on file   Social Needs   • Financial resource strain: Not on file   • Food insecurity     Worry: Not on file     Inability: Not on file   • Transportation needs     Medical: Not on file     Non-medical: Not on file   Tobacco Use   • Smoking status: Current Every Day Smoker     Packs/day: 0.50     Years: 25.00     Pack years: 12.50     Types: Cigarettes     Last attempt to quit: 10/2004     Years since quitting: 15.6   • Smokeless tobacco: Never Used   Substance and Sexual Activity   • Alcohol use: No   • Drug use: No   • Sexual activity: Not Currently     Birth control/protection: Post-Menopausal   Lifestyle   • Physical activity     Days per week: Not on file     Minutes per session: Not on file   • Stress: Not on file   Relationships   • Social connections     Talks on phone: Not on file     Gets together: Not on file     Attends Congregation service: Not on file     Active member of club or organization: Not on file     Attends meetings of clubs or organizations: Not on file     Relationship status: Not on file   • Intimate partner violence     Fear of current or ex partner: Not on file     Emotionally abused: Not on file     Physically abused: Not on file     Forced sexual activity: Not on file   Other Topics Concern   • Not on file   Social History Narrative   • Not on file     Current Outpatient Medications   Medication Sig Dispense Refill  "  • citalopram (CELEXA) 20 MG Tab Take 1 tablet by mouth once daily 90 Tab 0   • benazepril (LOTENSIN) 20 MG Tab Take 1 tablet by mouth once daily 90 Tab 0   • atorvastatin (LIPITOR) 80 MG tablet TAKE 1 TABLET BY MOUTH EVERY DAY AT BEDTIME 90 Tab 0   • levothyroxine (SYNTHROID) 50 MCG Tab TAKE 1 TABLET BY MOUTH ONCE DAILY IN THE MORNING ON  AN  EMPTY  STOMACH 90 Tab 0   • hydroCHLOROthiazide (HYDRODIURIL) 25 MG Tab Take 1 tablet by mouth once daily 90 Tab 0   • albuterol 108 (90 Base) MCG/ACT Aero Soln inhalation aerosol Inhale 2 Puffs by mouth every 6 hours as needed. 8.5 g 11   • aspirin (ASA) 81 MG Chew Tab chewable tablet Take 1 Tab by mouth every day. 100 Tab 3   • omeprazole (PRILOSEC) 20 MG delayed-release capsule Take 1 Cap by mouth every day. 30 Cap      No current facility-administered medications for this visit.      Family History   Problem Relation Age of Onset   • Diabetes Mother    • Hypertension Mother    • Hypertension Father          Review of Systems   Cardiovascular: Positive for chest pain.   All other systems reviewed and are negative.         Objective:     /72 (BP Location: Right arm, Patient Position: Sitting, BP Cuff Size: Adult)   Pulse 79   Temp 37.1 °C (98.7 °F) (Temporal)   Resp 16   Ht 1.651 m (5' 5\")   Wt 89.4 kg (197 lb)   SpO2 95%   BMI 32.78 kg/m²      Physical Exam  Vitals signs and nursing note reviewed.   Constitutional:       General: She is not in acute distress.     Appearance: She is well-developed. She is not diaphoretic.   HENT:      Head: Normocephalic and atraumatic.      Right Ear: External ear normal.      Left Ear: External ear normal.      Nose: Nose normal.   Eyes:      General:         Right eye: No discharge.         Left eye: No discharge.   Neck:      Musculoskeletal: Normal range of motion and neck supple.      Thyroid: No thyromegaly.   Cardiovascular:      Rate and Rhythm: Normal rate and regular rhythm.      Heart sounds: Normal heart sounds. No " murmur. No friction rub. No gallop.    Pulmonary:      Effort: Pulmonary effort is normal.      Breath sounds: Normal breath sounds. No wheezing or rales.   Musculoskeletal:         General: No tenderness.   Skin:     General: Skin is warm and dry.      Findings: No rash.   Neurological:      Mental Status: She is alert and oriented to person, place, and time.      Deep Tendon Reflexes: Reflexes normal.   Psychiatric:         Behavior: Behavior normal.         Thought Content: Thought content normal.         Judgment: Judgment normal.                 Assessment/Plan:       1. Chest pain, unspecified type  Patient currently asymptomatic and work-up from 2018 for chest pain did not show abnormalities but with her pain radiating into her neck I agree she should have a stress test I will have her follow with cardiology.  She will continue with her statin, aspirin and antihypertensive medicines.    - NM-CARDIAC STRESS TEST; Future  - REFERRAL TO CARDIOLOGY    2. Impaired fasting glucose  I reviewed with patient her recent findings showing prediabetes and the best way to prevent becoming a type II diabetic    3. Essential hypertension  Blood pressure controlled on current medicines and no changes made    4. Obesity (BMI 30-39.9)  Patient praised for planning on dieting and losing some weight and she will continue to do this.    5. Need for Tdap vaccination  I have placed the below orders and discussed them with an approved delegating provider. The MA is performing the below orders under the direction of Dr. Thompson    - Tdap =>6yo IM    6. Hypothyroidism due to acquired atrophy of thyroid  TSH therapeutic on current dose of levothyroxine    7. Ascending aorta dilatation (HCC)  Echocardiogram from 2018 showed just mildly elevated at 3.9 cm and she may need a repeat echocardiogram soon.

## 2020-06-08 ENCOUNTER — HOSPITAL ENCOUNTER (OUTPATIENT)
Dept: RADIOLOGY | Facility: MEDICAL CENTER | Age: 60
End: 2020-06-08
Attending: NURSE PRACTITIONER
Payer: COMMERCIAL

## 2020-06-08 DIAGNOSIS — R07.9 CHEST PAIN, UNSPECIFIED TYPE: ICD-10-CM

## 2020-06-08 PROCEDURE — A9502 TC99M TETROFOSMIN: HCPCS

## 2020-06-08 PROCEDURE — 700111 HCHG RX REV CODE 636 W/ 250 OVERRIDE (IP)

## 2020-06-08 RX ORDER — REGADENOSON 0.08 MG/ML
INJECTION, SOLUTION INTRAVENOUS
Status: COMPLETED
Start: 2020-06-08 | End: 2020-06-08

## 2020-06-08 RX ADMIN — REGADENOSON 0.4 MG: 0.08 INJECTION, SOLUTION INTRAVENOUS at 11:50

## 2020-06-08 NOTE — PROGRESS NOTES
Patient presents to [facility] suite for pharmacological cardiac stress test with MPI. Nursing goals identified: knowledge deficit, potential for anxiety r/t stress test, potential for compromised cardiac output. Care plan includes patient education and reassurance, and access to ACLS cart/team. Allergies, history, labs and ECG reviewed. No caffeine consumption by patient and NPO confirmed. Resting images attained. Patient prepped for pharmacological cardiac stress study. Lexiscan infused over 12 seconds. Observed signs and reported symptoms include: syncope, denied CP. Caffeinated beverage refused. Symptoms resolved. Patient tolerated procedure well.

## 2020-06-12 ENCOUNTER — HOSPITAL ENCOUNTER (EMERGENCY)
Facility: MEDICAL CENTER | Age: 60
End: 2020-06-13
Attending: EMERGENCY MEDICINE
Payer: COMMERCIAL

## 2020-06-12 DIAGNOSIS — R42 DIZZINESS: ICD-10-CM

## 2020-06-12 DIAGNOSIS — R41.0 CONFUSION AND DISORIENTATION: ICD-10-CM

## 2020-06-12 LAB
ALBUMIN SERPL BCP-MCNC: 4.9 G/DL (ref 3.2–4.9)
ALBUMIN/GLOB SERPL: 1.6 G/DL
ALP SERPL-CCNC: 77 U/L (ref 30–99)
ALT SERPL-CCNC: 44 U/L (ref 2–50)
ANION GAP SERPL CALC-SCNC: 17 MMOL/L (ref 7–16)
APPEARANCE UR: CLEAR
AST SERPL-CCNC: 39 U/L (ref 12–45)
BASOPHILS # BLD AUTO: 0.4 % (ref 0–1.8)
BASOPHILS # BLD: 0.04 K/UL (ref 0–0.12)
BILIRUB SERPL-MCNC: 0.7 MG/DL (ref 0.1–1.5)
BILIRUB UR QL STRIP.AUTO: NEGATIVE
BUN SERPL-MCNC: 13 MG/DL (ref 8–22)
CALCIUM SERPL-MCNC: 9.8 MG/DL (ref 8.5–10.5)
CHLORIDE SERPL-SCNC: 100 MMOL/L (ref 96–112)
CO2 SERPL-SCNC: 22 MMOL/L (ref 20–33)
COLOR UR: YELLOW
CREAT SERPL-MCNC: 0.54 MG/DL (ref 0.5–1.4)
EOSINOPHIL # BLD AUTO: 0.01 K/UL (ref 0–0.51)
EOSINOPHIL NFR BLD: 0.1 % (ref 0–6.9)
ERYTHROCYTE [DISTWIDTH] IN BLOOD BY AUTOMATED COUNT: 44.1 FL (ref 35.9–50)
GLOBULIN SER CALC-MCNC: 3 G/DL (ref 1.9–3.5)
GLUCOSE SERPL-MCNC: 105 MG/DL (ref 65–99)
GLUCOSE UR STRIP.AUTO-MCNC: NEGATIVE MG/DL
HCT VFR BLD AUTO: 42.2 % (ref 37–47)
HGB BLD-MCNC: 14.4 G/DL (ref 12–16)
IMM GRANULOCYTES # BLD AUTO: 0.04 K/UL (ref 0–0.11)
IMM GRANULOCYTES NFR BLD AUTO: 0.4 % (ref 0–0.9)
KETONES UR STRIP.AUTO-MCNC: 15 MG/DL
LACTATE BLD-SCNC: 1.2 MMOL/L (ref 0.5–2)
LEUKOCYTE ESTERASE UR QL STRIP.AUTO: NEGATIVE
LYMPHOCYTES # BLD AUTO: 0.91 K/UL (ref 1–4.8)
LYMPHOCYTES NFR BLD: 10.2 % (ref 22–41)
MCH RBC QN AUTO: 29.1 PG (ref 27–33)
MCHC RBC AUTO-ENTMCNC: 34.1 G/DL (ref 33.6–35)
MCV RBC AUTO: 85.4 FL (ref 81.4–97.8)
MICRO URNS: ABNORMAL
MONOCYTES # BLD AUTO: 0.29 K/UL (ref 0–0.85)
MONOCYTES NFR BLD AUTO: 3.3 % (ref 0–13.4)
NEUTROPHILS # BLD AUTO: 7.6 K/UL (ref 2–7.15)
NEUTROPHILS NFR BLD: 85.6 % (ref 44–72)
NITRITE UR QL STRIP.AUTO: NEGATIVE
NRBC # BLD AUTO: 0 K/UL
NRBC BLD-RTO: 0 /100 WBC
PH UR STRIP.AUTO: 6 [PH] (ref 5–8)
PLATELET # BLD AUTO: 337 K/UL (ref 164–446)
PMV BLD AUTO: 9.2 FL (ref 9–12.9)
POTASSIUM SERPL-SCNC: 4 MMOL/L (ref 3.6–5.5)
PROT SERPL-MCNC: 7.9 G/DL (ref 6–8.2)
PROT UR QL STRIP: NEGATIVE MG/DL
RBC # BLD AUTO: 4.94 M/UL (ref 4.2–5.4)
RBC UR QL AUTO: NEGATIVE
SODIUM SERPL-SCNC: 139 MMOL/L (ref 135–145)
SP GR UR STRIP.AUTO: 1.01
TROPONIN T SERPL-MCNC: 6 NG/L (ref 6–19)
UROBILINOGEN UR STRIP.AUTO-MCNC: 0.2 MG/DL
WBC # BLD AUTO: 8.9 K/UL (ref 4.8–10.8)

## 2020-06-12 PROCEDURE — 80053 COMPREHEN METABOLIC PANEL: CPT

## 2020-06-12 PROCEDURE — 84484 ASSAY OF TROPONIN QUANT: CPT

## 2020-06-12 PROCEDURE — 85025 COMPLETE CBC W/AUTO DIFF WBC: CPT

## 2020-06-12 PROCEDURE — 83605 ASSAY OF LACTIC ACID: CPT

## 2020-06-12 PROCEDURE — 99283 EMERGENCY DEPT VISIT LOW MDM: CPT

## 2020-06-12 PROCEDURE — 93005 ELECTROCARDIOGRAM TRACING: CPT | Performed by: EMERGENCY MEDICINE

## 2020-06-12 PROCEDURE — 36415 COLL VENOUS BLD VENIPUNCTURE: CPT

## 2020-06-12 PROCEDURE — 81003 URINALYSIS AUTO W/O SCOPE: CPT

## 2020-06-12 NOTE — Clinical Note
Zainab Gyu was seen and treated in our emergency department on 6/12/2020.  She may return to work on 06/15/2020.       If you have any questions or concerns, please don't hesitate to call.      Corby Fang M.D.

## 2020-06-13 VITALS
HEART RATE: 67 BPM | RESPIRATION RATE: 19 BRPM | TEMPERATURE: 98.1 F | OXYGEN SATURATION: 97 % | DIASTOLIC BLOOD PRESSURE: 84 MMHG | SYSTOLIC BLOOD PRESSURE: 157 MMHG

## 2020-06-13 LAB — EKG IMPRESSION: NORMAL

## 2020-06-13 NOTE — ED PROVIDER NOTES
"ED Provider Note  CHIEF COMPLAINT  No chief complaint on file.      HPI  Zainab Guy is a 60 y.o. female who presents for evaluation of feelings of lightheadedness and \"dizziness\" which does not include vertigo.  Patient notes she first felt the symptoms this evening while she was preparing to go to her night shift at work and includes the fact that she felt confused and specified that she felt like she was \"in a dream\".  She states she had to stop and hold herself up on a peer but denies having any shortness of breath, chest pain, nausea, or sweating.  She states the feeling resolved to some degree with sitting down however she felt she could not work and her boss told her to come to the emergency department to be evaluated.    REVIEW OF SYSTEMS  Constitutional: No fevers or chills  Skin: No rashes, abrasions, lacerations, or pruritus  HEENT: No ear pain, ringing in ears, or decreased hearing. No sore throat, runny nose, sores, trouble swallowing, trouble speaking.  Neck: No neck pain, stiffness, or masses.  Chest: No pain or rashes  Pulm: No shortness of breath, cough, wheezing, stridor, or pain with inspiration/expiration  Gastrointestinal: No nausea, vomiting, diarrhea, constipation, bloating, melena, hematochezia or abdominal pain.  Genitourinary: No dysuria or hematuria  Musculoskeletal: No recent trauma, pain, swelling, weakness  Neurologic: No sensory or motor changes. No current confusion or disorientation.  Heme: No bleeding or bruising problems.   Immuno: No hx of recurrent infections      PAST MEDICAL HISTORY   has a past medical history of GERD (gastroesophageal reflux disease), HTN (hypertension), Hypercholesteremia, Hypothyroidism, Obesity, and Osteoarthritis.    SOCIAL HISTORY  Social History     Tobacco Use   • Smoking status: Current Every Day Smoker     Packs/day: 0.50     Years: 25.00     Pack years: 12.50     Types: Cigarettes     Last attempt to quit: 10/2004     Years since quitting: " 15.7   • Smokeless tobacco: Never Used   Substance and Sexual Activity   • Alcohol use: No   • Drug use: No   • Sexual activity: Not Currently     Birth control/protection: Post-Menopausal       SURGICAL HISTORY   has a past surgical history that includes knee orif (Bilateral) and abdominal hysterectomy total.    CURRENT MEDICATIONS  Home Medications    **Home medications have not yet been reviewed for this encounter**         ALLERGIES  No Known Allergies    PHYSICAL EXAM  VITAL SIGNS: /84   Pulse 67   Temp 36.7 °C (98.1 °F) (Temporal)   Resp 19   SpO2 97%    Gen: Alert in no apparent distress.  HEENT: No signs of trauma, Bilateral external ears normal, Nose normal. Conjunctiva normal, Non-icteric.   Neck:  No tenderness, Supple, No masses  Lymphatic: No cervical lymphadenopathy noted.   Cardiovascular: Regular rate and rhythm, no murmurs.  Capillary refill less than 3 seconds to all extremities, 2+ distal pulses.  Thorax & Lungs: Normal breath sounds, No respiratory distress, No wheezing bilateral chest rise  Abdomen: Bowel sounds normal, Soft, No tenderness, No masses, No pulsatile masses. No Guarding or rebound  Skin: Warm, Dry, No erythema, No rash noted to exposed areas.    Extremities: Intact distal pulses, No edema  Neurologic: Alert , no facial droop, grossly normal coordination and strength  Psychiatric: Affect normal, Judgment normal, Mood normal.       LABS  Results for orders placed or performed during the hospital encounter of 06/12/20   CBC WITH DIFFERENTIAL   Result Value Ref Range    WBC 8.9 4.8 - 10.8 K/uL    RBC 4.94 4.20 - 5.40 M/uL    Hemoglobin 14.4 12.0 - 16.0 g/dL    Hematocrit 42.2 37.0 - 47.0 %    MCV 85.4 81.4 - 97.8 fL    MCH 29.1 27.0 - 33.0 pg    MCHC 34.1 33.6 - 35.0 g/dL    RDW 44.1 35.9 - 50.0 fL    Platelet Count 337 164 - 446 K/uL    MPV 9.2 9.0 - 12.9 fL    Neutrophils-Polys 85.60 (H) 44.00 - 72.00 %    Lymphocytes 10.20 (L) 22.00 - 41.00 %    Monocytes 3.30 0.00 - 13.40  %    Eosinophils 0.10 0.00 - 6.90 %    Basophils 0.40 0.00 - 1.80 %    Immature Granulocytes 0.40 0.00 - 0.90 %    Nucleated RBC 0.00 /100 WBC    Neutrophils (Absolute) 7.60 (H) 2.00 - 7.15 K/uL    Lymphs (Absolute) 0.91 (L) 1.00 - 4.80 K/uL    Monos (Absolute) 0.29 0.00 - 0.85 K/uL    Eos (Absolute) 0.01 0.00 - 0.51 K/uL    Baso (Absolute) 0.04 0.00 - 0.12 K/uL    Immature Granulocytes (abs) 0.04 0.00 - 0.11 K/uL    NRBC (Absolute) 0.00 K/uL   COMP METABOLIC PANEL   Result Value Ref Range    Sodium 139 135 - 145 mmol/L    Potassium 4.0 3.6 - 5.5 mmol/L    Chloride 100 96 - 112 mmol/L    Co2 22 20 - 33 mmol/L    Anion Gap 17.0 (H) 7.0 - 16.0    Glucose 105 (H) 65 - 99 mg/dL    Bun 13 8 - 22 mg/dL    Creatinine 0.54 0.50 - 1.40 mg/dL    Calcium 9.8 8.5 - 10.5 mg/dL    AST(SGOT) 39 12 - 45 U/L    ALT(SGPT) 44 2 - 50 U/L    Alkaline Phosphatase 77 30 - 99 U/L    Total Bilirubin 0.7 0.1 - 1.5 mg/dL    Albumin 4.9 3.2 - 4.9 g/dL    Total Protein 7.9 6.0 - 8.2 g/dL    Globulin 3.0 1.9 - 3.5 g/dL    A-G Ratio 1.6 g/dL   TROPONIN   Result Value Ref Range    Troponin T 6 6 - 19 ng/L   LACTIC ACID   Result Value Ref Range    Lactic Acid 1.2 0.5 - 2.0 mmol/L   URINALYSIS CULTURE, IF INDICATED    Specimen: Urine   Result Value Ref Range    Color Yellow     Character Clear     Specific Gravity 1.010 <1.035    Ph 6.0 5.0 - 8.0    Glucose Negative Negative mg/dL    Ketones 15 (A) Negative mg/dL    Protein Negative Negative mg/dL    Bilirubin Negative Negative    Urobilinogen, Urine 0.2 Negative    Nitrite Negative Negative    Leukocyte Esterase Negative Negative    Occult Blood Negative Negative    Micro Urine Req see below    ESTIMATED GFR   Result Value Ref Range    GFR If African American >60 >60 mL/min/1.73 m 2    GFR If Non African American >60 >60 mL/min/1.73 m 2   EKG (NOW)   Result Value Ref Range    Report       [facility] Emergency Dept.    Test Date:  2020-06-12  Pt Name:    MARANDA CESPEDES              Department: ER  MRN:        [id number]                      Room:       [facility]  Gender:     Female                       Technician: [id number]  :        1960                   Requested By:LIBBY OVALLE  Order #:    [id number]                    Reading MD: Libby Ovalle MD    Measurements  Intervals                                Axis  Rate:       62                           P:          41  AK:         196                          QRS:        -2  QRSD:       92                           T:          50  QT:         456  QTc:        463    Interpretive Statements  SINUS RHYTHM  PROBABLE INFERIOR INFARCT, OLD  Compared to ECG 2020 19:09:32  Electronically Signed On 2020 0:04:34 PDT by Libby Ovalle MD               COURSE & MEDICAL DECISION MAKING  Patient arrived for evaluation of somewhat nebulous symptoms which sound as if she may have been presyncopal.  She noted lightheadedness and a sense of confusion but did not note any unilateral sensory or motor changes.  She did not have difficulty walking or speaking and had no visual disturbances.  She had no headache and has had no recent fevers, chills, vomiting, diarrhea, hematuria, or dysuria.  Given her findings I feel a broad laboratory evaluation for screening purposes is necessary.  Her EKG was not normal but there did not appear to be any new changes of a significant nature.  She also did not have any symptoms that would suggest an anginal equivalent.  We will reevaluate the patient after labs.    12:03 AM  Patient resting comfortably.  She does appear tired but has no recurrent symptoms.  I discussed options with her and I felt discharged home with a few days off work might help as there is certainly no evidence for an emergent process.  Specifically, I do not suspect CVA, TIA or cardiac abnormality at this time.  The patient did feel comfortable with this plan and noted that she would return if her symptoms worsen or change in any way and  otherwise follow-up with her primary care physician.    FINAL IMPRESSION  1. Dizziness    2. Confusion and disorientation        Electronically signed by: Corby Fang M.D., 6/12/2020 8:50 PM

## 2020-06-13 NOTE — ED NOTES
[facility] Pt states she has had loss of appetite for one day, and body aches for one day. No complaints of fever. Pt states confusion as well and lethargy

## 2020-08-07 ENCOUNTER — HOSPITAL ENCOUNTER (EMERGENCY)
Facility: MEDICAL CENTER | Age: 60
End: 2020-08-08
Attending: EMERGENCY MEDICINE
Payer: COMMERCIAL

## 2020-08-07 DIAGNOSIS — F32.A DEPRESSION, UNSPECIFIED DEPRESSION TYPE: ICD-10-CM

## 2020-08-07 DIAGNOSIS — F10.920 ALCOHOLIC INTOXICATION WITHOUT COMPLICATION (HCC): ICD-10-CM

## 2020-08-07 LAB — POC BREATHALIZER: 0.22 PERCENT (ref 0–0.01)

## 2020-08-07 PROCEDURE — C9803 HOPD COVID-19 SPEC COLLECT: HCPCS | Performed by: EMERGENCY MEDICINE

## 2020-08-07 PROCEDURE — 99285 EMERGENCY DEPT VISIT HI MDM: CPT

## 2020-08-07 PROCEDURE — 302970 POC BREATHALIZER: Performed by: EMERGENCY MEDICINE

## 2020-08-07 PROCEDURE — 302970 POC BREATHALIZER

## 2020-08-07 PROCEDURE — 96372 THER/PROPH/DIAG INJ SC/IM: CPT

## 2020-08-07 PROCEDURE — 700111 HCHG RX REV CODE 636 W/ 250 OVERRIDE (IP): Performed by: EMERGENCY MEDICINE

## 2020-08-07 RX ORDER — LORAZEPAM 2 MG/ML
INJECTION INTRAMUSCULAR
Status: DISCONTINUED
Start: 2020-08-07 | End: 2020-08-08 | Stop reason: HOSPADM

## 2020-08-07 RX ORDER — LORAZEPAM 2 MG/ML
0.5 INJECTION INTRAMUSCULAR ONCE
Status: COMPLETED | OUTPATIENT
Start: 2020-08-07 | End: 2020-08-07

## 2020-08-07 RX ORDER — HALOPERIDOL 5 MG/ML
5 INJECTION INTRAMUSCULAR ONCE
Status: COMPLETED | OUTPATIENT
Start: 2020-08-07 | End: 2020-08-07

## 2020-08-07 RX ORDER — HALOPERIDOL 5 MG/ML
5 INJECTION INTRAMUSCULAR ONCE
Status: DISCONTINUED | OUTPATIENT
Start: 2020-08-07 | End: 2020-08-07

## 2020-08-07 RX ORDER — HALOPERIDOL 5 MG/ML
INJECTION INTRAMUSCULAR
Status: DISCONTINUED
Start: 2020-08-07 | End: 2020-08-08 | Stop reason: HOSPADM

## 2020-08-07 RX ORDER — LORAZEPAM 2 MG/ML
0.5 INJECTION INTRAMUSCULAR ONCE
Status: DISCONTINUED | OUTPATIENT
Start: 2020-08-07 | End: 2020-08-07

## 2020-08-07 RX ORDER — LORAZEPAM 2 MG/ML
1 INJECTION INTRAMUSCULAR ONCE
Status: COMPLETED | OUTPATIENT
Start: 2020-08-07 | End: 2020-08-07

## 2020-08-07 RX ADMIN — HALOPERIDOL LACTATE 5 MG: 5 INJECTION, SOLUTION INTRAMUSCULAR at 19:16

## 2020-08-07 RX ADMIN — LORAZEPAM 0.5 MG: 2 INJECTION INTRAMUSCULAR; INTRAVENOUS at 20:00

## 2020-08-07 RX ADMIN — LORAZEPAM 1 MG: 2 INJECTION INTRAMUSCULAR; INTRAVENOUS at 19:17

## 2020-08-07 RX ADMIN — HALOPERIDOL LACTATE 5 MG: 5 INJECTION, SOLUTION INTRAMUSCULAR at 20:00

## 2020-08-07 ASSESSMENT — LIFESTYLE VARIABLES
TOTAL SCORE: 2
CONSUMPTION TOTAL: INCOMPLETE
HAVE YOU EVER FELT YOU SHOULD CUT DOWN ON YOUR DRINKING: NO
EVER FELT BAD OR GUILTY ABOUT YOUR DRINKING: YES
TOTAL SCORE: 2
HAVE PEOPLE ANNOYED YOU BY CRITICIZING YOUR DRINKING: YES
TOTAL SCORE: 2
EVER HAD A DRINK FIRST THING IN THE MORNING TO STEADY YOUR NERVES TO GET RID OF A HANGOVER: NO
DOES PATIENT WANT TO STOP DRINKING: NO
DO YOU DRINK ALCOHOL: YES

## 2020-08-07 ASSESSMENT — FIBROSIS 4 INDEX: FIB4 SCORE: 1.01

## 2020-08-08 VITALS
HEART RATE: 81 BPM | OXYGEN SATURATION: 96 % | DIASTOLIC BLOOD PRESSURE: 95 MMHG | RESPIRATION RATE: 16 BRPM | HEIGHT: 65 IN | TEMPERATURE: 98.1 F | BODY MASS INDEX: 32.84 KG/M2 | SYSTOLIC BLOOD PRESSURE: 142 MMHG | WEIGHT: 197.09 LBS

## 2020-08-08 LAB
COVID ORDER STATUS COVID19: NORMAL
POC BREATHALIZER: 0.09 PERCENT (ref 0–0.01)
SARS-COV-2 RNA RESP QL NAA+PROBE: NOTDETECTED
SPECIMEN SOURCE: NORMAL

## 2020-08-08 PROCEDURE — 90791 PSYCH DIAGNOSTIC EVALUATION: CPT

## 2020-08-08 PROCEDURE — U0003 INFECTIOUS AGENT DETECTION BY NUCLEIC ACID (DNA OR RNA); SEVERE ACUTE RESPIRATORY SYNDROME CORONAVIRUS 2 (SARS-COV-2) (CORONAVIRUS DISEASE [COVID-19]), AMPLIFIED PROBE TECHNIQUE, MAKING USE OF HIGH THROUGHPUT TECHNOLOGIES AS DESCRIBED BY CMS-2020-01-R: HCPCS

## 2020-08-08 NOTE — ED NOTES
Pt placed on 2L of O2 NC. desat to 85% on RA.  On NC pt sat 93%. Pt on pulse ox monitoring. 1:1 sitter at bedside.

## 2020-08-08 NOTE — ED PROVIDER NOTES
ED Provider Note    CHIEF COMPLAINT  Chief Complaint   Patient presents with   • Suicidal Ideation   • Alcohol Intoxication       HPI  Zainab Guy is a 60 y.o. female who presents with acute alcohol intoxication and suicidal ideation.  Patient is here with her son.  Her son states that she has been drinking heavily for the past week.  She is mostly desponded about losing a job.  Patient has wrestled with depression previously.  She is currently on an antidepressant.  She is not had episodes of suicidal ideation or attempts before.  Patient told her son that she would consider overdosing on pills and drinking herself to death.    REVIEW OF SYSTEMS  See HPI for further details.  No fever no chills.  No cough or cold symptoms.  No vomiting or diarrhea all other systems are negative.    PAST MEDICAL HISTORY  Past Medical History:   Diagnosis Date   • GERD (gastroesophageal reflux disease)    • HTN (hypertension)    • Hypercholesteremia    • Hypothyroidism    • Obesity    • Osteoarthritis        FAMILY HISTORY  Family History   Problem Relation Age of Onset   • Diabetes Mother    • Hypertension Mother    • Hypertension Father        SOCIAL HISTORY  Social History     Socioeconomic History   • Marital status: Single     Spouse name: Not on file   • Number of children: Not on file   • Years of education: Not on file   • Highest education level: Not on file   Occupational History   • Not on file   Social Needs   • Financial resource strain: Not on file   • Food insecurity     Worry: Not on file     Inability: Not on file   • Transportation needs     Medical: Not on file     Non-medical: Not on file   Tobacco Use   • Smoking status: Current Every Day Smoker     Packs/day: 0.50     Years: 25.00     Pack years: 12.50     Types: Cigarettes     Last attempt to quit: 10/2004     Years since quitting: 15.8   • Smokeless tobacco: Never Used   Substance and Sexual Activity   • Alcohol use: Yes     Comment: drank 1/2 bottle  "of jose   • Drug use: No   • Sexual activity: Not Currently     Birth control/protection: Post-Menopausal   Lifestyle   • Physical activity     Days per week: Not on file     Minutes per session: Not on file   • Stress: Not on file   Relationships   • Social connections     Talks on phone: Not on file     Gets together: Not on file     Attends Taoist service: Not on file     Active member of club or organization: Not on file     Attends meetings of clubs or organizations: Not on file     Relationship status: Not on file   • Intimate partner violence     Fear of current or ex partner: Not on file     Emotionally abused: Not on file     Physically abused: Not on file     Forced sexual activity: Not on file   Other Topics Concern   • Not on file   Social History Narrative   • Not on file       SURGICAL HISTORY  Past Surgical History:   Procedure Laterality Date   • ABDOMINAL HYSTERECTOMY TOTAL     • KNEE ORIF Bilateral        CURRENT MEDICATIONS  Home Medications    **Home medications have not yet been reviewed for this encounter**         ALLERGIES  No Known Allergies    PHYSICAL EXAM  VITAL SIGNS: /95   Pulse 96   Temp 36.7 °C (98.1 °F) (Temporal)   Resp 20   Ht 1.651 m (5' 5\")   Wt 89.4 kg (197 lb 1.5 oz)   SpO2 94%   BMI 32.80 kg/m²   Constitutional: Well developed, Well nourished, obese, smells of alcohol  HENT: Normocephalic, Atraumatic, Bilateral external ears normal, Oropharynx moist, No oral exudates, Nose normal.   Eyes: PERRLA, EOMI, Conjunctiva normal, scleral icterus  Neck: Normal range of motion, No tenderness,   Cardiovascular: Normal heart rate, Normal rhythm,.   Thorax & Lungs: Normal breath sounds, No respiratory distress,   Abdomen: Bowel sounds normal, Soft, No tenderness, No masses,   Skin: Warm, Dry, No erythema, No rash.   Extremities: No edema, No tenderness, No cyanosis, No clubbing. Dorsalis pedis pulses 2+ equal bilaterally. Radial pulses 2+ equal bilaterally  Neurologic: " Deep tendon reflexes strength sensation intact  Psychiatric: Tearful.  Flat affect.  Agitated        RADIOLOGY/PROCEDURES  Results for orders placed or performed during the hospital encounter of 08/07/20   POC BREATHALIZER   Result Value Ref Range    POC Breathalizer 0.224 (A) 0.00 - 0.01 Percent         COURSE & MEDICAL DECISION MAKING  Pertinent Labs & Imaging studies reviewed. (See chart for details)  Patient became markedly agitated and refused to stay.  She had to be sedated and restrained.  Patient will require a sober evaluation.  Patient be signed out to my partner at midnight for disposition        FINAL IMPRESSION  1.  Acute alcohol intoxication  2.  Depression with suicidal ideation  3.        Electronically signed by: Matthew Lowry M.D., 8/7/2020 7:14 PM

## 2020-08-08 NOTE — ED NOTES
Received report from [facility] Resendiz. Pt is currently sleeping in bed with even and unlabored breaths, in no apparent distress. Will continue to monitor. Sitter in view of pt.    ALERT Team attempted repeat breathalizer but pt was unable to cooperate. Will attempt again when pt is more awake.

## 2020-08-08 NOTE — ED PROVIDER NOTES
Received sign out from Dr. Lowry at 1200AM and assumed care of patient.    Briefly, this is a patient who presents with suicidal ideation in the setting of significant alcohol intoxication.  She did have recent stressors this week as she lost her job.      Dispo pending sober reevaluation and behavioral health evaluation    I discussed the patient's case with Elvis, behavioral health specialist.  He had a long discussion with the patient once she is sober who states that she is no longer having suicidal ideation.  He does not believe she meets criteria for legal hold at this time and I agree.  He has established a safe plan for discharge and provided her resources.    On reassessment, the patient is resting comfortably with normal vital signs.  She tells me again that she is not feeling suicidal although she does appear to be depressed.  She understands importance of outpatient follow-up and return precautions for changing or worsening symptoms.    Impression:   1. Alcoholic intoxication without complication (HCC)    2. Depression, unspecified depression type        Disposition: Discharge home, stable condition  Results, diagnoses, and treatment options were discussed with the patient and/or family. Patient verbalized understanding of plan of care and strict return precautions prior to discharge.

## 2020-08-08 NOTE — ED TRIAGE NOTES
Ambulatory to triage with son with   Chief Complaint   Patient presents with   • Suicidal Ideation   • Alcohol Intoxication   Per son's report, pt has become increasingly depressed over the last three days and has had multiple triggers including loosing her job. Pt drank 1/2 of a bottle of schnapps. High risk. States hx of overdosing on pills and plan would be to overdose again. Pt is hysterical and crying. Son is anxious. Pt trying to leave. Escorted to room by assist RN.

## 2020-08-08 NOTE — ED NOTES
Pt becoming combative. Security called and at bedside.  Dr. Swanson called and made aware. Medications ordered and given. Pt son at bedside. Pt son asked to leave. Information for son was confirmed. 1:1 sitter at bedside.

## 2020-08-08 NOTE — ED NOTES
At bedside with Elvis from the ALERT Team and pt was able to do breathalizer now. Since she was 0.092 Elvis states he will be back to recheck and hopefully assess pt in about an hour. Also obtained COVID swab on pt and sent to lab while she was awake. Pt now back to sleep with even and unlabored breaths. Sitter in view of pt. Will continue to monitor pt.

## 2020-08-08 NOTE — ED NOTES
To room green 32. Tearful and repeating statements that she wishes to leave.  Son at bedside and is helpful with deescalation and calming.  Explained plan of care and provided for comfort. In gown, refused blankets. One bag of belongings (clothes, slippers and glasses) security called for search. Son took possession of wallet and smart watch.  POC breathalyzer completed. Chart up for ERP.

## 2020-08-08 NOTE — CONSULTS
[facility]   TRIAGE ASSESSMENT    Name: Zainab Guy  MRN: [id number]  : 1960  Age: 60 y.o.  Date of assessment: 2020  PCP: FREDIS Rose  Persons in attendance: Patient    CHIEF COMPLAINT/PRESENTING ISSUE (as stated by Zainab Guy   ): The patient presents as a 60 year-old female, ambulating to triage with her son after becoming intoxicated and making suicidal statements at home. Upon sobering the patient relates that she has been experiencing increased stress due to the recent loss of her job, among other stressors. The patient adamantly denies SI/HI; she is future/forward thinking and expressed no intent or plan to harm self. She does admit to making suicidal statements while drinking, but expresses regret over those words with both good insight and judgment. She is alert, oriented, and cooperative, answering all questions in a friendly manner. She denies AH/VH and her thought content is appropriate to the context of the situation. She reports that she takes Citalopram as prescribed for depression, noting it works well. The patient presents with no safety concerns and will be able to safely discharge to home as she does not meet the requirements for a legal hold; this writer spent time with the patient discussing and providing the patient with local counseling resources which the patient can utilize, which she accepted and noted she will look into upon discharge.    Chief Complaint   Patient presents with   • Suicidal Ideation   • Alcohol Intoxication        CURRENT LIVING SITUATION/SOCIAL SUPPORT: The patient lives at home with her grown son in a shared residence; the patient notes he is a supportive figure in her life. She was working at [employer] before being fired in the past week; presently she reports a moderate support network consisting of both family and friends.     BEHAVIORAL HEALTH TREATMENT HISTORY  Does patient/parent report a history of prior behavioral  "health treatment for patient?   Yes:    Dates Level of Care Facilty/Provider Diagnosis/Problem Medications   Current OP Dr. Vang Major Depressive DO Citalopram       SAFETY ASSESSMENT - SELF  Does patient acknowledge current or past symptoms of dangerousness to self? yes  Does parent/significant other report patient has current or past symptoms of dangerousness to self? N\A  Does presenting problem suggest symptoms of dangerousness to self? No    SAFETY ASSESSMENT - OTHERS  Does patient acknowledge current or past symptoms of aggressive behavior or risk to others? no  Does parent/significant other report patient has current or past symptoms of aggressive behavior or risk to others?  N\A  Does presenting problem suggest symptoms of dangerousness to others? No    Crisis Safety Plan completed and copy given to patient? no    ABUSE/NEGLECT SCREENING  Does patient report feeling “unsafe” in his/her home, or afraid of anyone?  no  Does patient report any history of physical, sexual, or emotional abuse?  no  Does parent or significant other report any of the above? N\A  Is there evidence of neglect by self?  no  Is there evidence of neglect by a caregiver? no  Does the patient/parent report any history of CPS/APS/police involvement related to suspected abuse/neglect or domestic violence? no  Based on the information provided during the current assessment, is a mandated report of suspected abuse/neglect being made?  No    SUBSTANCE USE SCREENING  Yes:  Robert all substances used in the past 30 days:      Last Use Amount   [x]   Alcohol 8/7/2020 1/2 bottle of schnapps   []   Marijuana     []   Heroin     []   Prescription Opioids  (used without prescription, for    recreation, or in excess of prescribed amount)     []   Other Prescription  (used without prescription, for    recreation, or in excess of prescribed amount)     []   Cocaine      []   Methamphetamine     []   \"\" drugs (ectasy, MDMA)     []   Other " substances        UDS results: Pending  Breathalyzer results: 0.048    What consequences does the patient associate with any of the above substance use and or addictive behaviors? Work problems or losses, Health problems    Risk factors for detox (check all that apply):  []  Seizures   []  Diaphoretic (sweating)   []  Tremors   []  Hallucinations   []  Increased blood pressure   []  Decreased blood pressure   []  Other   []  None      [] Patient education on risk factors for detoxification and instructed to return to ER as needed.      MENTAL STATUS   Participation: Active verbal participation, Attentive and Engaged  Grooming: Casual  Orientation: Alert and Fully Oriented  Behavior: Calm  Eye contact: Good  Mood: Euthymic  Affect: Full range and Congruent with content  Thought process: Logical and Goal-directed  Thought content: Within normal limits  Speech: Rate within normal limits and Volume within normal limits  Perception: Within normal limits  Memory:  No gross evidence of memory deficits  Insight: Adequate  Judgment:  Adequate  Other:    Collateral information:   Source:  [] Significant other present in person:   [] Significant other by telephone  [] [facility]   [x] [facility] Nursing Staff  [x] [facility]  [] Other:        CLINICAL IMPRESSIONS:  Primary: Major Depressive DO  Secondary:        IDENTIFIED NEEDS/PLAN:  [Trigger DISPOSITION list for any items marked]    []  Imminent safety risk - self [] Imminent safety risk - others   []  Acute substance withdrawal []  Psychosis/Impaired reality testing   [x]  Mood/anxiety [x]  Substance use/Addictive behavior   []  Maladaptive behaviro []  Parent/child conflict   []  Family/Couples conflict []  Biomedical   []  Housing []  Financial   []   Legal  Occupational/Educational   []  Domestic violence []  Other:     Disposition: Consulted with ERP; at this time the patient presents with no safety concerns and will be safe to discharge home where they  will follow up with their current provider, in addition to using the resources provided to see a mental health provider/therapist; patient is alert, oriented, and has adequate insight/judgment. Patient agreeable to plan.     Does patient express agreement with the above plan? yes    Referral appointment(s) scheduled? N\A    Alert team only:   I have discussed findings and recommendations with Dr. Ferris who is in agreement with these recommendations.       JAMES Chávez.  8/8/2020

## 2020-08-08 NOTE — ED NOTES
Pt sleeping in bed with even and unlabored breaths. No distress is noted at this time. Getting a cab voucher for pt and will d/c her.

## 2020-08-08 NOTE — ED NOTES
Restraints removed per security.  Pt tolerating well. No signs of injury. Pt asleep but easily wakes up to voice but falls asleep easily. Side rails up x 2. Respirations even and unlabored. 1:1 sitter at bedside.

## 2020-08-08 NOTE — ED NOTES
Right ankle and left wrist restraint removed per security. Pt appears sedated. Opens eyes to touch but falls asleep easily. Pt on pulse ox monitoring. Side rails up x 2. Respirations even and unlabored.

## 2020-08-08 NOTE — ED NOTES
Pt becoming combative again. Yelling and hitting at staff. Security at bedside. Stating wanting to go home with son. Explained to pt and redirection attempted. Repeat medication ordered and given. Pt continuing to fight with staff. Pt placed in four point hard restraints. 1:1 sitter at pt bedside.

## 2020-08-08 NOTE — ED NOTES
Pt laying in bed, sleeping. no signs of acute distress. respirations even and unlabored. call light within reach. Side rails up x 2. 1:1 sitter at bedside.

## 2020-08-08 NOTE — DISCHARGE PLANNING
Alert Team    Alert Team aware of pending mental health evaluation; at this time the patient is intoxicated, their breathalyzer reading was 0.224 at 6:49 PM. Alert Team to continue to monitor and will evaluate patient ASAP once she is legally sober and able to participate in a mental health assessment.

## 2020-09-27 ENCOUNTER — APPOINTMENT (OUTPATIENT)
Dept: RADIOLOGY | Facility: MEDICAL CENTER | Age: 60
End: 2020-09-27
Attending: EMERGENCY MEDICINE
Payer: COMMERCIAL

## 2020-09-27 ENCOUNTER — HOSPITAL ENCOUNTER (EMERGENCY)
Facility: MEDICAL CENTER | Age: 60
End: 2020-09-28
Attending: EMERGENCY MEDICINE | Admitting: EMERGENCY MEDICINE
Payer: COMMERCIAL

## 2020-09-27 DIAGNOSIS — S09.93XA FACIAL INJURY, INITIAL ENCOUNTER: ICD-10-CM

## 2020-09-27 DIAGNOSIS — R45.851 SUICIDAL IDEATION: ICD-10-CM

## 2020-09-27 DIAGNOSIS — S02.2XXA CLOSED FRACTURE OF NASAL BONE, INITIAL ENCOUNTER: ICD-10-CM

## 2020-09-27 DIAGNOSIS — F10.929 ALCOHOLIC INTOXICATION WITH COMPLICATION (HCC): ICD-10-CM

## 2020-09-27 LAB — GLUCOSE BLD-MCNC: 78 MG/DL (ref 65–99)

## 2020-09-27 PROCEDURE — 700111 HCHG RX REV CODE 636 W/ 250 OVERRIDE (IP): Performed by: EMERGENCY MEDICINE

## 2020-09-27 PROCEDURE — 70486 CT MAXILLOFACIAL W/O DYE: CPT

## 2020-09-27 PROCEDURE — 96372 THER/PROPH/DIAG INJ SC/IM: CPT

## 2020-09-27 PROCEDURE — 99285 EMERGENCY DEPT VISIT HI MDM: CPT

## 2020-09-27 PROCEDURE — 302970 POC BREATHALIZER: Performed by: EMERGENCY MEDICINE

## 2020-09-27 PROCEDURE — 70450 CT HEAD/BRAIN W/O DYE: CPT

## 2020-09-27 PROCEDURE — 82962 GLUCOSE BLOOD TEST: CPT

## 2020-09-27 RX ORDER — LORAZEPAM 2 MG/ML
1 INJECTION INTRAMUSCULAR ONCE
Status: COMPLETED | OUTPATIENT
Start: 2020-09-27 | End: 2020-09-27

## 2020-09-27 RX ORDER — DIPHENHYDRAMINE HYDROCHLORIDE 50 MG/ML
25 INJECTION INTRAMUSCULAR; INTRAVENOUS ONCE
Status: COMPLETED | OUTPATIENT
Start: 2020-09-27 | End: 2020-09-27

## 2020-09-27 RX ORDER — HALOPERIDOL 5 MG/ML
5 INJECTION INTRAMUSCULAR ONCE
Status: COMPLETED | OUTPATIENT
Start: 2020-09-27 | End: 2020-09-27

## 2020-09-27 RX ADMIN — HALOPERIDOL LACTATE 5 MG: 5 INJECTION, SOLUTION INTRAMUSCULAR at 17:17

## 2020-09-27 RX ADMIN — DIPHENHYDRAMINE HYDROCHLORIDE 25 MG: 50 INJECTION INTRAMUSCULAR; INTRAVENOUS at 17:17

## 2020-09-27 RX ADMIN — LORAZEPAM 1 MG: 2 INJECTION INTRAMUSCULAR; INTRAVENOUS at 17:17

## 2020-09-27 ASSESSMENT — FIBROSIS 4 INDEX: FIB4 SCORE: 1.01

## 2020-09-27 ASSESSMENT — LIFESTYLE VARIABLES: DO YOU DRINK ALCOHOL: YES

## 2020-09-27 NOTE — ED TRIAGE NOTES
"Zainab Guy  Chief Complaint   Patient presents with   • Alcohol Intoxication   • Suicidal Ideation     placed on legal 2000 by [employer] for expressed SI and cutting self with glass shards.      Pt biba and [employer] who placed pt on legal hold for SI and attempt.  Intoxicated with ETOH.  Per EMS son called because he received a text message from her saying \"goodbye\",  When PD and EMS arrived pt was intoxicated and house had been ransacked.  Pt received 1mg IM versed PTA  Pt also has a swollen and bruised (R) eye.  Small laceration noted to palm of hand.  FSBS 99  Pt in four points restraints by EMS and resumed here as pt is thrashing around and verbally abusive to staff, calling this RN \"a bitch\" and \"a cunt\".    Pt on monitor. And chart up for ERP    Late entry- per EMS son believed that pt would be open to help once she sobered up  "

## 2020-09-28 VITALS
WEIGHT: 197.09 LBS | RESPIRATION RATE: 16 BRPM | TEMPERATURE: 99 F | BODY MASS INDEX: 32.84 KG/M2 | HEIGHT: 65 IN | OXYGEN SATURATION: 99 % | HEART RATE: 99 BPM | DIASTOLIC BLOOD PRESSURE: 120 MMHG | SYSTOLIC BLOOD PRESSURE: 182 MMHG

## 2020-09-28 LAB
AMPHET UR QL SCN: NEGATIVE
BARBITURATES UR QL SCN: NEGATIVE
BENZODIAZ UR QL SCN: POSITIVE
BZE UR QL SCN: NEGATIVE
CANNABINOIDS UR QL SCN: POSITIVE
METHADONE UR QL SCN: NEGATIVE
OPIATES UR QL SCN: NEGATIVE
OXYCODONE UR QL SCN: NEGATIVE
PCP UR QL SCN: NEGATIVE
POC BREATHALIZER: 0.07 PERCENT (ref 0–0.01)
POC BREATHALIZER: 0.13 PERCENT (ref 0–0.01)
PROPOXYPH UR QL SCN: NEGATIVE

## 2020-09-28 PROCEDURE — 80307 DRUG TEST PRSMV CHEM ANLYZR: CPT

## 2020-09-28 PROCEDURE — 302970 POC BREATHALIZER: Performed by: EMERGENCY MEDICINE

## 2020-09-28 PROCEDURE — 90791 PSYCH DIAGNOSTIC EVALUATION: CPT

## 2020-09-28 RX ORDER — LEVOTHYROXINE SODIUM 0.05 MG/1
50 TABLET ORAL
COMMUNITY
End: 2020-11-09

## 2020-09-28 RX ORDER — CHLORAL HYDRATE 500 MG
1000 CAPSULE ORAL DAILY
Status: SHIPPED | COMMUNITY
End: 2023-10-01

## 2020-09-28 RX ORDER — CITALOPRAM 20 MG/1
20 TABLET ORAL DAILY
COMMUNITY
End: 2021-03-01

## 2020-09-28 RX ORDER — BENAZEPRIL HYDROCHLORIDE 20 MG/1
20 TABLET ORAL DAILY
COMMUNITY
End: 2020-11-09

## 2020-09-28 RX ORDER — HYDROCHLOROTHIAZIDE 25 MG/1
25 TABLET ORAL DAILY
COMMUNITY
End: 2020-11-09

## 2020-09-28 RX ORDER — ATORVASTATIN CALCIUM 80 MG/1
80 TABLET, FILM COATED ORAL DAILY
COMMUNITY
End: 2020-11-09

## 2020-09-28 NOTE — DISCHARGE PLANNING
Ava from [facility] called and stated they would accept Pt.   Dr. Ambrosio will be admitting physician.  They request a 1130 transfer time.     SW will arrange transportation and update RN.

## 2020-09-28 NOTE — ED PROVIDER NOTES
ED Provider Note    4:18 AM Patient reevaluated.  Patient is on a legal hold, waiting transfer to psychiatric facility when a bed is available.  Please refer to initial note for complete details.  Patient is clinically and legally sober also remains uncooperative.  She has been seen and evaluated by life skills, but was not very conversive.  Denies SI but had another attempt, has not followed up in outpatient setting since previous SI with EtOH.  She cannot contract to safety and her son remains concerned about her wellbeing.  She will await psychiatry evaluation later today.  Otherwise she is ambulatory independently.  No acute distress.

## 2020-09-28 NOTE — ED NOTES
Pt sleeping comfortably on gurney, no needs at this time. Pt provided PO water. 1:1 sitter in direct view of patient.

## 2020-09-28 NOTE — ED NOTES
Pt resting comfortably, appears in no acute distress. Sitter in place. VSS. Awaiting transport to [place]. Pt refused breakfast tray.

## 2020-09-28 NOTE — ED NOTES
Assumed care of patient, pt on monitor, pt sleeping easily arousable. Pt on LH 2000, 1:1 sitter placed.     All personal items removed. Pt in hospital clothing only. Removed all sharps and potentially dangerous items from room. No visitors. Curtains open at all times. Discussed suicide precautions with patient and they are able to verbalize understanding.

## 2020-09-28 NOTE — ED NOTES
"ERP at bedside. Medicated per orders  Pt screaming \"fuck you, I want to go home\" while trying to flip gurney, miley moved against wall.    "

## 2020-09-28 NOTE — ED NOTES
Med rec updated and complete  Allergies reviewed  Pt reports that she has not picked up her ALBUTEROL inhaler, but has took all her other medications yesterday (9/27/2020) @ 1000.  Pt reports no antibiotics in the last 2 weeks

## 2020-09-28 NOTE — ED NOTES
Pt resting comfortably, appears in no acute distress. Sitter in place. VSS. Awaiting transfer to [facility]. Pt updating family on phone regarding her POC

## 2020-09-28 NOTE — ED NOTES
Pt sleeping on gurney, pt is still very drowsy to follow directions. No needs at this time. 1:1 sitter at bedside.

## 2020-09-28 NOTE — ED NOTES
Pt complaining about temperature level in room 32, unable to adjust, moved to room 30 which had a more temperate setting. 1:1 sitter continues.

## 2020-09-28 NOTE — ED NOTES
Pt resting comfortably, appears in no acute distress. Sitter in place. VSS. Awaiting dispo plan per alert team.

## 2020-09-28 NOTE — DISCHARGE PLANNING
Medical Social Work    MSW received copy of pt's legal hold from Alert Team.  Unable to send mental health referrals as pt has not been registered.  Per Alert Team pt reports that she has no health insurance.  Pt has also not urinated for UDS at this time.  Pt's referral will need to be sent to [facility] once registered and UDS is available.

## 2020-09-28 NOTE — ED NOTES
Pt assisted to the bathroom, steady gait, RN provided pt a UA specimen and educated pt about UA, pt refusing to provide at this time. Provided pt another cup of water.

## 2020-09-28 NOTE — ED NOTES
Pt now laying on the floor, refusing to get up. Education provided. No learning evidence. 1:1 sitter

## 2020-09-28 NOTE — DISCHARGE PLANNING
Medical Social Work    Referral: Legal Hold    Intervention: Legal Hold Paperwork given to SW by Life Skills RN Cesia Louie    Legal Hold Initiated: Date: 07-27-20 Time: 1600    Patient’s Insurance Listed on Face Sheet: [facility]    Referrals sent to: [place] , [facility]    This referral contains the following information:  1) Face sheet __x__  2) Page 1 and Page 2 of Legal Hold _x___  3) Alert Team Assessment/Psych Assessment __x__  4) Head to toe physical exam _x___  5) Urine Drug Screen __Pending__  6) Blood Alcohol __x__  7) Vital signs _x___  8) Pregnancy test when applicable _NA__  9) Medications list __x__    Plan: Patient will transfer to mental health facility once acceptance is obtained

## 2020-09-28 NOTE — DISCHARGE PLANNING
Alert Team  Contacted bedside RN via Voalte with request to have PAR complete pt's registration STAT.  Per notes, pt slated for transfer to , [facility].  Per pt, her insurance is .  IF that is the case, she should not be transferred to , but should wait for bed at [facility].    Contacted SW and PAR with these concerns; await replies from all.    [facility] for registration completion by PAR.

## 2020-09-28 NOTE — ED PROVIDER NOTES
"ED Provider Note    CHIEF COMPLAINT  Chief Complaint   Patient presents with   • Alcohol Intoxication   • Suicidal Ideation     placed on legal 2000 by [employer] for expressed SI and cutting self with glass shards.        HPI  Zainab Guy is a 60 y.o. female who presents via EMS after [employer] placed her on a legal hold for SI.  Patient was found intoxicated.  Patient's son called EMS after receiving a text message saying \"goodbye.\"    Patient was given Versed 1 mg IM without effect.      ALLERGIES  No Known Allergies    CURRENT MEDICATIONS  Unknown    PAST MEDICAL HISTORY   has a past medical history of GERD (gastroesophageal reflux disease), HTN (hypertension), Hypercholesteremia, Hypothyroidism, Obesity, and Osteoarthritis.    SURGICAL HISTORY   has a past surgical history that includes knee orif (Bilateral) and abdominal hysterectomy total.    SOCIAL HISTORY  Social History     Tobacco Use   • Smoking status: Current Every Day Smoker     Packs/day: 0.50     Years: 25.00     Pack years: 12.50     Types: Cigarettes     Last attempt to quit: 10/2004     Years since quittin.0   • Smokeless tobacco: Never Used   Substance and Sexual Activity   • Alcohol use: Yes     Comment: drank 1/2 bottle of schnapps   • Drug use: No   • Sexual activity: Not Currently     Birth control/protection: Post-Menopausal       Family Hx:  Unobtainable given patient's altered mental state    REVIEW OF SYSTEMS  See HPI for further details.  Unobtainable given patient's altered mental state    PHYSICAL EXAM  VITAL SIGNS: BP (!) 163/72   Pulse 93   Temp 37.1 °C (98.8 °F) (Temporal)   Resp 20   Ht 1.651 m (5' 5\")   Wt 89.4 kg (197 lb 1.5 oz)   SpO2 92%   BMI 32.80 kg/m²    General:  WD overweight, alert, smells of alcohol, yelling, verbally abusive, attempting to get out of her four-point restraints; V/S as above   Skin: warm and dry; good color; no rash  HEENT: NC; right periorbital ecchymosis; EOMs intact; no scleral icterus "   Neck: FROM; soft, supple  Cardiovascular: Regular heart rate and rhythm.  No murmurs, rubs, or gallops; pulses 2+ bilaterally radially and DP areas  Lungs: Clear to auscultation with good air movement bilaterally.  No wheezes, rhonchi, or rales.   Abdomen: BS present; soft; NTND; no rebound, guarding, or rigidity.  No organomegaly or pulsatile mass  Extremities: SANTIAGO x 4; superficial abrasions over the right wrist area; no pedal edema  Neurologic: CNs III-XII grossly intact; speech clear; distal sensation intact; strength 5/5 UE/LEs  Psychiatric: Appropriate affect, agitated    LABS  pending    IMAGING  CT-HEAD W/O    (Results Pending)   CT-MAXILLOFACIAL W/O PLUS RECONS    (Results Pending)     MEDICAL RECORD  I have reviewed patient's medical record and pertinent results are listed below.      COURSE & MEDICAL DECISION MAKING  I have reviewed any medical record information, laboratory studies and radiographic results as noted.    Zainab Guy is a 60 y.o. female who presents agitated, with reports of SI, smelling of alcohol, with abrasions in the right upper extremity and old appearing ecchymosis surrounding the right eye.    Appropriate PPE was worn at all times while interacting with the patient, including goggles, N95 mask, and surgical mask.    Patient was in four-point restraints when I first evaluated her.  She is verbally abusive and attempting to get out of the restraints.    Patient was medicated with Benadryl 25 mg, Ativan 1 mg, and Haldol 5 mg IM.    We will obtain a CT head and max face.    Patient will be signed out to the oncoming ERP for reevaluation of SI/HI, CT results, and sobriety.      FINAL IMPRESSION  1. Suicidal ideation     2. Alcoholic intoxication with complication (HCC)     3. Facial injury, initial encounter       Electronically signed by: Faye Lezama M.D., 9/27/2020 5:04 PM

## 2020-09-28 NOTE — ED PROVIDER NOTES
"ED Provider Note    Progress note:    This patient was signed out to me by the prior provider.  Patient was placed on a legal hold upon arrival due to concerns for suicidal ideation.  Please refer to prior documentation for more complete assessment of this patient and her stay here.    Patient has obvious facial injury and multiple bruises on her body.  I was asked to follow-up on CT examination of her head and face.    CT-HEAD W/O   Final Result         1.  No acute intracranial abnormality.   2.  Right facial contusion      CT-MAXILLOFACIAL W/O PLUS RECONS   Final Result         1.  Mildly comminuted bilateral nasal bone fractures   2.  Right premaxillary soft tissue contusion          The patient was evaluated at the bedside.  No evidence of septal hematoma.  She is more alert currently.  Breathalyzer was performed and she is still too intoxicated for life skills evaluation.  She does not recall how she got all of these bruises on her body and she does not recall known trauma to the face.  She denies anyone in her life who is trying to harm her.  She notes suicidal ideation earlier.  Currently, the patient states that she simply wants to go home.    We will await further sobriety prior to life skills evaluation to determine final safety disposition.    /59   Pulse 98   Temp 37.1 °C (98.8 °F) (Temporal)   Resp 19   Ht 1.651 m (5' 5\")   Wt 89.4 kg (197 lb 1.5 oz)   SpO2 96%   BMI 32.80 kg/m²      Donald Burris, A.P.N.  [address]  [address]  [facility] [address]  [phone]          [facility], Emergency Dept  [address] [facility] [id number] [phone]        Kenan Allen M.D.  [address]  [facility] [address]  [phone]    Schedule an appointment as soon as possible for a visit         1. Suicidal ideation    2. Alcoholic intoxication with complication (HCC)    3. Facial injury, initial encounter    4. Closed fracture of nasal bone, initial encounter       "

## 2020-09-28 NOTE — ED NOTES
Pt calmer, c/o pain to (R) arm. Security called to assist with repositioning and restraints decreased to 2.

## 2020-09-28 NOTE — ED NOTES
Pt's son Pako called, Updated son on plan of care, Pt's son verbalized understanding. Son stating that pt is not safe to go home at this time, due to her SI thoughts. Son apologizing for pt's behavior, stating she does not usually act like this.

## 2020-09-28 NOTE — ED NOTES
Pt sleeping on gurney, chest rise and fall. Pt able to turn herself on gurney. 1:1 sitter at bedside.

## 2020-09-28 NOTE — DISCHARGE PLANNING
PCS completed and faxed to [facility]  Transportation time arranged for 1130 with [place] at [facility]    Transfer Packet completed with Original Legal Hold placed inside.   RN updated on transfer and transfer time.     Ava at [facility] aware of 1130 [facility] transportation time.

## 2020-09-28 NOTE — DISCHARGE PLANNING
Alert Team  PAR completed pt's registration and confirmed pt's insurance still active and pt can transfer as scheduled.

## 2020-09-28 NOTE — ED NOTES
"Pt woke up, threw all the leads and monitor off becoming aggressive. Pt looking for belonging stating that she is leaving. RN educated pt, that pt is on a legal hold, pt refusing education, stating \"I don't give a fuck, what are you going to do arrest me! I'm going home!\" Security called. Pt sitting on the floor, refusing to get up. Pt refusing to get back on monitor.   "

## 2020-09-28 NOTE — CONSULTS
"[facility]   TRIAGE ASSESSMENT    Name: Zainab Guy  MRN: [id number]  : 1960  Age: 60 y.o.  Date of assessment: 2020  PCP: MAURO Rose.  Persons in attendance: Patient    CHIEF COMPLAINT/PRESENTING ISSUE (as stated by Patient, ER RN, ERP):   Patient is a 59 y/o female BIB EMS intoxicated and in restraints, placed on a legal hold by [employer] after suicidal attempt with pain medication and alcohol as well as self harming with broken glass and hitting her face with a guitar. Patient also vocalized to authorities her intent to continue to attempt suicide and recent SA x 1 month ago which was documented on legal hold. Collateral information from patient's son, obtained by bedside RN, reports his concern for patient's safety if she were to return home. Patient is now clinically sober, denies SI but states, \"I've got nothing to live for.\" EMR documents previous ETOH/SI last month, discharging home with therapy resources but patient reports not following up to address depression due to lack of insurance coverage. Patient declining to answer any further evaluation questions. Patient will remain on legal hold for further psychiatric evaluation and stabilization.    Chief Complaint   Patient presents with   • Alcohol Intoxication   • Suicidal Ideation     placed on legal 2000 by [employer] for expressed SI and cutting self with glass shards.         CURRENT LIVING SITUATION/SOCIAL SUPPORT: EMR documents patient lives at home with her grown son in a shared residence; the patient notes he is a supportive figure in her life. Patient fired in end of July from [employer]; No current insurance.     BEHAVIORAL HEALTH TREATMENT HISTORY  Does patient/parent report a history of prior behavioral health treatment for patient?   Dates Level of Care Facilty/Provider Diagnosis/Problem Medications   Current OP Dr. Vang, PCP Major Depressive DO Citalopram     SAFETY ASSESSMENT - SELF  Does patient acknowledge current " or past symptoms of dangerousness to self? yes  Does parent/significant other report patient has current or past symptoms of dangerousness to self? Yes; adult son concerned about patient safety if she returns home.   Does presenting problem suggest symptoms of dangerousness to self? Yes:     Past Current    Suicidal Thoughts: [x]  []    Suicidal Plans: []  []    Suicidal Intent: []  []    Suicide Attempts: [x]  []    Self-Injury []  []      For any boxes checked above, provide detail: EMR documents SI while intoxicated last month while denying SA hx. L2K initiated by [employer] reports patient had reported previous SA last month, cutting self with broken glass and intentionally ingesting pain meds and alcohol prior to arrival.       History of suicide by family member: Declining to answer evaluation questions.   History of suicide by friend/significant other: Declining to answer evaluation questions.   Recent change in frequency/specificity/intensity of suicidal thoughts or self-harm behavior? Declining to answer evaluation questions.   Current access to firearms, medications, or other identified means of suicide/self-harm? No   If yes, willing to restrict access to means of suicide/self-harm? yes - L2K, belongings secure and awaiting transfer to psychiatric facility.  Protective factors present:  Pt declining to answer evaulation questions.     SAFETY ASSESSMENT - OTHERS  Does patient acknowledge current or past symptoms of aggressive behavior or risk to others? Declining to answer evaluation questions.   Does parent/significant other report patient has current or past symptoms of aggressive behavior or risk to others?  Son reports patient is not physically and verbally aggressive when not intoxicated.   Does presenting problem suggest symptoms of dangerousness to others? .Patient physically and verbally aggressive with staff; pt required four point restraints and IM medications intervention.     Crisis Safety Plan  "completed and copy given to patient? no    ABUSE/NEGLECT SCREENING  Does patient report feeling “unsafe” in his/her home, or afraid of anyone?  Declining to answer evaluation questions.   Does patient report any history of physical, sexual, or emotional abuse? EMR documents no abuse hx.   Does parent or significant other report any of the above? no  Is there evidence of neglect by self?  no  Is there evidence of neglect by a caregiver? no  Does the patient/parent report any history of CPS/APS/police involvement related to suspected abuse/neglect or domestic violence? no  Based on the information provided during the current assessment, is a mandated report of suspected abuse/neglect being made?  No    SUBSTANCE USE SCREENING  Yes:  Robert all substances used in the past 30 days:      Last Use Amount   [x]   Alcohol 9/27/2020    []   Marijuana     []   Heroin     []   Prescription Opioids  (used without prescription, for    recreation, or in excess of prescribed amount)     []   Other Prescription  (used without prescription, for    recreation, or in excess of prescribed amount)     []   Cocaine      []   Methamphetamine     []   \"\" drugs (ectasy, MDMA)     []   Other substances        UDS results: Patient declining to provide.   Breathalyzer results: 0.128, 0.072    What consequences does the patient associate with any of the above substance use and or addictive behaviors? Work problems or losses, Family problems    Risk factors for detox (check all that apply):  []  Seizures   []  Diaphoretic (sweating)   []  Tremors   []  Hallucinations   []  Increased blood pressure   []  Decreased blood pressure   [x]  Other; declining to answer   []  None      [x] Patient education on risk factors for detoxification and instructed to return to ER as needed.      MENTAL STATUS    Participation: No verbal participation, Guarded and Resistant  Grooming: Disheveled  Orientation: Patient declining to answer orientation " questions.  Behavior: Agitated  Eye contact: Poor  Mood: Depressed  Affect: Angry  Thought process: Patient declining to answer evaluation questions to determine thought process.  Thought content: Patient declining to answer evaluation questions to determine thought content.  Speech: Hypotalkative  Perception: Within normal limits  Memory:  Poor memory for chronology of events  Insight: Poor  Judgment:  Poor  Other:    Collateral information:   Source:  [] Significant other present in person:   [x] Patient's adult son by telephone to bedside RN  [] [facility]   [x] [facility] Nursing Staff  [x] [facility] Medical Record  [x] Other:     [] Unable to complete full assessment due to:  [] Acute intoxication  [x] Patient declined to participate/engage  [] Patient verbally unresponsive  [] Significant cognitive deficits  [] Significant perceptual distortions or behavioral disorganization  [] Other:      CLINICAL IMPRESSIONS:  Primary: Suicidal Attempt  Secondary:  Depression, Alcohol Use       IDENTIFIED NEEDS/PLAN:  [Trigger DISPOSITION list for any items marked]    [x]  Imminent safety risk - self [] Imminent safety risk - others   []  Acute substance withdrawal []  Psychosis/Impaired reality testing   [x]  Mood/anxiety [x]  Substance use/Addictive behavior   [x]  Maladaptive behaviro []  Parent/child conflict   []  Family/Couples conflict []  Biomedical   []  Housing [x]  Financial   []   Legal  Occupational/Educational   []  Domestic violence []  Other:     Disposition: Actively being addressed by [facility] and [facility]    Does patient express agreement with the above plan? No; patient declining evaluation questions only requesting to return home. Son reports concern for patient's safety I she were to discharge home without further psychiatric evaluation.     Referral appointment(s) scheduled? N\A    Alert team only: Patient attempted to harm herself multiple times while intoxicated.  Patient is now clinically sober, declining to engage in evaluation and is unable to vocalize safe discharge. Patient to remain on legal hold for further psychiatric evaluation and stabilization.   I have discussed findings and recommendations with Dr. Guy who is in agreement with these recommendations.     Referral information sent to the following community providers : [facility]; patient has no active Insurance. Will e-mail PAR to assist with Medicaid    If applicable : Referred  to : Radha for legal hold follow up at [phone]      Cesia Louie R.N.  9/28/2020

## 2021-02-02 ENCOUNTER — HOSPITAL ENCOUNTER (EMERGENCY)
Facility: MEDICAL CENTER | Age: 61
End: 2021-02-02
Payer: COMMERCIAL

## 2021-02-02 VITALS
HEIGHT: 65 IN | RESPIRATION RATE: 18 BRPM | BODY MASS INDEX: 32.8 KG/M2 | SYSTOLIC BLOOD PRESSURE: 176 MMHG | DIASTOLIC BLOOD PRESSURE: 111 MMHG | OXYGEN SATURATION: 91 % | TEMPERATURE: 96.7 F | HEART RATE: 133 BPM

## 2021-02-02 PROCEDURE — 302449 STATCHG TRIAGE ONLY (STATISTIC)

## 2021-03-01 ENCOUNTER — HOSPITAL ENCOUNTER (EMERGENCY)
Facility: MEDICAL CENTER | Age: 61
End: 2021-03-01
Attending: EMERGENCY MEDICINE

## 2021-03-01 VITALS
HEART RATE: 97 BPM | HEIGHT: 65 IN | DIASTOLIC BLOOD PRESSURE: 98 MMHG | TEMPERATURE: 98.2 F | RESPIRATION RATE: 17 BRPM | BODY MASS INDEX: 31.65 KG/M2 | OXYGEN SATURATION: 94 % | WEIGHT: 190 LBS | SYSTOLIC BLOOD PRESSURE: 158 MMHG

## 2021-03-01 LAB
ALBUMIN SERPL BCP-MCNC: 4.5 G/DL (ref 3.2–4.9)
ALBUMIN/GLOB SERPL: 1.3 G/DL
ALP SERPL-CCNC: 96 U/L (ref 30–99)
ALT SERPL-CCNC: 51 U/L (ref 2–50)
AMPHET UR QL SCN: NEGATIVE
ANION GAP SERPL CALC-SCNC: 16 MMOL/L (ref 7–16)
AST SERPL-CCNC: 39 U/L (ref 12–45)
BARBITURATES UR QL SCN: NEGATIVE
BASOPHILS # BLD AUTO: 0.2 % (ref 0–1.8)
BASOPHILS # BLD: 0.02 K/UL (ref 0–0.12)
BENZODIAZ UR QL SCN: NEGATIVE
BILIRUB SERPL-MCNC: 0.3 MG/DL (ref 0.1–1.5)
BUN SERPL-MCNC: 9 MG/DL (ref 8–22)
BZE UR QL SCN: NEGATIVE
CALCIUM SERPL-MCNC: 10.1 MG/DL (ref 8.5–10.5)
CANNABINOIDS UR QL SCN: POSITIVE
CHLORIDE SERPL-SCNC: 102 MMOL/L (ref 96–112)
CO2 SERPL-SCNC: 19 MMOL/L (ref 20–33)
CREAT SERPL-MCNC: 0.71 MG/DL (ref 0.5–1.4)
EOSINOPHIL # BLD AUTO: 0 K/UL (ref 0–0.51)
EOSINOPHIL NFR BLD: 0 % (ref 0–6.9)
ERYTHROCYTE [DISTWIDTH] IN BLOOD BY AUTOMATED COUNT: 40.7 FL (ref 35.9–50)
GLOBULIN SER CALC-MCNC: 3.4 G/DL (ref 1.9–3.5)
GLUCOSE SERPL-MCNC: 93 MG/DL (ref 65–99)
HCT VFR BLD AUTO: 47.4 % (ref 37–47)
HGB BLD-MCNC: 16.3 G/DL (ref 12–16)
IMM GRANULOCYTES # BLD AUTO: 0.04 K/UL (ref 0–0.11)
IMM GRANULOCYTES NFR BLD AUTO: 0.3 % (ref 0–0.9)
LYMPHOCYTES # BLD AUTO: 1.81 K/UL (ref 1–4.8)
LYMPHOCYTES NFR BLD: 15.3 % (ref 22–41)
MCH RBC QN AUTO: 29.6 PG (ref 27–33)
MCHC RBC AUTO-ENTMCNC: 34.4 G/DL (ref 33.6–35)
MCV RBC AUTO: 86 FL (ref 81.4–97.8)
METHADONE UR QL SCN: NEGATIVE
MONOCYTES # BLD AUTO: 0.62 K/UL (ref 0–0.85)
MONOCYTES NFR BLD AUTO: 5.2 % (ref 0–13.4)
NEUTROPHILS # BLD AUTO: 9.37 K/UL (ref 2–7.15)
NEUTROPHILS NFR BLD: 79 % (ref 44–72)
NRBC # BLD AUTO: 0 K/UL
NRBC BLD-RTO: 0 /100 WBC
OPIATES UR QL SCN: NEGATIVE
OXYCODONE UR QL SCN: NEGATIVE
PCP UR QL SCN: NEGATIVE
PLATELET # BLD AUTO: 325 K/UL (ref 164–446)
PMV BLD AUTO: 9 FL (ref 9–12.9)
POC BREATHALIZER: 0.08 PERCENT (ref 0–0.01)
POTASSIUM SERPL-SCNC: 4.2 MMOL/L (ref 3.6–5.5)
PROPOXYPH UR QL SCN: NEGATIVE
PROT SERPL-MCNC: 7.9 G/DL (ref 6–8.2)
RBC # BLD AUTO: 5.51 M/UL (ref 4.2–5.4)
SODIUM SERPL-SCNC: 137 MMOL/L (ref 135–145)
T4 FREE SERPL-MCNC: 0.87 NG/DL (ref 0.93–1.7)
TSH SERPL DL<=0.005 MIU/L-ACNC: 3.35 UIU/ML (ref 0.38–5.33)
WBC # BLD AUTO: 11.9 K/UL (ref 4.8–10.8)

## 2021-03-01 PROCEDURE — 36415 COLL VENOUS BLD VENIPUNCTURE: CPT

## 2021-03-01 PROCEDURE — A9270 NON-COVERED ITEM OR SERVICE: HCPCS | Performed by: EMERGENCY MEDICINE

## 2021-03-01 PROCEDURE — 99285 EMERGENCY DEPT VISIT HI MDM: CPT

## 2021-03-01 PROCEDURE — 84443 ASSAY THYROID STIM HORMONE: CPT

## 2021-03-01 PROCEDURE — 700105 HCHG RX REV CODE 258: Performed by: EMERGENCY MEDICINE

## 2021-03-01 PROCEDURE — 302970 POC BREATHALIZER: Performed by: EMERGENCY MEDICINE

## 2021-03-01 PROCEDURE — 90791 PSYCH DIAGNOSTIC EVALUATION: CPT

## 2021-03-01 PROCEDURE — 700111 HCHG RX REV CODE 636 W/ 250 OVERRIDE (IP): Performed by: EMERGENCY MEDICINE

## 2021-03-01 PROCEDURE — 80053 COMPREHEN METABOLIC PANEL: CPT

## 2021-03-01 PROCEDURE — 85025 COMPLETE CBC W/AUTO DIFF WBC: CPT

## 2021-03-01 PROCEDURE — 700102 HCHG RX REV CODE 250 W/ 637 OVERRIDE(OP): Performed by: EMERGENCY MEDICINE

## 2021-03-01 PROCEDURE — 96372 THER/PROPH/DIAG INJ SC/IM: CPT

## 2021-03-01 PROCEDURE — 84439 ASSAY OF FREE THYROXINE: CPT

## 2021-03-01 PROCEDURE — 80307 DRUG TEST PRSMV CHEM ANLYZR: CPT

## 2021-03-01 RX ORDER — HALOPERIDOL 5 MG/ML
5 INJECTION INTRAMUSCULAR ONCE
Status: COMPLETED | OUTPATIENT
Start: 2021-03-01 | End: 2021-03-01

## 2021-03-01 RX ORDER — DIPHENHYDRAMINE HYDROCHLORIDE 50 MG/ML
50 INJECTION INTRAMUSCULAR; INTRAVENOUS ONCE
Status: DISCONTINUED | OUTPATIENT
Start: 2021-03-01 | End: 2021-03-01 | Stop reason: HOSPADM

## 2021-03-01 RX ORDER — BENAZEPRIL HYDROCHLORIDE 20 MG/1
20 TABLET ORAL
Status: DISCONTINUED | OUTPATIENT
Start: 2021-03-01 | End: 2021-03-01 | Stop reason: HOSPADM

## 2021-03-01 RX ORDER — VENLAFAXINE HYDROCHLORIDE 75 MG/1
150 CAPSULE, EXTENDED RELEASE ORAL
Status: DISCONTINUED | OUTPATIENT
Start: 2021-03-02 | End: 2021-03-01 | Stop reason: HOSPADM

## 2021-03-01 RX ORDER — CHLORAL HYDRATE 500 MG
1000 CAPSULE ORAL DAILY
Status: DISCONTINUED | OUTPATIENT
Start: 2021-03-01 | End: 2021-03-01 | Stop reason: HOSPADM

## 2021-03-01 RX ORDER — VENLAFAXINE HYDROCHLORIDE 150 MG/1
150 CAPSULE, EXTENDED RELEASE ORAL DAILY
Status: SHIPPED | COMMUNITY
End: 2023-10-01

## 2021-03-01 RX ORDER — LORAZEPAM 2 MG/ML
2 INJECTION INTRAMUSCULAR ONCE
Status: COMPLETED | OUTPATIENT
Start: 2021-03-01 | End: 2021-03-01

## 2021-03-01 RX ORDER — LEVOTHYROXINE SODIUM 0.05 MG/1
50 TABLET ORAL
Status: DISCONTINUED | OUTPATIENT
Start: 2021-03-01 | End: 2021-03-01 | Stop reason: HOSPADM

## 2021-03-01 RX ORDER — SODIUM CHLORIDE 9 MG/ML
1000 INJECTION, SOLUTION INTRAVENOUS ONCE
Status: COMPLETED | OUTPATIENT
Start: 2021-03-01 | End: 2021-03-01

## 2021-03-01 RX ORDER — OMEPRAZOLE 20 MG/1
20 CAPSULE, DELAYED RELEASE ORAL DAILY
Status: DISCONTINUED | OUTPATIENT
Start: 2021-03-01 | End: 2021-03-01 | Stop reason: HOSPADM

## 2021-03-01 RX ADMIN — THERA TABS 1 TABLET: TAB at 18:33

## 2021-03-01 RX ADMIN — SODIUM CHLORIDE 1000 ML: 9 INJECTION, SOLUTION INTRAVENOUS at 18:26

## 2021-03-01 RX ADMIN — LEVOTHYROXINE SODIUM 50 MCG: 0.05 TABLET ORAL at 18:38

## 2021-03-01 RX ADMIN — SODIUM CHLORIDE 1000 ML: 9 INJECTION, SOLUTION INTRAVENOUS at 17:11

## 2021-03-01 RX ADMIN — BENAZEPRIL HYDROCHLORIDE 20 MG: 20 TABLET ORAL at 18:39

## 2021-03-01 RX ADMIN — OMEGA-3 FATTY ACIDS CAP 1000 MG 1000 MG: 1000 CAP at 18:34

## 2021-03-01 RX ADMIN — OMEPRAZOLE 20 MG: 20 CAPSULE, DELAYED RELEASE ORAL at 18:34

## 2021-03-01 RX ADMIN — ASPIRIN 81 MG: 81 TABLET, COATED ORAL at 18:36

## 2021-03-01 RX ADMIN — LORAZEPAM 2 MG: 2 INJECTION INTRAMUSCULAR; INTRAVENOUS at 13:36

## 2021-03-01 RX ADMIN — HALOPERIDOL LACTATE 5 MG: 5 INJECTION, SOLUTION INTRAMUSCULAR at 13:45

## 2021-03-01 ASSESSMENT — LIFESTYLE VARIABLES: DO YOU DRINK ALCOHOL: YES

## 2021-03-01 ASSESSMENT — FIBROSIS 4 INDEX: FIB4 SCORE: 1.01

## 2021-03-01 NOTE — ED PROVIDER NOTES
ED Provider Note    CHIEF COMPLAINT  Chief Complaint   Patient presents with   • Legal 2000     BIB PD from [place] on L2K. pt was taken there today by son for SI. pt on arrival there was intoxicated and aggressive. brought here by PD.    • Alcohol Intoxication   • Suicidal Ideation   • Aggressive Behavior       HPI  Zainab Guy is a 60 y.o. female who presents to the emergency department from [facility].  The patient was taken there today very sad as the patient was intoxicated and stated that she wanted to kill her self arrival at facility.  Patient became extremely aggressive with the staff there and the police department was called brought to our facility.  She does have aggressive behavior, suicidal ideation medical intoxication, she states she is depressed and wants help and that she did do anything wrong.  She was screaming at me and screaming at staff and charging at the staff initially but we are able to talk her down and communicate with her.  She does not feel that her right should be taken and does feel depressed and suicidal but not a criminal.    REVIEW OF SYSTEMS  Positives as above. Pertinent negatives include fever, shakes, chills, sweats  All other 10 review of systems are negative    PAST MEDICAL HISTORY  Past Medical History:   Diagnosis Date   • Alcohol abuse    • GERD (gastroesophageal reflux disease)    • HTN (hypertension)    • Hypercholesteremia    • Hypothyroidism    • Obesity    • Osteoarthritis        FAMILY HISTORY  Noncontributory    SOCIAL HISTORY  Social History     Socioeconomic History   • Marital status: Single     Spouse name: Not on file   • Number of children: Not on file   • Years of education: Not on file   • Highest education level: Not on file   Occupational History   • Not on file   Tobacco Use   • Smoking status: Current Every Day Smoker     Packs/day: 0.50     Years: 25.00     Pack years: 12.50     Types: Cigarettes     Last attempt to quit: 10/2004     Years  since quittin.4   • Smokeless tobacco: Never Used   Substance and Sexual Activity   • Alcohol use: Yes     Comment: drank 1/2 bottle of schnapps   • Drug use: No   • Sexual activity: Not Currently     Birth control/protection: Post-Menopausal   Other Topics Concern   • Not on file   Social History Narrative   • Not on file     Social Determinants of Health     Financial Resource Strain:    • Difficulty of Paying Living Expenses:    Food Insecurity:    • Worried About Running Out of Food in the Last Year:    • Ran Out of Food in the Last Year:    Transportation Needs:    • Lack of Transportation (Medical):    • Lack of Transportation (Non-Medical):    Physical Activity:    • Days of Exercise per Week:    • Minutes of Exercise per Session:    Stress:    • Feeling of Stress :    Social Connections:    • Frequency of Communication with Friends and Family:    • Frequency of Social Gatherings with Friends and Family:    • Attends Yazidism Services:    • Active Member of Clubs or Organizations:    • Attends Club or Organization Meetings:    • Marital Status:    Intimate Partner Violence:    • Fear of Current or Ex-Partner:    • Emotionally Abused:    • Physically Abused:    • Sexually Abused:        SURGICAL HISTORY  Past Surgical History:   Procedure Laterality Date   • ABDOMINAL HYSTERECTOMY TOTAL     • KNEE ORIF Bilateral        CURRENT MEDICATIONS  Home Medications     Reviewed by Lit Jimenez R.N. (Registered Nurse) on 21 at 1255  Med List Status: Partial   Medication Last Dose Status   aspirin (ASA) 81 MG Chew Tab chewable tablet  Active   atorvastatin (LIPITOR) 80 MG tablet  Active   benazepril (LOTENSIN) 20 MG Tab  Active   citalopram (CELEXA) 20 MG Tab  Active   hydroCHLOROthiazide (HYDRODIURIL) 25 MG Tab  Active   levothyroxine (SYNTHROID) 50 MCG Tab  Active   multivitamin (THERAGRAN) Tab  Active   Omega-3 Fatty Acids (FISH OIL) 1000 MG Cap capsule  Active   omeprazole (PRILOSEC) 20 MG  "delayed-release capsule  Active                ALLERGIES  No Known Allergies    PHYSICAL EXAM  VITAL SIGNS: /81   Pulse (!) 135   Temp 36.7 °C (98 °F) (Temporal)   Resp 18   Ht 1.651 m (5' 5\")   Wt 86.2 kg (190 lb)   SpO2 94%   BMI 31.62 kg/m²      Constitutional: Slight acute distress, Non-toxic appearance.   Eyes: PERRLA, EOMI, Conjunctiva normal, No discharge.   Cardiovascular: Tachycardic normal rhythm, No murmurs, No rubs, No gallops, and intact distal pulses.   Thorax & Lungs:  No respiratory distress, no rales, no rhonchi, No wheezing, No chest wall tenderness.   Abdomen: Bowel sounds normal, Soft, No tenderness, No guarding, No rebound, No pulsatile masses.   Skin: Warm, Dry, No erythema, No rash.   Extremities: Full range of motion, no deformity, no edema.  Neurologic: Alert & oriented x 3, No focal deficits noted, acting appropriately on exam.  Psychiatric: Agitated    Results for orders placed or performed during the hospital encounter of 03/01/21   POC BREATHALIZER   Result Value Ref Range    POC Breathalizer 0.08 (A) 0.00 - 0.01 Percent       COURSE & MEDICAL DECISION MAKING  Pertinent Labs & Imaging studies reviewed. (See chart for details)  Patient presents with initial alcohol intoxication but on evaluation she is legally sober.  The patient is extremely depressed, extremely agitated and states that she is suicidal yet does not believe her right should be taken away.  She received Ativan 2 mg IM and Haldol 5 mg IM with significant sedative effect and the patient is resting comfortably.  The patient has light tachycardic therefore she received 2 L of normal saline and on reevaluation she had normal heart rate, labs were taken and she has normal electrolytes no significant abnormality.  The patient was seen by our life skills individual and we do believe the patient is medically stable for transfer to a local psychiatric facility.    Reevaluation at 1700, the patient is resting comfortably " and she states she is taking all her medications and is not in need of anything.      FINAL IMPRESSION  Suicidal ideation  Depression           Electronically signed by: Dwayne Little D.O., 3/1/2021 3:53 PM

## 2021-03-01 NOTE — ED NOTES
Linen reapplied to [facility]. Offered to help pt to [facility]. Refused. Pt wanting to call son. Educated on L2K policy.

## 2021-03-01 NOTE — CONSULTS
"[facility]   TRIAGE ASSESSMENT    Name: Zainab Guy  MRN: [id number]  : 1960  Age: 60 y.o.  Date of assessment: 3/1/2021  PCP: MAURO oRse.  Persons in attendance: Patient    CHIEF COMPLAINT/PRESENTING ISSUE    Chief Complaint   Patient presents with   • Legal      BIB PD from [place] on L2K. pt was taken there today by son for SI. pt on arrival there was intoxicated and aggressive. brought here by PD.    • Alcohol Intoxication   • Suicidal Ideation   • Aggressive Behavior      Per triage note:  \"Pt to room for above with PD.   Pt initially had cuffs on from PD and demanded pictures taken of marks to wrists in which PD did.      Pt grossly intoxicated and uncooperative.   Security called to bedside. Pt refusing to answer questions, charges at staff, began fighting security. Then assisted in changing pt. Pt then somewhat calmed down and cried. Pt changed into gown but wouldn't remove white, sheer, thin undergarments. Pt then removed gown and wrapped self in sheet.   Under 1:1 obs of designee in hallway.  Continually yelling out and showing aggressive behaviors.\"        Etoh 0.08.  Her behaviors continued to escalate and she required IM PRN of haldol and ativan for agitation.    At the time of my assessment, pt a+ox4.  She agrees that she voiced SI this morning and asked her son to take her for help.  She refused to answer much else, said \"this is fucking bullshit that you dragged me here against my will.\"      CURRENT LIVING SITUATION/SOCIAL SUPPORT: Lives with her adult son, Pako.  Supportive friends and family.    I spoke with her son Pako.  He reports his mother's mental health has been declining since she lost her job 6 months ago.  He says she lost her job d/t COVID. Says they had a long talk this morning, that she was not grossly intoxicated but did have \"a few shots.\"  He says she reported SI to him and told him she needed to get help right away, so he took her " "to [facility].  He wondered why they sent her to the ER, \"they told me to bring her there, that they would admit her there.  They knew she didn't have insurance and told me to bring her anyways.  Isn't it illegal for them to send her away?\"    Of note from chart review:  First encounters in this EMR from 2017, to establish care with PCP at [facility].  Moved to [place] for job at [employer].  Noted home med of celexa for depression.  Denied any past SA.  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    8/7/2020: ER for etoh intox and SI.  Lost her job that week.  Came to ER with her son, who she was living with.  He reported heavy etoh use x1wk.  She reported ideation to son to OD or drink self to death.  Seen by Alert Team.  Denied SI upon sobriety.  DC'd to self and advised to f/u.    9/27/2020: ER For SA, etoh intox.  Her son called 911 after she texted him, \"goodbye.\"  Placed on LH by [employer].  She had tried to OD on pan meds, self harmed with broken glass, hitting self in face with guitar and broke her own orbital socket.  Noted to require restraint for physical aggression towards staff while intoxicated.  Seen by Alert Team.  \"Patient also vocalized to authorities her intent to continue to attempt suicide and recent SA x 1 month ago which was documented on legal hold.\"    BEHAVIORAL HEALTH TREATMENT HISTORY  Does patient/parent report a history of prior behavioral health treatment for patient?   Yes:    Dates Level of Care Facilty/Provider Diagnosis/Problem Medications          9/2020 inpt [facility] SA, etoh    2007-current outpt Dr Burris, PCP [facility], [place] Celexa, now on effexor                                                      Pt's son reports she was started on Effexor during her inpt admission at  9/2020.  Her son reported \"I have spent my whole life with my mom.  I can tell you this new medicine is not working for her.  And it gives her nightmares.\"  She has not established with an outpt psychiatrist d/t " finances and lack of insurance.   She will need referrals from [facility] of places to f/u as an uninsured pt.      SAFETY ASSESSMENT - SELF  Does patient acknowledge current or past symptoms of dangerousness to self? yes  Does parent/significant other report patient has current or past symptoms of dangerousness to self? yes  Does presenting problem suggest symptoms of dangerousness to self? Yes:     Past Current    Suicidal Thoughts: [x]  [x]    Suicidal Plans: [x]  []    Suicidal Intent: [x]  []    Suicide Attempts: [x]  []    Self-Injury []  []      For any boxes checked above, provide detail: Hx SA less than 6 mos ago, possibly twice.  Currently expressing SI, scored HIGH on [place] Screening.    History of suicide by family member: no  History of suicide by friend/significant other: no  Recent change in frequency/specificity/intensity of suicidal thoughts or self-harm behavior? yes - she won't give details  Protective factors present:  Strong family connections and Willing to address in treatment    SAFETY ASSESSMENT - OTHERS  Does patient acknowledge current or past symptoms of aggressive behavior or risk to others? no  Does parent/significant other report patient has current or past symptoms of aggressive behavior or risk to others?  N\A  Does presenting problem suggest symptoms of dangerousness to others? No   Pt has hx of and current aggressive behaviors towards staff.  Generally noted when intoxicated, and her son reports she isn't aggressive when sober.  Today, she was barely above the legal limit but still physically aggressive towards staff.    Crisis Safety Plan completed and copy given to patient? N\A    ABUSE/NEGLECT SCREENING  Does patient report feeling “unsafe” in his/her home, or afraid of anyone?  no  Does patient report any history of physical, sexual, or emotional abuse?  no  Does parent or significant other report any of the above? N\A  Is there evidence of neglect by self?  no  Is  "there evidence of neglect by a caregiver? no  Does the patient/parent report any history of CPS/APS/police involvement related to suspected abuse/neglect or domestic violence? no  Based on the information provided during the current assessment, is a mandated report of suspected abuse/neglect being made?  No    SUBSTANCE USE SCREENING  Yes:  Robert all substances used in the past 30 days:      Last Use Amount   [x]   Alcohol     []   Marijuana     []   Heroin     []   Prescription Opioids  (used without prescription, for    recreation, or in excess of prescribed amount)     []   Other Prescription  (used without prescription, for    recreation, or in excess of prescribed amount)     []   Cocaine      []   Methamphetamine     []   \"\" drugs (ectasy, MDMA)     []   Other substances        UDS results: refusing to provide sample  Breathalyzer results: 0.116--0.08    What consequences does the patient associate with any of the above substance use and or addictive behaviors? Health problems:    Risk factors for detox (check all that apply):  []  Seizures   []  Diaphoretic (sweating)   []  Tremors   []  Hallucinations   []  Increased blood pressure   []  Decreased blood pressure   []  Other   [x]  None      [x] Patient education on risk factors for detoxification and instructed to return to ER as needed.      MENTAL STATUS   Participation: Limited verbal participation, Guarded, Defensive and Resistant  Grooming: Casual  Orientation: Alert and Fully Oriented  Behavior: Tense and Aggressive  Eye contact: Poor  Mood: Depressed, Angry and Irritable  Affect: Angry  Thought process: Goal-directed  Thought content: Within normal limits  Speech: Rate within normal limits and Volume within normal limits  Perception: Within normal limits  Memory:  unable to assess d/t pt refusal  Insight: Poor  Judgment:  Poor  Other:    Collateral information: past EMR  Source:  [] Significant other present in person:   [] Significant other by " telephone  [] [facility]   [] [facility] Nursing Staff  [x] [facility]     CLINICAL IMPRESSIONS:  Primary:  SI  Secondary:  Alcohol use d/o       IDENTIFIED NEEDS/PLAN:  [Trigger DISPOSITION list for any items marked]    [x]  Imminent safety risk - self [] Imminent safety risk - others   [x]  Acute substance withdrawal []  Psychosis/Impaired reality testing   [x]  Mood/anxiety [x]  Substance use/Addictive behavior   [x]  Maladaptive behaviro []  Parent/child conflict   []  Family/Couples conflict []  Biomedical   []  Housing []  Financial   []   Legal  Occupational/Educational   []  Domestic violence []  Other:     Recommended Plan of Care:  Actively being addressed by Legal Hold and [facility] and 1:1 Observation   Pt scored HIGH on [place] screening and requires 1:1 sitter at all times.    Does patient express agreement with the above plan? no    Referral appointment(s) scheduled? N\A    Alert team only: Pt to remain on LH for SI.  Recent hx of SA.  I have discussed findings and recommendations with Dr. Little, who is in agreement with these recommendations.     Referral information sent to the following community providers : per     If applicable : Referred  to : Adriana for legal hold follow up at (time): 1630      Kandace Lynn R.N.  3/1/2021

## 2021-03-01 NOTE — ED NOTES
Pt allowed RN to get VS. HR noted. Other VSS. Denies pain.  Refuses to wear gown. Remains in thin undergarments.   Will cont to monitor.

## 2021-03-01 NOTE — ED NOTES
Pt belongings checked by security. Two bags of belongings placed in locker 5. Pt with personal pill box with unlabeled meds. Pharm tech took pills to identify prior to placing in bag.

## 2021-03-01 NOTE — DISCHARGE PLANNING
Alert Team  Noted consult order placed.  Pt not currently ready for consult.  Will need:  -legal sobriety noted in results tab  -PAR to complete her registration.    Bedside RN to send UDS STAT.    Once pt is ready for consult, bedside RN to contact AT via Voalte to have pt added to list of consults.  JESSICA

## 2021-03-01 NOTE — ED TRIAGE NOTES
Chief Complaint   Patient presents with   • Legal 2000     BIB PD from [place] on L2K. pt was taken there today by son for SI. pt on arrival there was intoxicated and aggressive. brought here by PD.    • Alcohol Intoxication   • Suicidal Ideation   • Aggressive Behavior     Pt to room for above with PD.   Pt initially had cuffs on from PD and demanded pictures taken of marks to wrists in which PD did.     Pt grossly intoxicated and uncooperative.   Security called to bedside. Pt refusing to answer questions, charges at staff, began fighting security. Then assisted in changing pt. Pt then somewhat calmed down and cried. Pt changed into gown but wouldn't remove white, sheer, thin undergarments. Pt then removed gown and wrapped self in sheet.   Under 1:1 obs of designee in hallway.  Continually yelling out and showing aggressive behaviors.

## 2021-03-01 NOTE — ED NOTES
Med rec updated and complete  Allergies reviewed  Pt had a pill box at bedside, had pharmacist identify medications.  Returned pill box back to pts nurse  Asked pt last time she took her medications, pt reports that she is not sure when she took her medications  Pt reports no antibiotics in the last 2 weeks

## 2021-03-02 NOTE — DISCHARGE PLANNING
SW spoke to Ava at [facility], they will accept Pt back.   Dr. Ambrosio will be admitting physician.     SW will arrange transportation and update RN.

## 2021-03-02 NOTE — DISCHARGE PLANNING
SW received copy of Pt Legal Hold from Alert Team Knadace Lynn.     SW will send mental health referral once H&P, Alert Team Assessment is placed in chart and UDS is resulted.

## 2021-03-02 NOTE — ED NOTES
Pt ambulatory to and from bathroom. UA collected and sent. Meds given per MAR. Pt on phone with son.     Pt remains under observation of designee in hallway.

## 2021-03-02 NOTE — DISCHARGE PLANNING
Medical Social Work    Referral: Legal Hold    Intervention: Legal Hold Paperwork given to SW by Life Skills RN Kandace Lynn    Legal Hold Initiated: Date: 03- Time: 1212    Patient’s Insurance Listed on Face Sheet: None    Referrals sent to: [facility], [facility]    This referral contains the following information:  1) Face sheet _x___  2) Page 1 and Page 2 of Legal Hold _x___  3) Alert Team Assessment/Psych Assessment __x__  4) Head to toe physical exam __x__  5) Urine Drug Screen __not obtained at this time__  6) Blood Alcohol __x__  7) Vital signs _x___  8) Pregnancy test when applicable _NA__  9) Medications list _x___    Plan: Patient will transfer to mental health facility once acceptance is obtained

## 2021-03-02 NOTE — ED NOTES
Pt updated on plan of care. Pt at first refused IV. educated on importance of getting labs and bringing down HR.   PIV initiated, blood drawn and sent to lab. 1L NS infusing.

## 2021-03-02 NOTE — DISCHARGE PLANNING
Medical Social Work    Referral: Legal hold Transfer to Mental Health Facility    Intervention: DEBRA received report from AM DEBRA Wright that pt will return to [facility] once medically cleared.  MSW received a call from bedside RN that pt is ready to return to [facility].  MSW contacted Trixie at [facility] who states that pt can be arranged for transport around 2100; receiving physician Dr. Ambrosio.    DEBRA arranged for transportation to be set up through [place] with [facility].     The pt will be picked up at 2100.     DEBRA notified the RN of the departure time as well as accepting facility.     DEBRA created transfer packet and placed on chart. Original Legal Hold placed in packet.     Plan: Pt will transfer to [facility] at 2100.

## 2021-03-02 NOTE — ED NOTES
Patient's home medications have been reviewed by the pharmacy team.     Past Medical History:   Diagnosis Date   • Alcohol abuse    • GERD (gastroesophageal reflux disease)    • HTN (hypertension)    • Hypercholesteremia    • Hypothyroidism    • Obesity    • Osteoarthritis        Patient's Medications   New Prescriptions    No medications on file   Previous Medications    ASPIRIN (ASA) 81 MG CHEW TAB CHEWABLE TABLET    Take 1 Tab by mouth every day.    ASPIRIN EC (ECOTRIN) 81 MG TABLET DELAYED RESPONSE    Take 81 mg by mouth every day.    ATORVASTATIN (LIPITOR) 80 MG TABLET    TAKE 1 TABLET BY MOUTH ONCE DAILY AT BEDTIME    BENAZEPRIL (LOTENSIN) 20 MG TAB    Take 1 tablet by mouth once daily    HYDROCHLOROTHIAZIDE (HYDRODIURIL) 25 MG TAB    Take 1 tablet by mouth once daily    LEVOTHYROXINE (SYNTHROID) 50 MCG TAB    TAKE 1 TABLET BY MOUTH ONCE DAILY IN THE MORNING ON AN EMPTY STOMACH    MULTIVITAMIN (THERAGRAN) TAB    Take 1 Tab by mouth every day.    OMEGA-3 FATTY ACIDS (FISH OIL) 1000 MG CAP CAPSULE    Take 1,000 mg by mouth every day.    OMEPRAZOLE (PRILOSEC) 20 MG DELAYED-RELEASE CAPSULE    Take 1 Cap by mouth every day.    PSYLLIUM PO    Take 1 capsule by mouth every day.    VENLAFAXINE (EFFEXOR-XR) 150 MG EXTENDED-RELEASE CAPSULE    Take 150 mg by mouth every day.   Modified Medications    No medications on file   Discontinued Medications    CITALOPRAM (CELEXA) 20 MG TAB    Take 20 mg by mouth every day.          A:  Med rec obtained by identifying medications in patient's home pill box.  Unknown when she last took them but she did state that was all she takes is what's in her pill box.      P:  Resuming home medications and ordering labs to ensure continued appropriateness.      Aleyda Wright, KeshavD

## 2021-03-02 NOTE — ED NOTES
Pt pill box placed in security bag #[id number].  Dual nurse sign off. Given to pharmacy. Tag placed in pt chart.

## 2021-03-02 NOTE — ED NOTES
Patient updated on plan of care.  Waiting for transport back to [facility].  Patient denies needs at this time.  IV removed.

## 2021-03-02 NOTE — DISCHARGE PLANNING
SW notified by RN that ERP has ordered more blood work on Pt.     SW has updated [facility] and once Pt is Medically Cleared arrangements will be made to transfer Pt to [facility].     SW will monitor.

## 2021-03-02 NOTE — ED NOTES
DEWEY at bedside.  Belongings handed to [facility], including medication box.  All questions answered.  Patient ambulatory with steady gait.  Discharged to [facility]

## 2021-03-15 DIAGNOSIS — Z23 NEED FOR VACCINATION: ICD-10-CM

## 2021-05-18 ENCOUNTER — HOSPITAL ENCOUNTER (OUTPATIENT)
Dept: RADIOLOGY | Facility: MEDICAL CENTER | Age: 61
End: 2021-05-18
Attending: STUDENT IN AN ORGANIZED HEALTH CARE EDUCATION/TRAINING PROGRAM
Payer: COMMERCIAL

## 2021-05-18 DIAGNOSIS — Z12.31 VISIT FOR SCREENING MAMMOGRAM: ICD-10-CM

## 2021-05-18 PROCEDURE — 77063 BREAST TOMOSYNTHESIS BI: CPT

## 2023-09-30 ENCOUNTER — HOSPITAL ENCOUNTER (EMERGENCY)
Facility: MEDICAL CENTER | Age: 63
End: 2023-10-01
Attending: STUDENT IN AN ORGANIZED HEALTH CARE EDUCATION/TRAINING PROGRAM
Payer: COMMERCIAL

## 2023-09-30 ENCOUNTER — APPOINTMENT (OUTPATIENT)
Dept: RADIOLOGY | Facility: MEDICAL CENTER | Age: 63
End: 2023-09-30
Attending: STUDENT IN AN ORGANIZED HEALTH CARE EDUCATION/TRAINING PROGRAM
Payer: COMMERCIAL

## 2023-09-30 DIAGNOSIS — R45.851 SUICIDAL IDEATION: ICD-10-CM

## 2023-09-30 DIAGNOSIS — F10.920 ALCOHOLIC INTOXICATION WITHOUT COMPLICATION (HCC): ICD-10-CM

## 2023-09-30 DIAGNOSIS — T71.162A SUICIDE ATTEMPT BY HANGING, INITIAL ENCOUNTER (HCC): ICD-10-CM

## 2023-09-30 PROCEDURE — 85025 COMPLETE CBC W/AUTO DIFF WBC: CPT

## 2023-09-30 PROCEDURE — 93005 ELECTROCARDIOGRAM TRACING: CPT | Performed by: STUDENT IN AN ORGANIZED HEALTH CARE EDUCATION/TRAINING PROGRAM

## 2023-09-30 PROCEDURE — 80053 COMPREHEN METABOLIC PANEL: CPT

## 2023-09-30 PROCEDURE — 99285 EMERGENCY DEPT VISIT HI MDM: CPT

## 2023-09-30 PROCEDURE — 71045 X-RAY EXAM CHEST 1 VIEW: CPT

## 2023-09-30 PROCEDURE — 82077 ASSAY SPEC XCP UR&BREATH IA: CPT

## 2023-09-30 PROCEDURE — 36415 COLL VENOUS BLD VENIPUNCTURE: CPT

## 2023-10-01 ENCOUNTER — APPOINTMENT (OUTPATIENT)
Dept: RADIOLOGY | Facility: MEDICAL CENTER | Age: 63
End: 2023-10-01
Attending: STUDENT IN AN ORGANIZED HEALTH CARE EDUCATION/TRAINING PROGRAM
Payer: COMMERCIAL

## 2023-10-01 VITALS
HEART RATE: 101 BPM | TEMPERATURE: 98.7 F | DIASTOLIC BLOOD PRESSURE: 105 MMHG | BODY MASS INDEX: 31.65 KG/M2 | RESPIRATION RATE: 18 BRPM | SYSTOLIC BLOOD PRESSURE: 145 MMHG | HEIGHT: 65 IN | WEIGHT: 190 LBS | OXYGEN SATURATION: 91 %

## 2023-10-01 LAB
ALBUMIN SERPL BCP-MCNC: 4.6 G/DL (ref 3.2–4.9)
ALBUMIN/GLOB SERPL: 1.5 G/DL
ALP SERPL-CCNC: 103 U/L (ref 30–99)
ALT SERPL-CCNC: 37 U/L (ref 2–50)
AMPHET UR QL SCN: NEGATIVE
ANION GAP SERPL CALC-SCNC: 22 MMOL/L (ref 7–16)
AST SERPL-CCNC: 31 U/L (ref 12–45)
BARBITURATES UR QL SCN: NEGATIVE
BASOPHILS # BLD AUTO: 0.3 % (ref 0–1.8)
BASOPHILS # BLD: 0.02 K/UL (ref 0–0.12)
BENZODIAZ UR QL SCN: POSITIVE
BILIRUB SERPL-MCNC: 0.2 MG/DL (ref 0.1–1.5)
BUN SERPL-MCNC: 16 MG/DL (ref 8–22)
BZE UR QL SCN: NEGATIVE
CALCIUM ALBUM COR SERPL-MCNC: 8.3 MG/DL (ref 8.5–10.5)
CALCIUM SERPL-MCNC: 8.8 MG/DL (ref 8.5–10.5)
CANNABINOIDS UR QL SCN: NEGATIVE
CHLORIDE SERPL-SCNC: 103 MMOL/L (ref 96–112)
CO2 SERPL-SCNC: 17 MMOL/L (ref 20–33)
CREAT SERPL-MCNC: 1.02 MG/DL (ref 0.5–1.4)
EKG IMPRESSION: NORMAL
EOSINOPHIL # BLD AUTO: 0.06 K/UL (ref 0–0.51)
EOSINOPHIL NFR BLD: 0.9 % (ref 0–6.9)
ERYTHROCYTE [DISTWIDTH] IN BLOOD BY AUTOMATED COUNT: 43.4 FL (ref 35.9–50)
ETHANOL BLD-MCNC: 218.2 MG/DL
FENTANYL UR QL: NEGATIVE
GFR SERPLBLD CREATININE-BSD FMLA CKD-EPI: 62 ML/MIN/1.73 M 2
GLOBULIN SER CALC-MCNC: 3.1 G/DL (ref 1.9–3.5)
GLUCOSE SERPL-MCNC: 123 MG/DL (ref 65–99)
HCT VFR BLD AUTO: 44.5 % (ref 37–47)
HGB BLD-MCNC: 15.4 G/DL (ref 12–16)
IMM GRANULOCYTES # BLD AUTO: 0.02 K/UL (ref 0–0.11)
IMM GRANULOCYTES NFR BLD AUTO: 0.3 % (ref 0–0.9)
LYMPHOCYTES # BLD AUTO: 2.49 K/UL (ref 1–4.8)
LYMPHOCYTES NFR BLD: 38.4 % (ref 22–41)
MCH RBC QN AUTO: 30.9 PG (ref 27–33)
MCHC RBC AUTO-ENTMCNC: 34.6 G/DL (ref 32.2–35.5)
MCV RBC AUTO: 89.4 FL (ref 81.4–97.8)
METHADONE UR QL SCN: NEGATIVE
MONOCYTES # BLD AUTO: 0.55 K/UL (ref 0–0.85)
MONOCYTES NFR BLD AUTO: 8.5 % (ref 0–13.4)
NEUTROPHILS # BLD AUTO: 3.35 K/UL (ref 1.82–7.42)
NEUTROPHILS NFR BLD: 51.6 % (ref 44–72)
NRBC # BLD AUTO: 0 K/UL
NRBC BLD-RTO: 0 /100 WBC (ref 0–0.2)
OPIATES UR QL SCN: NEGATIVE
OXYCODONE UR QL SCN: NEGATIVE
PCP UR QL SCN: NEGATIVE
PLATELET # BLD AUTO: 300 K/UL (ref 164–446)
PMV BLD AUTO: 9 FL (ref 9–12.9)
POC BREATHALIZER: 0.01 PERCENT (ref 0–0.01)
POTASSIUM SERPL-SCNC: 3 MMOL/L (ref 3.6–5.5)
PROPOXYPH UR QL SCN: NEGATIVE
PROT SERPL-MCNC: 7.7 G/DL (ref 6–8.2)
RBC # BLD AUTO: 4.98 M/UL (ref 4.2–5.4)
SODIUM SERPL-SCNC: 142 MMOL/L (ref 135–145)
WBC # BLD AUTO: 6.5 K/UL (ref 4.8–10.8)

## 2023-10-01 PROCEDURE — 90791 PSYCH DIAGNOSTIC EVALUATION: CPT

## 2023-10-01 PROCEDURE — 70498 CT ANGIOGRAPHY NECK: CPT

## 2023-10-01 PROCEDURE — 302970 POC BREATHALIZER

## 2023-10-01 PROCEDURE — 80307 DRUG TEST PRSMV CHEM ANLYZR: CPT

## 2023-10-01 PROCEDURE — 700117 HCHG RX CONTRAST REV CODE 255: Performed by: STUDENT IN AN ORGANIZED HEALTH CARE EDUCATION/TRAINING PROGRAM

## 2023-10-01 RX ORDER — BENAZEPRIL HYDROCHLORIDE 40 MG/1
40 TABLET ORAL DAILY
Status: SHIPPED | COMMUNITY
End: 2023-11-29

## 2023-10-01 RX ADMIN — IOHEXOL 80 ML: 350 INJECTION, SOLUTION INTRAVENOUS at 01:53

## 2023-10-01 ASSESSMENT — PAIN DESCRIPTION - PAIN TYPE: TYPE: ACUTE PAIN
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
## 2023-10-01 NOTE — ED NOTES
Assumed care of pt, received bedside report from ANA Apple. SI precautions in place. Safety rounds completed, pt resting comfortably in the room on 2L O2 wearing a paper gown. Verified that Legal Hold appropriate and signed in patient's chart. Pt educated about legal hold procedures and pt verbalized understanding. 1:1 sitter continued per protocol for close monitoring. Continuous visual monitoring by Trained Personnel. Sitter has unobstructed view of patient at all times. Discussion with sitter about patient care, safety and support.

## 2023-10-01 NOTE — ED PROVIDER NOTES
ED Provider Note    CHIEF COMPLAINT  Chief Complaint   Patient presents with    Suicidal Ideation       EXTERNAL RECORDS REVIEWED  Previous ER visits in  for suicidal ideation, aggressive behavior, alcohol intoxication and legal hold.    HPI/ROS  LIMITATION TO HISTORY   Select: Behavior  OUTSIDE HISTORIAN(S):  EMS Report included below    Zainab Guy is a 63 y.o. female who presents with suicidal ideation and intentional attempt to harm herself.  She was reportedly found by [employer] hanging herself with a bed sheet from apartment.  She did not however have that sheet around her neck and the sheet was around her arm.  Patient was extremely agitated and required 5 of Haldol and 5 of Versed by EMS.  On arrival patient is somnolent but arousable and is unable to participate in history.     PAST MEDICAL HISTORY   has a past medical history of Alcohol abuse, GERD (gastroesophageal reflux disease), HTN (hypertension), Hypercholesteremia, Hypothyroidism, Obesity, and Osteoarthritis.    SURGICAL HISTORY   has a past surgical history that includes orif, knee (Bilateral) and abdominal hysterectomy total.    FAMILY HISTORY  Family History   Problem Relation Age of Onset    Diabetes Mother     Hypertension Mother     Hypertension Father        SOCIAL HISTORY  Social History     Tobacco Use    Smoking status: Every Day     Current packs/day: 0.00     Average packs/day: 0.5 packs/day for 25.0 years (12.5 ttl pk-yrs)     Types: Cigarettes     Start date: 10/1979     Last attempt to quit: 10/2004     Years since quittin.0    Smokeless tobacco: Never   Vaping Use    Vaping Use: Not on file   Substance and Sexual Activity    Alcohol use: Yes     Comment: drank 1/2 bottle of schnapps    Drug use: No    Sexual activity: Not Currently     Birth control/protection: Post-Menopausal       CURRENT MEDICATIONS  Home Medications    **Home medications have not yet been reviewed for this encounter**         ALLERGIES  No Known  "Allergies    PHYSICAL EXAM  VITAL SIGNS: BP (!) 161/74   Pulse 93   Temp 36.3 °C (97.3 °F) (Temporal)   Resp 16   Ht 1.651 m (5' 5\")   Wt 86.2 kg (190 lb)   SpO2 96%   BMI 31.62 kg/m²    Constitutional: Awake and alert. Nontoxic  HENT: Normocephalic atraumatic  Eyes: Grossly normal  Neck: There is no tenderness to palpation.  No ligature marks.  No petechiae or bruit.  There is no evidence of hematoma Cardiovascular: Normal heart rate   Thorax & Lungs: No respiratory distress. On 6L oxygen.  Clear lungs   Abdomen: Nontender  Skin:  No pathologic rash.   Neuro: She is somnolent but arousable, no focal deficit  Extremities: Well perfused  Psychiatric: Affect normal      DIAGNOSTIC STUDIES / PROCEDURES  EKG  I have independently interpreted this EKG  Results for orders placed or performed during the hospital encounter of 23   EKG   Result Value Ref Range    Report       [facility] Emergency Dept.    Test Date:  2023-10-01  Pt Name:    MARANDA CESPEDES             Department: ER  MRN:        [id number]                      Room:        21  Gender:     Female                       Technician: [id number]  :        1960                   Requested By:GERALDINE WELLS  Order #:    [id number]                    Reading MD: Geraldine Wells    Measurements  Intervals                                Axis  Rate:       92                           P:          84  FL:         184                          QRS:        -9  QRSD:       100                          T:          130  QT:         390  QTc:        483    Interpretive Statements  Sinus rhythm  LVH with secondary repolarization abnormality  Inferior infarct, old  Anterior infarct, old  Compared to ECG 2020 21:55:36  Left ventricular hypertrophy now present  Early repolarization now present  Myocardial infarct finding still present  Electronically Signed On 10- 02:19:20 PDT by Pedro Wells           LABS  Results for orders placed or " performed during the hospital encounter of 09/30/23   DIAGNOSTIC ALCOHOL (BA)   Result Value Ref Range    Diagnostic Alcohol 218.2 (H) <10.1 mg/dL   CBC WITH DIFFERENTIAL   Result Value Ref Range    WBC 6.5 4.8 - 10.8 K/uL    RBC 4.98 4.20 - 5.40 M/uL    Hemoglobin 15.4 12.0 - 16.0 g/dL    Hematocrit 44.5 37.0 - 47.0 %    MCV 89.4 81.4 - 97.8 fL    MCH 30.9 27.0 - 33.0 pg    MCHC 34.6 32.2 - 35.5 g/dL    RDW 43.4 35.9 - 50.0 fL    Platelet Count 300 164 - 446 K/uL    MPV 9.0 9.0 - 12.9 fL    Neutrophils-Polys 51.60 44.00 - 72.00 %    Lymphocytes 38.40 22.00 - 41.00 %    Monocytes 8.50 0.00 - 13.40 %    Eosinophils 0.90 0.00 - 6.90 %    Basophils 0.30 0.00 - 1.80 %    Immature Granulocytes 0.30 0.00 - 0.90 %    Nucleated RBC 0.00 0.00 - 0.20 /100 WBC    Neutrophils (Absolute) 3.35 1.82 - 7.42 K/uL    Lymphs (Absolute) 2.49 1.00 - 4.80 K/uL    Monos (Absolute) 0.55 0.00 - 0.85 K/uL    Eos (Absolute) 0.06 0.00 - 0.51 K/uL    Baso (Absolute) 0.02 0.00 - 0.12 K/uL    Immature Granulocytes (abs) 0.02 0.00 - 0.11 K/uL    NRBC (Absolute) 0.00 K/uL   Comp Metabolic Panel   Result Value Ref Range    Sodium 142 135 - 145 mmol/L    Potassium 3.0 (L) 3.6 - 5.5 mmol/L    Chloride 103 96 - 112 mmol/L    Co2 17 (L) 20 - 33 mmol/L    Anion Gap 22.0 (H) 7.0 - 16.0    Glucose 123 (H) 65 - 99 mg/dL    Bun 16 8 - 22 mg/dL    Creatinine 1.02 0.50 - 1.40 mg/dL    Calcium 8.8 8.5 - 10.5 mg/dL    Correct Calcium 8.3 (L) 8.5 - 10.5 mg/dL    AST(SGOT) 31 12 - 45 U/L    ALT(SGPT) 37 2 - 50 U/L    Alkaline Phosphatase 103 (H) 30 - 99 U/L    Total Bilirubin 0.2 0.1 - 1.5 mg/dL    Albumin 4.6 3.2 - 4.9 g/dL    Total Protein 7.7 6.0 - 8.2 g/dL    Globulin 3.1 1.9 - 3.5 g/dL    A-G Ratio 1.5 g/dL   ESTIMATED GFR   Result Value Ref Range    GFR (CKD-EPI) 62 >60 mL/min/1.73 m 2   EKG   Result Value Ref Range    Report       [facility] Emergency Dept.    Test Date:  2023-10-01  Pt Name:    MARANDA CESPEDES             Department:  ER  MRN:        [id number]                      Room:        21  Gender:     Female                       Technician: [id number]  :        1960                   Requested By:GERALDINE WELLS  Order #:    [id number]                    Reading MD: Geraldine Wells    Measurements  Intervals                                Axis  Rate:       92                           P:          84  SD:         184                          QRS:        -9  QRSD:       100                          T:          130  QT:         390  QTc:        483    Interpretive Statements  Sinus rhythm  LVH with secondary repolarization abnormality  Inferior infarct, old  Anterior infarct, old  Compared to ECG 2020 21:55:36  Left ventricular hypertrophy now present  Early repolarization now present  Myocardial infarct finding still present  Electronically Signed On 10- 02:19:20 PDT by Pedro Wells          RADIOLOGY  I have independently interpreted the diagnostic imaging associated with this visit and am waiting the final reading from the radiologist.   My preliminary interpretation is as follows: Hazy LL infiltrate  Radiologist interpretation:   CT-CTA NECK WITH & W/O-POST PROCESSING   Final Result         1.  CT angiogram of the neck within normal limits.   2.  Lucency surrounding left maxillary molar tooth roots suggesting changes related to odontogenic abscess.         DX-CHEST-PORTABLE (1 VIEW)   Final Result         1.  Hazy left lower lobe infiltrates.   2.  Atherosclerosis            COURSE & MEDICAL DECISION MAKING    ED Observation Status? Yes; I am placing the patient in to an observation status due to a diagnostic uncertainty as well as therapeutic intensity. Patient placed in observation status at 11:52 PM, 2023.     Observation plan is as follows: Behavioral health evaluation    INITIAL ASSESSMENT, COURSE AND PLAN  Care Narrative: This is a 63-year-old with a history of alcohol use and prior suicidal ideation who presents  with suicidal intent and intent to harm herself.  Patient was aggressive pre hospital and required chemical sedation prior to arrival and further history is limited.  Her labs here are overall reassuring.  She is hypokalemic and has an elevated alcohol level.  In an abundance of caution I did think it was safest to proceed with CTA to evaluate for dissection or stenosis in the setting of possible strangulation.  At time of transfer to CT patient became extremely aggressive was accusing me of trying to extort her money. She is denying hanging herself and says that she only tied the sheet around her neck.  At this point, I still do not think the patient has capacity to make decisions and given the unclear history surrounding the events I think it safest to proceed with CT. This was fortunately negative.   Of note, x-ray does show a hazy left lower lobe infiltrate .  She has been weaned off of supplemental oxygen here in the ER and has no other signs or symptoms consistent with infection at this time.  No indication for prophylactic treatment.  Patient is medically cleared for evaluation by behavioral health.  She has been placed on a legal hold.    DISPOSITION AND DISCUSSIONS  I have discussed management of the patient with the following physicians and YOAN's:  Dr. Peralta    Discussion of management with other \[facility]\"" or appropriate source(s): Behavioral Health          FINAL DIAGNOSIS  1. Suicidal ideation Acute   2. Suicide attempt by hanging, initial encounter (HCC) Acute   3. Alcoholic intoxication without complication (HCC) Acute          Electronically signed by: Geraldine Xiao M.D., 9/30/2023 11:49 PM

## 2023-10-01 NOTE — ED NOTES
Bedside report received from off going RN/tech: Josy, assumed care of patient.        Fall risk interventions in place: Place socks on patient, Keep floor surfaces clean and dry, and Accompanied to restroom (all applicable per [place] Fall risk assessment)   Continuous monitoring: Not Applicable   IVF/IV medications: Not Applicable   Oxygen: Room Air  Bedside sitter: Pt on L2k SI with 1:1 sitter Trixie (name), Report given to sitter, checklist completed, and checklist completed, stop sign in doorway  Isolation: Not Applicable

## 2023-10-01 NOTE — ED NOTES
VSS, no acute distress. Will continue to monitor. 1:1 sitter remains at bedside in direct view of pt for close observation

## 2023-10-01 NOTE — PROGRESS NOTES
"ED Observation Progress Note    Date of Service: 10/01/23    Interval History and Interventions  Patient here with suicidal ideation and attempt by hanging.  Patient CTA was reassuring.  Patient medically cleared by my partner.  Patient placed on legal hold, awaiting placement to psychiatric facility.  She required some chemical sedation initially but has been sleeping comfortably throughout my shift.      Physical Exam  /81   Pulse 72   Temp 36.3 °C (97.3 °F) (Temporal)   Resp 14   Ht 1.651 m (5' 5\")   Wt 86.2 kg (190 lb)   SpO2 90%   BMI 31.62 kg/m² .    Constitutional: Awake and alert. Nontoxic  HENT:  Grossly normal.   Eyes: Grossly normal  Neck: Normal range of motion, No crepitus. No bruising, no ttp  Cardiovascular: Normal heart rate   Thorax & Lungs: No respiratory distress  Abdomen: Nontender  Skin:  No pathologic rash.   Extremities: Well perfused  Psychiatric: Affect normal    Labs  Results for orders placed or performed during the hospital encounter of 09/30/23   Urine Drug Screen   Result Value Ref Range    Amphetamines Urine Negative Negative    Barbiturates Negative Negative    Benzodiazepines Positive (A) Negative    Cocaine Metabolite Negative Negative    Fentanyl, Urine Negative Negative    Methadone Negative Negative    Opiates Negative Negative    Oxycodone Negative Negative    Phencyclidine -Pcp Negative Negative    Propoxyphene Negative Negative    Cannabinoid Metab Negative Negative   DIAGNOSTIC ALCOHOL (BA)   Result Value Ref Range    Diagnostic Alcohol 218.2 (H) <10.1 mg/dL   CBC WITH DIFFERENTIAL   Result Value Ref Range    WBC 6.5 4.8 - 10.8 K/uL    RBC 4.98 4.20 - 5.40 M/uL    Hemoglobin 15.4 12.0 - 16.0 g/dL    Hematocrit 44.5 37.0 - 47.0 %    MCV 89.4 81.4 - 97.8 fL    MCH 30.9 27.0 - 33.0 pg    MCHC 34.6 32.2 - 35.5 g/dL    RDW 43.4 35.9 - 50.0 fL    Platelet Count 300 164 - 446 K/uL    MPV 9.0 9.0 - 12.9 fL    Neutrophils-Polys 51.60 44.00 - 72.00 %    Lymphocytes 38.40 " 22.00 - 41.00 %    Monocytes 8.50 0.00 - 13.40 %    Eosinophils 0.90 0.00 - 6.90 %    Basophils 0.30 0.00 - 1.80 %    Immature Granulocytes 0.30 0.00 - 0.90 %    Nucleated RBC 0.00 0.00 - 0.20 /100 WBC    Neutrophils (Absolute) 3.35 1.82 - 7.42 K/uL    Lymphs (Absolute) 2.49 1.00 - 4.80 K/uL    Monos (Absolute) 0.55 0.00 - 0.85 K/uL    Eos (Absolute) 0.06 0.00 - 0.51 K/uL    Baso (Absolute) 0.02 0.00 - 0.12 K/uL    Immature Granulocytes (abs) 0.02 0.00 - 0.11 K/uL    NRBC (Absolute) 0.00 K/uL   Comp Metabolic Panel   Result Value Ref Range    Sodium 142 135 - 145 mmol/L    Potassium 3.0 (L) 3.6 - 5.5 mmol/L    Chloride 103 96 - 112 mmol/L    Co2 17 (L) 20 - 33 mmol/L    Anion Gap 22.0 (H) 7.0 - 16.0    Glucose 123 (H) 65 - 99 mg/dL    Bun 16 8 - 22 mg/dL    Creatinine 1.02 0.50 - 1.40 mg/dL    Calcium 8.8 8.5 - 10.5 mg/dL    Correct Calcium 8.3 (L) 8.5 - 10.5 mg/dL    AST(SGOT) 31 12 - 45 U/L    ALT(SGPT) 37 2 - 50 U/L    Alkaline Phosphatase 103 (H) 30 - 99 U/L    Total Bilirubin 0.2 0.1 - 1.5 mg/dL    Albumin 4.6 3.2 - 4.9 g/dL    Total Protein 7.7 6.0 - 8.2 g/dL    Globulin 3.1 1.9 - 3.5 g/dL    A-G Ratio 1.5 g/dL   ESTIMATED GFR   Result Value Ref Range    GFR (CKD-EPI) 62 >60 mL/min/1.73 m 2   EKG   Result Value Ref Range    Report       [facility] Emergency Dept.    Test Date:  2023-10-01  Pt Name:    MARANDA CESPEDES             Department: ER  MRN:        [id number]                      Room:       BL 21  Gender:     Female                       Technician: [id number]  :        1960                   Requested By:GERALDINE WLELS  Order #:    [id number]                    Reading MD: Geraldine Wells    Measurements  Intervals                                Axis  Rate:       92                           P:          84  NY:         184                          QRS:        -9  QRSD:       100                          T:          130  QT:         390  QTc:        483    Interpretive  Statements  Sinus rhythm  LVH with secondary repolarization abnormality  Inferior infarct, old  Anterior infarct, old  Compared to ECG 06/12/2020 21:55:36  Left ventricular hypertrophy now present  Early repolarization now present  Myocardial infarct finding still present  Electronically Signed On 10- 02:19:20 PDT by Pedro Xiao         Radiology  CT-CTA NECK WITH & W/O-POST PROCESSING   Final Result         1.  CT angiogram of the neck within normal limits.   2.  Lucency surrounding left maxillary molar tooth roots suggesting changes related to odontogenic abscess.         DX-CHEST-PORTABLE (1 VIEW)   Final Result         1.  Hazy left lower lobe infiltrates.   2.  Atherosclerosis          Problem List    1. Suicidal ideation Acute   2. Suicide attempt by hanging, initial encounter (HCC) Acute   3. Alcoholic intoxication without complication (HCC) Acute         Electronically signed by: Matthew Calderon M.D., 10/1/2023 7:01 AM

## 2023-10-01 NOTE — ED NOTES
1:1 sitter remains at bedside in direct view of pt for close observation. Pt resting comfortably, respirations are regular and unlabored

## 2023-10-01 NOTE — ED NOTES
Rounded on patient in room and re- vitaled patient. Patents' vitals are within normal ranges and patient is in no apparent distress. Patent is in full view and is right side lying. Patient appears to be sleeping.

## 2023-10-01 NOTE — ED NOTES
Alert team at bedside to breathalyze, result is 0.100. Alert team to consult once pt is clinically sober. 1:1 sitter remains at bedside in direct view of pt for close observation

## 2023-10-01 NOTE — ED NOTES
Break RN: Pt ambulatory to restroom, urine collected and sent to lab. Pt returns to [facility], no O2 needed.

## 2023-10-01 NOTE — ED NOTES
RN rounded on pt. Pt awake and speaking to staff at this time. PT denying SI at this time. Pt informed RN that she had no intent to kill self during incident earlier today. Pt resting with even chest rise and fall, reports no needs at this time, call light available and in reach. Sitter remains bedside.

## 2023-10-01 NOTE — ED NOTES
Sitting on patient for patient safety. Vitals are within normal ranges and patient is in no apparent distress. Patient is sleeping on the gurney in the supine position. Patient is in full view and appears to have adequate chest rise and fall bilaterally.

## 2023-10-01 NOTE — ED NOTES
Bedside report to ANA Acuña. SI precautions continued, telesitter remains in direct view of pt. POC discussed.

## 2023-10-01 NOTE — ED TRIAGE NOTES
PT BIB EMS and escorted by security for   Chief Complaint   Patient presents with    Suicidal Ideation    Pt was found by [employer] while actively hanging self with a bed sheet from apartment [facility]. Pt reportedly had arm around bed sheet and therefore never had strain on neck. No ligature marks visible at this time. +etoh, was restrained with EMS upon arrival, trailed out of restraints with success. PT was given 5 of haldol, 5 versed by EMS. Pt current oxygen saturation 91% on 6L nasal cannula.

## 2023-10-01 NOTE — ED NOTES
Patient discharged home per ERP. X 1 bag of belongings returned from [address]. No meds in storage/belongings in safekeeping.   Discharge teaching and education discussed with patient. POC discussed.   Patient verbalized understanding of discharge teaching and education. No other questions at this time.   VSS. Patient alert and oriented. Patient arranged ride for self. Able to ambulate off unit safely with steady gait.

## 2023-10-01 NOTE — DISCHARGE SUMMARY
"  ED Observation Discharge Summary    Patient:Zainab Guy  Patient : 1960  Patient MRN: [id number]  Patient PCP: Marry Liu D.O.    Admit Date: 2023  Discharge Date and Time: 10/01/23 12:06 PM  Discharge Diagnosis: Cord compression, chronic suicidal thoughts  Discharge Attending: Matthew Calderon M.D.  Discharge Service: ED Observation    ED Course  Zainab is a 63 y.o. female who was evaluated at [facility] for suicide attempt.  Patient had some sheets around her neck but reports she was never actually hanging.  She had a work-up by my partner which was unremarkable.  Repeat exam is unremarkable.  Patient denies any ongoing SI, she reports this happens sometimes when she gets significantly intoxicated.  She has a longstanding history of recurrent suicidality but denies any current homicidal suicidal ideation and reports that if discharge she feels safe.  She has multiple resources as an outpatient, these were reviewed with her by our psychiatric nurse.  Psychiatric nurse has evaluated patient as well, they believe patient is safe for discharge home, patient reports that she really wants to get to work tomorrow and does not want to miss any days which I believe would be consistent with forward thinking.  Patient discharged.    Discharge Exam:  BP (!) 145/105   Pulse (!) 101   Temp 37.1 °C (98.7 °F) (Temporal)   Resp 18   Ht 1.651 m (5' 5\")   Wt 86.2 kg (190 lb)   SpO2 91%   BMI 31.62 kg/m² .    Constitutional: Awake and alert. Nontoxic  HENT:  Grossly normal  Eyes: Grossly normal  Neck: Normal range of motion  Cardiovascular: Normal heart rate   Thorax & Lungs: No respiratory distress  Abdomen: Nontender  Skin:  No pathologic rash.   Extremities: Well perfused  Psychiatric: Affect normal    Labs  Results for orders placed or performed during the hospital encounter of 23   Urine Drug Screen   Result Value Ref Range    Amphetamines Urine Negative Negative    Barbiturates Negative " Negative    Benzodiazepines Positive (A) Negative    Cocaine Metabolite Negative Negative    Fentanyl, Urine Negative Negative    Methadone Negative Negative    Opiates Negative Negative    Oxycodone Negative Negative    Phencyclidine -Pcp Negative Negative    Propoxyphene Negative Negative    Cannabinoid Metab Negative Negative   DIAGNOSTIC ALCOHOL (BA)   Result Value Ref Range    Diagnostic Alcohol 218.2 (H) <10.1 mg/dL   CBC WITH DIFFERENTIAL   Result Value Ref Range    WBC 6.5 4.8 - 10.8 K/uL    RBC 4.98 4.20 - 5.40 M/uL    Hemoglobin 15.4 12.0 - 16.0 g/dL    Hematocrit 44.5 37.0 - 47.0 %    MCV 89.4 81.4 - 97.8 fL    MCH 30.9 27.0 - 33.0 pg    MCHC 34.6 32.2 - 35.5 g/dL    RDW 43.4 35.9 - 50.0 fL    Platelet Count 300 164 - 446 K/uL    MPV 9.0 9.0 - 12.9 fL    Neutrophils-Polys 51.60 44.00 - 72.00 %    Lymphocytes 38.40 22.00 - 41.00 %    Monocytes 8.50 0.00 - 13.40 %    Eosinophils 0.90 0.00 - 6.90 %    Basophils 0.30 0.00 - 1.80 %    Immature Granulocytes 0.30 0.00 - 0.90 %    Nucleated RBC 0.00 0.00 - 0.20 /100 WBC    Neutrophils (Absolute) 3.35 1.82 - 7.42 K/uL    Lymphs (Absolute) 2.49 1.00 - 4.80 K/uL    Monos (Absolute) 0.55 0.00 - 0.85 K/uL    Eos (Absolute) 0.06 0.00 - 0.51 K/uL    Baso (Absolute) 0.02 0.00 - 0.12 K/uL    Immature Granulocytes (abs) 0.02 0.00 - 0.11 K/uL    NRBC (Absolute) 0.00 K/uL   Comp Metabolic Panel   Result Value Ref Range    Sodium 142 135 - 145 mmol/L    Potassium 3.0 (L) 3.6 - 5.5 mmol/L    Chloride 103 96 - 112 mmol/L    Co2 17 (L) 20 - 33 mmol/L    Anion Gap 22.0 (H) 7.0 - 16.0    Glucose 123 (H) 65 - 99 mg/dL    Bun 16 8 - 22 mg/dL    Creatinine 1.02 0.50 - 1.40 mg/dL    Calcium 8.8 8.5 - 10.5 mg/dL    Correct Calcium 8.3 (L) 8.5 - 10.5 mg/dL    AST(SGOT) 31 12 - 45 U/L    ALT(SGPT) 37 2 - 50 U/L    Alkaline Phosphatase 103 (H) 30 - 99 U/L    Total Bilirubin 0.2 0.1 - 1.5 mg/dL    Albumin 4.6 3.2 - 4.9 g/dL    Total Protein 7.7 6.0 - 8.2 g/dL    Globulin 3.1 1.9 - 3.5 g/dL     A-G Ratio 1.5 g/dL   ESTIMATED GFR   Result Value Ref Range    GFR (CKD-EPI) 62 >60 mL/min/1.73 m 2   POC BREATHALIZER   Result Value Ref Range    POC Breathalizer 0.012 (A) 0.00 - 0.01 Percent   EKG   Result Value Ref Range    Report       [facility] Emergency Dept.    Test Date:  2023-10-01  Pt Name:    MARANDA CESPEDES             Department: ER  MRN:        [id number]                      Room:        21  Gender:     Female                       Technician: [id number]  :        1960                   Requested By:GERALDINE WELLS  Order #:    [id number]                    Reading MD: Geraldine Wells    Measurements  Intervals                                Axis  Rate:       92                           P:          84  MT:         184                          QRS:        -9  QRSD:       100                          T:          130  QT:         390  QTc:        483    Interpretive Statements  Sinus rhythm  LVH with secondary repolarization abnormality  Inferior infarct, old  Anterior infarct, old  Compared to ECG 2020 21:55:36  Left ventricular hypertrophy now present  Early repolarization now present  Myocardial infarct finding still present  Electronically Signed On 10- 02:19:20 PDT by Pedro Wells         Radiology  CT-CTA NECK WITH & W/O-POST PROCESSING   Final Result         1.  CT angiogram of the neck within normal limits.   2.  Lucency surrounding left maxillary molar tooth roots suggesting changes related to odontogenic abscess.         DX-CHEST-PORTABLE (1 VIEW)   Final Result         1.  Hazy left lower lobe infiltrates.   2.  Atherosclerosis          Medications:   New Prescriptions    No medications on file       My final assessment includes   1. Suicidal ideation Chronic   2. Suicide attempt by hanging, initial encounter (HCC) Acute   3. Alcoholic intoxication without complication (HCC) Acute       Upon Reevaluation, the patient's condition has: Improved; and will be  discharged.    Patient discharged from ED Observation status at 12:07 PM (Time) 10/01/23 (Date).     Total time spent on this ED Observation discharge encounter is > 30 Minutes    Electronically signed by: Matthew Calderon M.D., 10/1/2023 12:06 PM

## 2023-10-01 NOTE — ED NOTES
Bedside report received from ANA Acuña. Fall risk precautions in place. Legal hold discontinued by ERP. Call bell in reach. Pt on room air. 1:1 safety sitter discontinued.

## 2023-11-29 ENCOUNTER — HOSPITAL ENCOUNTER (EMERGENCY)
Facility: MEDICAL CENTER | Age: 63
End: 2023-11-29
Attending: EMERGENCY MEDICINE
Payer: COMMERCIAL

## 2023-11-29 ENCOUNTER — APPOINTMENT (OUTPATIENT)
Dept: RADIOLOGY | Facility: MEDICAL CENTER | Age: 63
End: 2023-11-29
Attending: EMERGENCY MEDICINE
Payer: COMMERCIAL

## 2023-11-29 VITALS
HEART RATE: 66 BPM | WEIGHT: 195.11 LBS | TEMPERATURE: 98.4 F | HEIGHT: 65 IN | OXYGEN SATURATION: 96 % | DIASTOLIC BLOOD PRESSURE: 107 MMHG | BODY MASS INDEX: 32.51 KG/M2 | RESPIRATION RATE: 16 BRPM | SYSTOLIC BLOOD PRESSURE: 172 MMHG

## 2023-11-29 DIAGNOSIS — I10 PRIMARY HYPERTENSION: ICD-10-CM

## 2023-11-29 DIAGNOSIS — R07.9 ACUTE CHEST PAIN: ICD-10-CM

## 2023-11-29 LAB
ALBUMIN SERPL BCP-MCNC: 4.4 G/DL (ref 3.2–4.9)
ALBUMIN/GLOB SERPL: 1.4 G/DL
ALP SERPL-CCNC: 79 U/L (ref 30–99)
ALT SERPL-CCNC: 41 U/L (ref 2–50)
ANION GAP SERPL CALC-SCNC: 11 MMOL/L (ref 7–16)
AST SERPL-CCNC: 29 U/L (ref 12–45)
BASOPHILS # BLD AUTO: 0.5 % (ref 0–1.8)
BASOPHILS # BLD: 0.03 K/UL (ref 0–0.12)
BILIRUB SERPL-MCNC: 0.4 MG/DL (ref 0.1–1.5)
BUN SERPL-MCNC: 14 MG/DL (ref 8–22)
CALCIUM ALBUM COR SERPL-MCNC: 9.4 MG/DL (ref 8.5–10.5)
CALCIUM SERPL-MCNC: 9.7 MG/DL (ref 8.4–10.2)
CHLORIDE SERPL-SCNC: 105 MMOL/L (ref 96–112)
CO2 SERPL-SCNC: 25 MMOL/L (ref 20–33)
CREAT SERPL-MCNC: 0.63 MG/DL (ref 0.5–1.4)
EKG IMPRESSION: NORMAL
EOSINOPHIL # BLD AUTO: 0.08 K/UL (ref 0–0.51)
EOSINOPHIL NFR BLD: 1.3 % (ref 0–6.9)
ERYTHROCYTE [DISTWIDTH] IN BLOOD BY AUTOMATED COUNT: 42.7 FL (ref 35.9–50)
GFR SERPLBLD CREATININE-BSD FMLA CKD-EPI: 100 ML/MIN/1.73 M 2
GLOBULIN SER CALC-MCNC: 3.1 G/DL (ref 1.9–3.5)
GLUCOSE SERPL-MCNC: 97 MG/DL (ref 65–99)
HCT VFR BLD AUTO: 46 % (ref 37–47)
HGB BLD-MCNC: 15.7 G/DL (ref 12–16)
IMM GRANULOCYTES # BLD AUTO: 0.02 K/UL (ref 0–0.11)
IMM GRANULOCYTES NFR BLD AUTO: 0.3 % (ref 0–0.9)
LYMPHOCYTES # BLD AUTO: 1.65 K/UL (ref 1–4.8)
LYMPHOCYTES NFR BLD: 26 % (ref 22–41)
MCH RBC QN AUTO: 30.1 PG (ref 27–33)
MCHC RBC AUTO-ENTMCNC: 34.1 G/DL (ref 32.2–35.5)
MCV RBC AUTO: 88.1 FL (ref 81.4–97.8)
MONOCYTES # BLD AUTO: 0.56 K/UL (ref 0–0.85)
MONOCYTES NFR BLD AUTO: 8.8 % (ref 0–13.4)
NEUTROPHILS # BLD AUTO: 4.01 K/UL (ref 1.82–7.42)
NEUTROPHILS NFR BLD: 63.1 % (ref 44–72)
NRBC # BLD AUTO: 0 K/UL
NRBC BLD-RTO: 0 /100 WBC (ref 0–0.2)
PLATELET # BLD AUTO: 266 K/UL (ref 164–446)
PMV BLD AUTO: 8.8 FL (ref 9–12.9)
POTASSIUM SERPL-SCNC: 4 MMOL/L (ref 3.6–5.5)
PROT SERPL-MCNC: 7.5 G/DL (ref 6–8.2)
RBC # BLD AUTO: 5.22 M/UL (ref 4.2–5.4)
SODIUM SERPL-SCNC: 141 MMOL/L (ref 135–145)
TROPONIN T SERPL-MCNC: 10 NG/L (ref 6–19)
WBC # BLD AUTO: 6.4 K/UL (ref 4.8–10.8)

## 2023-11-29 PROCEDURE — 700102 HCHG RX REV CODE 250 W/ 637 OVERRIDE(OP): Performed by: EMERGENCY MEDICINE

## 2023-11-29 PROCEDURE — 80053 COMPREHEN METABOLIC PANEL: CPT

## 2023-11-29 PROCEDURE — 71045 X-RAY EXAM CHEST 1 VIEW: CPT

## 2023-11-29 PROCEDURE — 99285 EMERGENCY DEPT VISIT HI MDM: CPT

## 2023-11-29 PROCEDURE — 85025 COMPLETE CBC W/AUTO DIFF WBC: CPT

## 2023-11-29 PROCEDURE — 36415 COLL VENOUS BLD VENIPUNCTURE: CPT

## 2023-11-29 PROCEDURE — 96374 THER/PROPH/DIAG INJ IV PUSH: CPT

## 2023-11-29 PROCEDURE — 700111 HCHG RX REV CODE 636 W/ 250 OVERRIDE (IP): Performed by: EMERGENCY MEDICINE

## 2023-11-29 PROCEDURE — 93005 ELECTROCARDIOGRAM TRACING: CPT

## 2023-11-29 PROCEDURE — 93005 ELECTROCARDIOGRAM TRACING: CPT | Performed by: EMERGENCY MEDICINE

## 2023-11-29 PROCEDURE — A9270 NON-COVERED ITEM OR SERVICE: HCPCS | Performed by: EMERGENCY MEDICINE

## 2023-11-29 PROCEDURE — 84484 ASSAY OF TROPONIN QUANT: CPT

## 2023-11-29 RX ORDER — ASPIRIN 81 MG/1
324 TABLET, CHEWABLE ORAL ONCE
Status: COMPLETED | OUTPATIENT
Start: 2023-11-29 | End: 2023-11-29

## 2023-11-29 RX ORDER — BENAZEPRIL HYDROCHLORIDE 20 MG/1
20 TABLET ORAL DAILY
Qty: 30 TABLET | Refills: 0 | Status: SHIPPED | OUTPATIENT
Start: 2023-11-29 | End: 2023-12-08 | Stop reason: SDUPTHER

## 2023-11-29 RX ORDER — ACETAMINOPHEN 500 MG
TABLET ORAL EVERY 6 HOURS PRN
COMMUNITY

## 2023-11-29 RX ORDER — LABETALOL HYDROCHLORIDE 5 MG/ML
20 INJECTION, SOLUTION INTRAVENOUS ONCE
Status: COMPLETED | OUTPATIENT
Start: 2023-11-29 | End: 2023-11-29

## 2023-11-29 RX ORDER — HYDROCHLOROTHIAZIDE 25 MG/1
25 TABLET ORAL DAILY
Qty: 30 TABLET | Refills: 0 | Status: SHIPPED | OUTPATIENT
Start: 2023-11-29 | End: 2023-12-08 | Stop reason: SDUPTHER

## 2023-11-29 RX ADMIN — LABETALOL HYDROCHLORIDE 20 MG: 5 INJECTION, SOLUTION INTRAVENOUS at 13:47

## 2023-11-29 RX ADMIN — ASPIRIN 324 MG: 81 TABLET, CHEWABLE ORAL at 13:46

## 2023-11-29 ASSESSMENT — FIBROSIS 4 INDEX: FIB4 SCORE: 1.04

## 2023-11-29 NOTE — ED NOTES
Patient is stable for discharge at this time, anticipatory guidance provided, close follow-up is encouraged, and ED return instructions have been detailed. Patient is both agreeable to the disposition and plan and discharged home in ambulatory state and in good condition.      Rx education provided, Pt verbalized understanding.     Patient signed AMA form at discharge for denying admission. Patient verbalized understanding of all education and demonstrates understanding to follow up out patient for stress test.

## 2023-11-29 NOTE — ED PROVIDER NOTES
ED Provider Note    CHIEF COMPLAINT  Chief Complaint   Patient presents with    Chest Pain     Started within the hour  Pt reports the pain starts mid chest and radiates out across the chest   Pt reports that the pain is also in her jaw. Pt states that it has been intermittent since starting        EXTERNAL RECORDS REVIEWED  Inpatient Notes   and Outpatient Notes patient was seen in the September for suicidal ideation.  Blood pressure at that time was elevated.    HPI/ROS      Zainab Guy is a 63 y.o. female who presents complaining of chest pain.  She has a discomfort in the mid level of chest that radiates around the chest.  Goes up to her jaw.  Onset was while riding in a car.  She states that the severity of the pain is diminished but she still has an ache in the center of her chest, she thought it might be indigestion.  She has had this a few years ago, it sounds like he had a stress test at that time which was unrevealing.  Symptoms were attributed to stress at that time.  She is not under any stress presently.  She denies any fever or chills.  No leg pain or swelling.  No recent trips or travel.  She is in therapy presently.  Feels like things are really good on a mental health standpoint.  She has a long history of hypertension, it sounds like at 1 point she was on an ACE inhibitor as well as a diuretic.  She is been off of that for several months.  She also supposed to medicine for cholesterol.  There is no other complaint.  She does not describe a pleuritic pain.  She does not describe a tearing sensation.  She has a remote history of methamphetamine use.  She does smoke tobacco.    PAST MEDICAL HISTORY   has a past medical history of Alcohol abuse, GERD (gastroesophageal reflux disease), HTN (hypertension), Hypercholesteremia, Hypothyroidism, Obesity, and Osteoarthritis.    SURGICAL HISTORY   has a past surgical history that includes orif, knee (Bilateral) and abdominal hysterectomy  "total.    FAMILY HISTORY  Family History   Problem Relation Age of Onset    Diabetes Mother     Hypertension Mother     Hypertension Father        SOCIAL HISTORY  Social History     Tobacco Use    Smoking status: Every Day     Current packs/day: 0.00     Average packs/day: 0.5 packs/day for 25.0 years (12.5 ttl pk-yrs)     Types: Cigarettes     Start date: 10/1979     Last attempt to quit: 10/2004     Years since quittin.1    Smokeless tobacco: Never   Vaping Use    Vaping Use: Not on file   Substance and Sexual Activity    Alcohol use: Yes     Alcohol/week: 3.6 oz     Types: 6 Shots of liquor per week    Drug use: No    Sexual activity: Not Currently     Birth control/protection: Post-Menopausal       CURRENT MEDICATIONS  Home Medications    **Home medications have not yet been reviewed for this encounter**         ALLERGIES  No Known Allergies    PHYSICAL EXAM  VITAL SIGNS: BP (!) 216/123   Pulse 79   Temp 36.6 °C (97.9 °F) (Temporal)   Resp (!) 22   Ht 1.651 m (5' 5\")   Wt 88.5 kg (195 lb 1.7 oz)   SpO2 94%   BMI 32.47 kg/m²    Constitutional: Well appearing patient in no acute distress.  HENT: Head is without trauma.  Oropharynx is clear.  Mucous membranes are moist.  Eyes: Sclerae are nonicteric, pupils are equally round.  Neck: Supple with grossly normal range of motion.  Cardiovascular: Heart is regular rate and rhythm without murmur rub or gallop.  Peripheral pulses are intact and symmetric throughout.  Thorax & Lungs: Breathing easily.  Good air movement.  There is no wheeze, rhonchi or rales.  Abdomen: Bowel sounds normal, soft, non-distended, nontender, no mass nor pulsatile mass. I do not appreciate hepatosplenomegaly.  Skin: No apparent rash.  I do not see petechiae or purpura.  Extremities: No evidence of acute trauma.  No clubbing, cyanosis, edema, no Homans or cords.  Neurologic: Alert. Moving all extremities.  Intact sensation and strength throughout.  Gait is normal.  Psychiatric: " Normal for situation      DIAGNOSTIC STUDIES / PROCEDURES  EKG  I have independently interpreted this EKG  This is a twelve-lead study to my interpretation shows a normal sinus rhythm at a rate of 77.  There are anterior lateral ST-T wave changes.  No ST elevation.  Impression: No STEMI    LABS  Labs Reviewed   CBC WITH DIFFERENTIAL - Abnormal; Notable for the following components:       Result Value    MPV 8.8 (*)     All other components within normal limits    Narrative:     Biotin intake of greater than 5 mg per day may interfere with  troponin levels, causing false low values.   COMP METABOLIC PANEL    Narrative:     Biotin intake of greater than 5 mg per day may interfere with  troponin levels, causing false low values.   TROPONIN    Narrative:     Biotin intake of greater than 5 mg per day may interfere with  troponin levels, causing false low values.   ESTIMATED GFR    Narrative:     Biotin intake of greater than 5 mg per day may interfere with  troponin levels, causing false low values.         RADIOLOGY  I have independently interpreted the diagnostic imaging associated with this visit and am waiting the final reading from the radiologist.   My preliminary interpretation is as follows: No infiltrate.  No change in prior  Radiologist interpretation:   DX-CHEST-PORTABLE (1 VIEW)   Final Result      No evidence of acute cardiopulmonary process.            COURSE & MEDICAL DECISION MAKING    ED Observation Status? Yes; I am placing the patient in to an observation status due to a diagnostic uncertainty as well as therapeutic intensity. Patient placed in observation status at 1:40 PM, 11/29/2023.     Observation plan is as follows: We will check laboratories, troponin to help her stratify.    Upon Reevaluation, the patient's condition has: Not improved however she will be leaving against advice    Patient discharged from ED Observation status at 1500 (Time) 11/29/2023  (Date).     INITIAL ASSESSMENT, COURSE AND  PLAN  Care Narrative: This is a patient who presents with chest pain.  She has cardiovascular is factors to include hypertension, dyslipidemia, age and tobacco use.  She has a markedly elevated blood pressure right now.  The quality of her symptoms does not sound like pulmonary embolism or aortic etiology.  Her EKG shows some nonspecific changes.  No evidence of a STEMI.  Will give her some aspirin.  Will give her labetalol.  Will see her response to this.      Her heart score is calculated to be moderate risk.      Patient is completely asymptomatic after her blood pressure comes down with the labetalol as well as aspirin.  She is still hypertensive but her systolic pressure is down 60 points and her diastolic is down 46 points.    Laboratories returned showing no leukocytosis.  Basic chemistries are normal.  LFTs are normal.  Troponin is in the normal range at 10.    I have gone back into talk to the patient.  I discussed with her I am concerned that her high blood pressure could be precipitating her chest pain, which by definition will be unstable angina.  I like to put her in the hospital for serial troponins and risk stratification.  Ongoing blood pressure control.  However she would like to go home.    The patient is leaving against medical advice.  I discussed with the patient the risks of leaving without receiving appropriate care to include permanent disability or death.  I have discussed possible alternatives.  The patient is not intoxicated.  The patient is a capable decision maker and understands the risks and benefits of their decision.  While I certainly disagree with the patient's decision, I respect the patient's autonomy and will not keep them here against their will.  They understand that their decision to leave can be reversed at any time and they can return to us at any time for any reason at all.   I have asked the nurses to have the patient sign an AMA form and to witness this signature.    I  will go ahead and put her back on benazepril and hydrochlorothiazide.  I have also recommend she take a daily aspirin until told otherwise by her cardiologist.  I have left a message as well as put an order in the computer for the  to help arrange for cardiology follow-up as well as the primary care doctor.    Instructions on hypertension and chest pain.        DISPOSITION AND DISCUSSIONS    Escalation of care considered, and ultimately not performed:after discussion with the patient / family, they have elected to decline an escalation in care      Decision tools and prescription drugs considered including, but not limited to: HEART Score   .    FINAL DIAGNOSIS  1. Acute chest pain    2. Primary hypertension    3.  Leaving AGAINST MEDICAL ADVICE       Electronically signed by: Ehsan Price M.D., 11/29/2023 1:36 PM

## 2023-11-29 NOTE — ED TRIAGE NOTES
"Chief Complaint   Patient presents with    Chest Pain     Started within the hour  Pt reports the pain starts mid chest and radiates out across the chest   Pt reports that the pain is also in her jaw. Pt states that it has been intermittent since starting      BP (!) 236/153   Pulse 79   Temp 36.6 °C (97.9 °F) (Temporal)   Resp (!) 22   Ht 1.651 m (5' 5\")   Wt 88.5 kg (195 lb 1.7 oz)   SpO2 94%   BMI 32.47 kg/m²     "

## 2023-11-29 NOTE — DISCHARGE INSTRUCTIONS
Start BP medicine.  Until told otherwise by the heart doctor, I recommend you take a daily baby aspirin.  Follow up ASAP with cardiology--I called and left a message with the , they should call you in next day or so. If not, go ahead and schedule yourself by calling the number above.  You can always come back if you change your mind or for any concern at any time.

## 2023-11-29 NOTE — ED NOTES
Medication history reviewed with pt. Med rec is complete.  Allergies reviewed, per pt    Pt reports no prescription medications or vitamins in the last 30 days or longer.    Patient has not had any outpatient antibiotics in the last 30 days.    Pt is not on any anticoagulants

## 2023-12-08 ENCOUNTER — OFFICE VISIT (OUTPATIENT)
Dept: CARDIOLOGY | Facility: MEDICAL CENTER | Age: 63
End: 2023-12-08
Attending: EMERGENCY MEDICINE
Payer: COMMERCIAL

## 2023-12-08 VITALS
RESPIRATION RATE: 16 BRPM | WEIGHT: 191.6 LBS | HEART RATE: 96 BPM | HEIGHT: 65 IN | SYSTOLIC BLOOD PRESSURE: 140 MMHG | OXYGEN SATURATION: 94 % | BODY MASS INDEX: 31.92 KG/M2 | DIASTOLIC BLOOD PRESSURE: 94 MMHG

## 2023-12-08 DIAGNOSIS — R07.9 CHEST PAIN, UNSPECIFIED TYPE: ICD-10-CM

## 2023-12-08 DIAGNOSIS — I10 PRIMARY HYPERTENSION: ICD-10-CM

## 2023-12-08 DIAGNOSIS — R73.01 IMPAIRED FASTING GLUCOSE: ICD-10-CM

## 2023-12-08 DIAGNOSIS — I77.810 ASCENDING AORTA DILATATION (HCC): ICD-10-CM

## 2023-12-08 DIAGNOSIS — I11.9 HYPERTENSIVE LEFT VENTRICULAR HYPERTROPHY, WITHOUT HEART FAILURE: ICD-10-CM

## 2023-12-08 DIAGNOSIS — E78.5 DYSLIPIDEMIA: ICD-10-CM

## 2023-12-08 DIAGNOSIS — I10 ESSENTIAL HYPERTENSION: ICD-10-CM

## 2023-12-08 DIAGNOSIS — E78.2 MIXED HYPERLIPIDEMIA: ICD-10-CM

## 2023-12-08 DIAGNOSIS — R94.31 NONSPECIFIC ABNORMAL ELECTROCARDIOGRAM (ECG) (EKG): ICD-10-CM

## 2023-12-08 PROCEDURE — 3080F DIAST BP >= 90 MM HG: CPT | Performed by: NURSE PRACTITIONER

## 2023-12-08 PROCEDURE — 99204 OFFICE O/P NEW MOD 45 MIN: CPT | Performed by: NURSE PRACTITIONER

## 2023-12-08 PROCEDURE — 99211 OFF/OP EST MAY X REQ PHY/QHP: CPT | Performed by: NURSE PRACTITIONER

## 2023-12-08 PROCEDURE — 3077F SYST BP >= 140 MM HG: CPT | Performed by: NURSE PRACTITIONER

## 2023-12-08 RX ORDER — BENAZEPRIL HYDROCHLORIDE 40 MG/1
40 TABLET ORAL DAILY
Qty: 90 TABLET | Refills: 1 | Status: SHIPPED | OUTPATIENT
Start: 2023-12-08

## 2023-12-08 RX ORDER — ATORVASTATIN CALCIUM 80 MG/1
80 TABLET, FILM COATED ORAL NIGHTLY
Qty: 90 TABLET | Refills: 3 | Status: SHIPPED | OUTPATIENT
Start: 2023-12-08

## 2023-12-08 RX ORDER — HYDROCHLOROTHIAZIDE 25 MG/1
25 TABLET ORAL DAILY
Qty: 90 TABLET | Refills: 1 | Status: SHIPPED | OUTPATIENT
Start: 2023-12-08

## 2023-12-08 RX ORDER — ASPIRIN 81 MG/1
81 TABLET ORAL DAILY
Qty: 100 TABLET | Refills: 3 | COMMUNITY
Start: 2023-12-08

## 2023-12-08 ASSESSMENT — FIBROSIS 4 INDEX: FIB4 SCORE: 1.04

## 2023-12-08 NOTE — PROGRESS NOTES
Chief Complaint   Patient presents with    Chest Pain       Subjective     Zainab Guy is a 63 y.o. female who presents today for chest pain follow-up after leaving the ER AMA after recommendation for stress testing.  Patient has additional medical problems of dilated ascending aorta, hypertension, metabolic fatty liver disease, hypothyroid, impaired fasting glucose.     Cardiovascular Risk Factors:  1. Smoking status: yes  2. Type II Diabetes Mellitus: no recent A1C, defer to PCP   Lab Results   Component Value Date/Time    HBA1C 5.8 (H) 05/28/2020 08:04 PM    HBA1C 5.4 07/29/2019 02:33 PM     3. Hypertension: present  4. Dyslipidemia: present   Cholesterol,Tot   Date Value Ref Range Status   08/06/2019 339 (H) 100 - 199 mg/dL Final     LDL   Date Value Ref Range Status   08/06/2019 see below <100 mg/dL Final     Comment:     The calculated LDL value is invalid due to the triglyceride  value of >400 mg/dL.       HDL   Date Value Ref Range Status   08/06/2019 see below >=40 mg/dL Corrected     Comment:     Unable to obtain HDL result due to extremely lipemic sample with  triglyceride value >1000 mg/dL.  Corrected result; previously reported as 27 on 08/06/19 at 16:41       Triglycerides   Date Value Ref Range Status   08/06/2019 1235 (H) 0 - 149 mg/dL Final     Comment:     Results obtained by dilution.     5. Family history of early Coronary Artery Disease in a first degree relative (Male less than 55 years of age; Female less than 65 years of age): Grandfather dies of cerebral aneurism  6.  Obesity and/or Metabolic Syndrome: BMI  31  7. Sedentary lifestyle: present  8. Substance Abuse (ETOH, Caffine, Recreational substances): occasional energy drinks, 3-4 drinks of etoh at a time occasionally, denies recreational substances  9. Hx of CKD or autoimmune diseases (lupus or RA): not present    Today, patient denies recurrence of her chest pain.  She describes original chest pain as midsternal pressure,  radiates laterally and up to her jaw.  Patient was also in hypertensive urgency while experiencing chest pain.  Patient BAL is also elevated.  Otherwise, Patient  denies shortness of breath, palpitations, dizziness/lightheadedness, orthopnea, PND or Edema.     EKG from previous ER evaluations reviewed in office today notable for LVH with repolarization abnormality, previous stress testing and echocardiogram reviewed per below.    Blood pressure is elevated in office despite resuming antihypertensive medications.  We will optimize medical therapy per below.  We also discussed mixed hyperlipidemia and initiating aspirin and Plavix for elevated ASCVD risk.  We will obtain stress testing.  We will also obtain echocardiogram to reevaluate her ascending aorta diameter as well as systolic murmur on exam.    Past Medical History:   Diagnosis Date    Alcohol abuse     GERD (gastroesophageal reflux disease)     HTN (hypertension)     Hypercholesteremia     Hypothyroidism     Obesity     Osteoarthritis      Past Surgical History:   Procedure Laterality Date    ABDOMINAL HYSTERECTOMY TOTAL      ORIF, KNEE Bilateral      Family History   Problem Relation Age of Onset    Diabetes Mother     Hypertension Mother     Hypertension Father      Social History     Socioeconomic History    Marital status: Single     Spouse name: Not on file    Number of children: Not on file    Years of education: Not on file    Highest education level: Not on file   Occupational History    Not on file   Tobacco Use    Smoking status: Every Day     Current packs/day: 0.00     Average packs/day: 0.5 packs/day for 25.0 years (12.5 ttl pk-yrs)     Types: Cigarettes     Start date: 10/1979     Last attempt to quit: 10/2004     Years since quittin.1    Smokeless tobacco: Never   Vaping Use    Vaping Use: Not on file   Substance and Sexual Activity    Alcohol use: Yes     Alcohol/week: 3.6 oz     Types: 6 Shots of liquor per week    Drug use: No    Sexual  "activity: Not Currently     Birth control/protection: Post-Menopausal   Other Topics Concern    Not on file   Social History Narrative    Not on file     Social Determinants of Health     Financial Resource Strain: Not on file   Food Insecurity: Not on file   Transportation Needs: Not on file   Physical Activity: Not on file   Stress: Not on file   Social Connections: Not on file   Intimate Partner Violence: Not on file   Housing Stability: Not on file     No Known Allergies  Outpatient Encounter Medications as of 12/8/2023   Medication Sig Dispense Refill    benazepril (LOTENSIN) 40 MG tablet Take 1 Tablet by mouth every day. 90 Tablet 1    hydroCHLOROthiazide 25 MG Tab Take 1 Tablet by mouth every day. 90 Tablet 1    aspirin 81 MG EC tablet Take 1 Tablet by mouth every day. 100 Tablet 3    atorvastatin (LIPITOR) 80 MG tablet Take 1 Tablet by mouth every evening. 90 Tablet 3    acetaminophen (TYLENOL) 500 MG Tab Take 1,000-1,500 mg by mouth every 6 hours as needed. Indications: Pain      [DISCONTINUED] hydroCHLOROthiazide 25 MG Tab Take 1 Tablet by mouth every day. 30 Tablet 0    [DISCONTINUED] benazepril (LOTENSIN) 20 MG Tab Take 1 Tablet by mouth every day. 30 Tablet 0     No facility-administered encounter medications on file as of 12/8/2023.     ROS Complete review of systems negative except as noted in HPI/subjective           Objective     BP (!) 140/94 (BP Location: Left arm, Patient Position: Sitting, BP Cuff Size: Adult)   Pulse 96   Resp 16   Ht 1.651 m (5' 5\")   Wt 86.9 kg (191 lb 9.6 oz)   SpO2 94%   BMI 31.88 kg/m²     Physical Exam  Vitals reviewed.   Constitutional:       Appearance: She is well-developed.   HENT:      Head: Normocephalic and atraumatic.   Eyes:      Pupils: Pupils are equal, round, and reactive to light.   Neck:      Vascular: No JVD.   Cardiovascular:      Rate and Rhythm: Normal rate and regular rhythm.      Heart sounds: Murmur heard.      Systolic murmur is present with a " grade of 2/6.      No friction rub. No gallop.   Pulmonary:      Effort: Pulmonary effort is normal. No respiratory distress.      Breath sounds: Normal breath sounds.   Abdominal:      General: There is no distension.      Palpations: Abdomen is soft.   Musculoskeletal:      Right lower leg: No edema.      Left lower leg: No edema.   Skin:     General: Skin is warm and dry.      Findings: No erythema.   Neurological:      General: No focal deficit present.      Mental Status: She is alert and oriented to person, place, and time.   Psychiatric:         Behavior: Behavior normal.            Lab Results   Component Value Date/Time    CHOLSTRLTOT 339 (H) 08/06/2019 12:21 PM    LDL see below 08/06/2019 12:21 PM    HDL see below 08/06/2019 12:21 PM    TRIGLYCERIDE 1235 (H) 08/06/2019 12:21 PM       Lab Results   Component Value Date/Time    SODIUM 141 11/29/2023 01:25 PM    POTASSIUM 4.0 11/29/2023 01:25 PM    CHLORIDE 105 11/29/2023 01:25 PM    CO2 25 11/29/2023 01:25 PM    GLUCOSE 97 11/29/2023 01:25 PM    BUN 14 11/29/2023 01:25 PM    CREATININE 0.63 11/29/2023 01:25 PM     Lab Results   Component Value Date/Time    ALKPHOSPHAT 79 11/29/2023 01:25 PM    ASTSGOT 29 11/29/2023 01:25 PM    ALTSGPT 41 11/29/2023 01:25 PM    TBILIRUBIN 0.4 11/29/2023 01:25 PM    TTE CONCLUSIONS (11/2018):  Normal left ventricular systolic function.  Left ventricular ejection fraction is visually estimated to be 65%.  Mild aortic stenosis.  Unable to estimate pulmonary artery pressure due to an inadequate   tricuspid regurgitant jet.  Ascending aorta diameter is dilated at 3.9 cm.  No prior study is available for comparison.     NM-Stress (6/2020):  NUCLEAR IMAGING INTERPRETATION   No evidence of significant jeopardized viable myocardium or prior myocardial    infarction.   Normal left ventricular size, ejection fraction, and wall motion.   ECG INTERPRETATION   No ischemic changes from baseline ECG. Ventricular couplet with  exercise.    Assessment & Plan     1. Chest pain, unspecified type  Referral to establish with PCP    aspirin 81 MG EC tablet    atorvastatin (LIPITOR) 80 MG tablet    [id number] PET W/FLOW RESERVE    EC-ECHOCARDIOGRAM COMPLETE W/O CONT      2. Dyslipidemia  Referral to establish with PCP    aspirin 81 MG EC tablet    atorvastatin (LIPITOR) 80 MG tablet    [id number] PET W/FLOW RESERVE    EC-ECHOCARDIOGRAM COMPLETE W/O CONT      3. Essential hypertension  Referral to establish with PCP    aspirin 81 MG EC tablet    atorvastatin (LIPITOR) 80 MG tablet    [id number] PET W/FLOW RESERVE    EC-ECHOCARDIOGRAM COMPLETE W/O CONT      4. Primary hypertension  benazepril (LOTENSIN) 40 MG tablet    hydroCHLOROthiazide 25 MG Tab    Referral to establish with PCP    aspirin 81 MG EC tablet    atorvastatin (LIPITOR) 80 MG tablet    [id number] PET W/FLOW RESERVE    EC-ECHOCARDIOGRAM COMPLETE W/O CONT      5. Mixed hyperlipidemia  [id number] PET W/FLOW RESERVE    EC-ECHOCARDIOGRAM COMPLETE W/O CONT      6. Ascending aorta dilatation (HCC)  [id number] PET W/FLOW RESERVE    EC-ECHOCARDIOGRAM COMPLETE W/O CONT      7. Nonspecific abnormal electrocardiogram (ECG) (EKG)  [id number] PET W/FLOW RESERVE    EC-ECHOCARDIOGRAM COMPLETE W/O CONT      8. Hypertensive left ventricular hypertrophy, without heart failure        9. Impaired fasting glucose            Medical Decision Making: Today's Assessment/Status/Plan:        -NM cardiac PET, echocardiogram ordered  -Increase benazepril to 40 mg daily, continue HCTZ 25 mg daily.  Discussed goal blood pressure <130/80.  Instructions provided in after visit summary  -Initiate ASA 81 mg daily, atorvastatin 80 mg every evening.  Recheck lipids in 3 months  -Patient established with PCP, referral placed.    Patient to follow-up after diagnostic testing for additional recommendations.  Sooner if needed.    Patient verbalizes understanding and agrees with the  plan of care.     FILIBERTO Mon, HF-Cert   [facility]  [phone]    PLEASE NOTE: This Note was created using voice recognition Software. I have made every reasonable attempt to correct obvious errors, but I expect that there are errors of grammar and possibly content that I did not discover before finalizing the note

## 2023-12-08 NOTE — PATIENT INSTRUCTIONS
Checking Blood Pressure:  -Blood pressure cuff, spend in the $40-65, with good return policy  -It should be automatic, upper arm, measure your arm to get the correct size, probably adult Large  -Put the cuff in place, rest arm on table near height of your heart, sit quietly for 5 min, legs uncrossed, with back support, then take your blood pressure, write it down, keep a log  -Check once a day. Ideally, 2 hours after medications.  -Can bring your cuff to at least one appointment where it can be calibrated to a manual cuff if you are concerned.  -Goal blood pressure is at least under 130/80, ideally under 120/80.  If you think your BP is overall too high, let us know in the office, we can adjust medications, can use [employer] or call the [facility] office: [phone].  -If you feel dizzy, please check your blood pressure.  If your blood pressure is less than 90/60 with dizziness call our office at [phone].    -Continue to monitor blood pressures, heart rates, and daily weights in log provided in office today. Please bring to next appointment to review with provider.     Salt=sodium=sea salt, guidelines say stay under 2,500 mg daily   Get salt smart, start looking at labels, count it up.  Salt is hidden in everything, salad dressing, sauces, cheese, most canned food, any processed meat.

## 2023-12-11 ENCOUNTER — TELEPHONE (OUTPATIENT)
Dept: CARDIOLOGY | Facility: MEDICAL CENTER | Age: 63
End: 2023-12-11
Payer: COMMERCIAL

## 2023-12-11 NOTE — TELEPHONE ENCOUNTER
GOLDEN    Caller: Zainab    Topic/issue: Would like a call to discuss her upcoming PET Scan, she has some questions and concerns    Callback Number: [phone]    Thank you,   Leela DAMON

## 2023-12-13 NOTE — TELEPHONE ENCOUNTER
"Returned patient call. Patient states she is claustrophobic and she is not sure that she can have the PET scan. Advised patient this is an open type of machine and not an enclosed tube like an MRI. Patient then said that she has a shoulder that is \"bone on bone\" and she can not lay flat with her arms raised above her. She originally wanted to see what  recommends in place of a PET if she chooses not to do it however she called back and stated she is going to try to complete the PET Friday as she layed on her bed and is able to bring her arms above her head as long as her \"arms don't have to be touching at the top\". Patient will call back if she has further questions.   "

## 2023-12-15 ENCOUNTER — HOSPITAL ENCOUNTER (OUTPATIENT)
Dept: RADIOLOGY | Facility: MEDICAL CENTER | Age: 63
End: 2023-12-15
Attending: NURSE PRACTITIONER
Payer: COMMERCIAL

## 2023-12-15 DIAGNOSIS — E78.5 DYSLIPIDEMIA: ICD-10-CM

## 2023-12-15 DIAGNOSIS — R07.9 CHEST PAIN, UNSPECIFIED TYPE: ICD-10-CM

## 2023-12-15 DIAGNOSIS — I77.810 ASCENDING AORTA DILATATION (HCC): ICD-10-CM

## 2023-12-15 DIAGNOSIS — I10 ESSENTIAL HYPERTENSION: ICD-10-CM

## 2023-12-15 DIAGNOSIS — R94.31 NONSPECIFIC ABNORMAL ELECTROCARDIOGRAM (ECG) (EKG): ICD-10-CM

## 2023-12-15 DIAGNOSIS — I10 PRIMARY HYPERTENSION: ICD-10-CM

## 2023-12-15 DIAGNOSIS — E78.2 MIXED HYPERLIPIDEMIA: ICD-10-CM

## 2023-12-15 PROCEDURE — 93018 CV STRESS TEST I&R ONLY: CPT | Performed by: INTERNAL MEDICINE

## 2023-12-15 PROCEDURE — 78431 MYOCRD IMG PET RST&STRS CT: CPT | Mod: 26 | Performed by: INTERNAL MEDICINE

## 2023-12-15 PROCEDURE — 78434 AQMBF PET REST & RX STRESS: CPT | Mod: 26 | Performed by: INTERNAL MEDICINE

## 2023-12-17 NOTE — RESULT ENCOUNTER NOTE
Your diagnostic results have been reviewed. Normal stress test findings. Good news! Please follow up as scheduled. Thank you.

## 2024-01-26 ENCOUNTER — APPOINTMENT (OUTPATIENT)
Dept: CARDIOLOGY | Facility: MEDICAL CENTER | Age: 64
End: 2024-01-26
Attending: NURSE PRACTITIONER
Payer: COMMERCIAL

## 2024-01-29 ENCOUNTER — APPOINTMENT (OUTPATIENT)
Dept: CARDIOLOGY | Facility: MEDICAL CENTER | Age: 64
End: 2024-01-29
Attending: INTERNAL MEDICINE
Payer: COMMERCIAL

## 2024-04-04 SDOH — ECONOMIC STABILITY: HOUSING INSECURITY
IN THE LAST 12 MONTHS, WAS THERE A TIME WHEN YOU DID NOT HAVE A STEADY PLACE TO SLEEP OR SLEPT IN A SHELTER (INCLUDING NOW)?: PATIENT DECLINED

## 2024-04-04 SDOH — ECONOMIC STABILITY: INCOME INSECURITY: IN THE LAST 12 MONTHS, WAS THERE A TIME WHEN YOU WERE NOT ABLE TO PAY THE MORTGAGE OR RENT ON TIME?: PATIENT DECLINED

## 2024-04-04 SDOH — ECONOMIC STABILITY: TRANSPORTATION INSECURITY
IN THE PAST 12 MONTHS, HAS THE LACK OF TRANSPORTATION KEPT YOU FROM MEDICAL APPOINTMENTS OR FROM GETTING MEDICATIONS?: NO

## 2024-04-04 SDOH — ECONOMIC STABILITY: HOUSING INSECURITY: IN THE LAST 12 MONTHS, HOW MANY PLACES HAVE YOU LIVED?: 1

## 2024-04-04 SDOH — HEALTH STABILITY: PHYSICAL HEALTH: ON AVERAGE, HOW MANY MINUTES DO YOU ENGAGE IN EXERCISE AT THIS LEVEL?: 0 MIN

## 2024-04-04 SDOH — ECONOMIC STABILITY: INCOME INSECURITY: HOW HARD IS IT FOR YOU TO PAY FOR THE VERY BASICS LIKE FOOD, HOUSING, MEDICAL CARE, AND HEATING?: NOT VERY HARD

## 2024-04-04 SDOH — ECONOMIC STABILITY: FOOD INSECURITY: WITHIN THE PAST 12 MONTHS, YOU WORRIED THAT YOUR FOOD WOULD RUN OUT BEFORE YOU GOT MONEY TO BUY MORE.: PATIENT DECLINED

## 2024-04-04 SDOH — ECONOMIC STABILITY: HOUSING INSECURITY
IN THE LAST 12 MONTHS, WAS THERE A TIME WHEN YOU DID NOT HAVE A STEADY PLACE TO SLEEP OR SLEPT IN A SHELTER (INCLUDING NOW)?: NO

## 2024-04-04 SDOH — HEALTH STABILITY: MENTAL HEALTH
STRESS IS WHEN SOMEONE FEELS TENSE, NERVOUS, ANXIOUS, OR CAN'T SLEEP AT NIGHT BECAUSE THEIR MIND IS TROUBLED. HOW STRESSED ARE YOU?: ONLY A LITTLE

## 2024-04-04 SDOH — ECONOMIC STABILITY: TRANSPORTATION INSECURITY
IN THE PAST 12 MONTHS, HAS LACK OF TRANSPORTATION KEPT YOU FROM MEETINGS, WORK, OR FROM GETTING THINGS NEEDED FOR DAILY LIVING?: NO

## 2024-04-04 SDOH — ECONOMIC STABILITY: TRANSPORTATION INSECURITY
IN THE PAST 12 MONTHS, HAS LACK OF RELIABLE TRANSPORTATION KEPT YOU FROM MEDICAL APPOINTMENTS, MEETINGS, WORK OR FROM GETTING THINGS NEEDED FOR DAILY LIVING?: NO

## 2024-04-04 SDOH — HEALTH STABILITY: PHYSICAL HEALTH: ON AVERAGE, HOW MANY DAYS PER WEEK DO YOU ENGAGE IN MODERATE TO STRENUOUS EXERCISE (LIKE A BRISK WALK)?: 0 DAYS

## 2024-04-04 SDOH — ECONOMIC STABILITY: FOOD INSECURITY: WITHIN THE PAST 12 MONTHS, THE FOOD YOU BOUGHT JUST DIDN'T LAST AND YOU DIDN'T HAVE MONEY TO GET MORE.: PATIENT DECLINED

## 2024-04-04 ASSESSMENT — LIFESTYLE VARIABLES
HOW OFTEN DO YOU HAVE A DRINK CONTAINING ALCOHOL: 2-3 TIMES A WEEK
HOW OFTEN DO YOU HAVE SIX OR MORE DRINKS ON ONE OCCASION: MONTHLY
SKIP TO QUESTIONS 9-10: 0
HOW MANY STANDARD DRINKS CONTAINING ALCOHOL DO YOU HAVE ON A TYPICAL DAY: 3 OR 4
AUDIT-C TOTAL SCORE: 6

## 2024-04-04 ASSESSMENT — SOCIAL DETERMINANTS OF HEALTH (SDOH)
HOW OFTEN DO YOU ATTENT MEETINGS OF THE CLUB OR ORGANIZATION YOU BELONG TO?: NEVER
WITHIN THE PAST 12 MONTHS, YOU WORRIED THAT YOUR FOOD WOULD RUN OUT BEFORE YOU GOT THE MONEY TO BUY MORE: PATIENT DECLINED
HOW OFTEN DO YOU GET TOGETHER WITH FRIENDS OR RELATIVES?: PATIENT DECLINED
HOW OFTEN DO YOU GET TOGETHER WITH FRIENDS OR RELATIVES?: PATIENT DECLINED
HOW OFTEN DO YOU ATTEND CHURCH OR RELIGIOUS SERVICES?: NEVER
IN A TYPICAL WEEK, HOW MANY TIMES DO YOU TALK ON THE PHONE WITH FAMILY, FRIENDS, OR NEIGHBORS?: PATIENT DECLINED
HOW OFTEN DO YOU ATTENT MEETINGS OF THE CLUB OR ORGANIZATION YOU BELONG TO?: NEVER
HOW MANY DRINKS CONTAINING ALCOHOL DO YOU HAVE ON A TYPICAL DAY WHEN YOU ARE DRINKING: 3 OR 4
HOW OFTEN DO YOU HAVE SIX OR MORE DRINKS ON ONE OCCASION: MONTHLY
IN A TYPICAL WEEK, HOW MANY TIMES DO YOU TALK ON THE PHONE WITH FAMILY, FRIENDS, OR NEIGHBORS?: PATIENT DECLINED
HOW OFTEN DO YOU ATTEND CHURCH OR RELIGIOUS SERVICES?: NEVER
HOW OFTEN DO YOU HAVE A DRINK CONTAINING ALCOHOL: 2-3 TIMES A WEEK
HOW HARD IS IT FOR YOU TO PAY FOR THE VERY BASICS LIKE FOOD, HOUSING, MEDICAL CARE, AND HEATING?: NOT VERY HARD
DO YOU BELONG TO ANY CLUBS OR ORGANIZATIONS SUCH AS CHURCH GROUPS UNIONS, FRATERNAL OR ATHLETIC GROUPS, OR SCHOOL GROUPS?: NO
DO YOU BELONG TO ANY CLUBS OR ORGANIZATIONS SUCH AS CHURCH GROUPS UNIONS, FRATERNAL OR ATHLETIC GROUPS, OR SCHOOL GROUPS?: NO

## 2024-04-08 ENCOUNTER — OFFICE VISIT (OUTPATIENT)
Dept: MEDICAL GROUP | Facility: MEDICAL CENTER | Age: 64
End: 2024-04-08
Payer: COMMERCIAL

## 2024-04-08 VITALS
OXYGEN SATURATION: 95 % | DIASTOLIC BLOOD PRESSURE: 100 MMHG | HEIGHT: 65 IN | WEIGHT: 200 LBS | HEART RATE: 94 BPM | BODY MASS INDEX: 33.32 KG/M2 | SYSTOLIC BLOOD PRESSURE: 152 MMHG | TEMPERATURE: 97.2 F

## 2024-04-08 DIAGNOSIS — Z76.89 ENCOUNTER TO ESTABLISH CARE: ICD-10-CM

## 2024-04-08 DIAGNOSIS — Z12.12 SCREENING FOR COLORECTAL CANCER: ICD-10-CM

## 2024-04-08 DIAGNOSIS — Z11.4 ENCOUNTER FOR SCREENING FOR HIV: ICD-10-CM

## 2024-04-08 DIAGNOSIS — Z12.31 SCREENING MAMMOGRAM FOR BREAST CANCER: ICD-10-CM

## 2024-04-08 DIAGNOSIS — Z72.0 TOBACCO USE: ICD-10-CM

## 2024-04-08 DIAGNOSIS — E03.4 HYPOTHYROIDISM DUE TO ACQUIRED ATROPHY OF THYROID: ICD-10-CM

## 2024-04-08 DIAGNOSIS — E78.2 MIXED HYPERLIPIDEMIA: ICD-10-CM

## 2024-04-08 DIAGNOSIS — R07.9 CHEST PAIN, UNSPECIFIED TYPE: ICD-10-CM

## 2024-04-08 DIAGNOSIS — I10 ESSENTIAL HYPERTENSION: ICD-10-CM

## 2024-04-08 DIAGNOSIS — Z12.11 SCREENING FOR COLORECTAL CANCER: ICD-10-CM

## 2024-04-08 PROBLEM — F10.10 ALCOHOL ABUSE: Status: ACTIVE | Noted: 2023-05-27

## 2024-04-08 PROCEDURE — 3077F SYST BP >= 140 MM HG: CPT

## 2024-04-08 PROCEDURE — 3080F DIAST BP >= 90 MM HG: CPT

## 2024-04-08 PROCEDURE — 99214 OFFICE O/P EST MOD 30 MIN: CPT

## 2024-04-08 RX ORDER — AMLODIPINE BESYLATE 5 MG/1
5 TABLET ORAL DAILY
Qty: 90 TABLET | Refills: 3 | Status: SHIPPED | OUTPATIENT
Start: 2024-04-08

## 2024-04-08 RX ORDER — BENAZEPRIL HYDROCHLORIDE 40 MG/1
40 TABLET ORAL DAILY
Qty: 90 TABLET | Refills: 3 | Status: SHIPPED | OUTPATIENT
Start: 2024-04-08

## 2024-04-08 RX ORDER — AMOXICILLIN 500 MG/1
CAPSULE ORAL
COMMUNITY
Start: 2024-03-14 | End: 2024-04-08

## 2024-04-08 RX ORDER — VARENICLINE TARTRATE 0.5 (11)-1
KIT ORAL
Qty: 53 EACH | Refills: 0 | Status: SHIPPED | OUTPATIENT
Start: 2024-04-08

## 2024-04-08 RX ORDER — ATORVASTATIN CALCIUM 80 MG/1
80 TABLET, FILM COATED ORAL NIGHTLY
Qty: 90 TABLET | Refills: 3 | Status: SHIPPED | OUTPATIENT
Start: 2024-04-08

## 2024-04-08 RX ORDER — VARENICLINE TARTRATE 1 MG/1
1 TABLET, FILM COATED ORAL 2 TIMES DAILY
Qty: 60 TABLET | Refills: 3 | Status: SHIPPED | OUTPATIENT
Start: 2024-05-08

## 2024-04-08 RX ORDER — HYDROCHLOROTHIAZIDE 25 MG/1
25 TABLET ORAL DAILY
Qty: 90 TABLET | Refills: 3 | Status: SHIPPED | OUTPATIENT
Start: 2024-04-08

## 2024-04-08 ASSESSMENT — PATIENT HEALTH QUESTIONNAIRE - PHQ9
9. THOUGHTS THAT YOU WOULD BE BETTER OFF DEAD, OR OF HURTING YOURSELF: NOT AT ALL
7. TROUBLE CONCENTRATING ON THINGS, SUCH AS READING THE NEWSPAPER OR WATCHING TELEVISION: NOT AT ALL
2. FEELING DOWN, DEPRESSED, IRRITABLE, OR HOPELESS: NOT AT ALL
8. MOVING OR SPEAKING SO SLOWLY THAT OTHER PEOPLE COULD HAVE NOTICED. OR THE OPPOSITE, BEING SO FIGETY OR RESTLESS THAT YOU HAVE BEEN MOVING AROUND A LOT MORE THAN USUAL: NOT AT ALL
5. POOR APPETITE OR OVEREATING: NOT AT ALL
1. LITTLE INTEREST OR PLEASURE IN DOING THINGS: NOT AT ALL
SUM OF ALL RESPONSES TO PHQ QUESTIONS 1-9: 0
3. TROUBLE FALLING OR STAYING ASLEEP OR SLEEPING TOO MUCH: NOT AT ALL
6. FEELING BAD ABOUT YOURSELF - OR THAT YOU ARE A FAILURE OR HAVE LET YOURSELF OR YOUR FAMILY DOWN: NOT AL ALL
SUM OF ALL RESPONSES TO PHQ9 QUESTIONS 1 AND 2: 0
4. FEELING TIRED OR HAVING LITTLE ENERGY: NOT AT ALL

## 2024-04-08 ASSESSMENT — ENCOUNTER SYMPTOMS
COUGH: 0
SHORTNESS OF BREATH: 0
PALPITATIONS: 0
CHILLS: 0
ORTHOPNEA: 0
FEVER: 0

## 2024-04-08 ASSESSMENT — FIBROSIS 4 INDEX: FIB4 SCORE: 1.06

## 2024-04-08 NOTE — PROGRESS NOTES
Subjective:     Verbal consent was acquired by the patient to use [employer] ambient listening note generation during this visit Yes    CC: Establish Care (Pt would like to discuss BP, Pt would like help with quitting smoking.)      HPI:   Zainab is a 64 y.o. female who presents today for:    History of Present Illness  The patient presents f to establish care and for evaluation of multiple medical concerns.    Tobacco abuse: She began smoking in 1979, a habit she quit for a decade before resuming. Currently, she smokes between 1.5 packs of cigarettes daily. Previous attempts to quit smoking with nicotine patches were unsuccessful due to skin welts and financial constraints. Wellbutrin was also trialed for her depression, but it did not aid in smoking cessation.The patient states that she is a former methamphetamine user for 25 years, ceased in 2003.  Patient interested in trialing other medication to help with tobacco cessation.    Hypertension: Over the past 5 to 6 months, the patient has been attempting a dental procedure, however, her blood pressure readings have been elevated during each dental visit. The dentist has advised against dental procedures unless her diastolic reading falls below 100. A dental appointment is scheduled for tomorrow. Her cardiologist increased her benazepril dosage to 40 mg, when she was seen in December.  She has been on benazepril for several years. She has previously taken hydrochlorothiazide. Amlodipine has been trialed in the past, but she is uncertain of its efficacy. She denies experiencing chest pain, shortness of breath, or leg swelling today, but reports occasional leg swelling today. She states she is seeing a cardiology for further evaluation of her chest pain.  She has had chest pain in the past.  Patient also noted to have a murmur.  She was scheduled to have a echo gram scan, but did not go to her appointment.  She states that she needs to reschedule  "it      Hypothyroidism: She has a history of hypothyroidism but is not on any medication. She has not been checked in a long time.          Allergies: Patient has no known allergies.     Medications:   Current Outpatient Medications:     [START ON 5/8/2024] varenicline (CHANTIX CONTINUING MONTH DILLAN) 1 MG tablet, Take 1 Tablet by mouth 2 times a day., Disp: 60 Tablet, Rfl: 3    Varenicline Tartrate, Starter, (CHANTIX STARTING MONTH DILLAN) 0.5 MG X 11 & 1 MG X 42 Tablet Therapy Pack, Days 1-3: 0.5 mg once daily. Days 4-7: 0.5 mg twice daily. Then 1 mg twice daily., Disp: 53 Each, Rfl: 0    hydroCHLOROthiazide 25 MG Tab, Take 1 Tablet by mouth every day., Disp: 90 Tablet, Rfl: 3    benazepril (LOTENSIN) 40 MG tablet, Take 1 Tablet by mouth every day., Disp: 90 Tablet, Rfl: 3    atorvastatin (LIPITOR) 80 MG tablet, Take 1 Tablet by mouth every evening., Disp: 90 Tablet, Rfl: 3    amLODIPine (NORVASC) 5 MG Tab, Take 1 Tablet by mouth every day., Disp: 90 Tablet, Rfl: 3    aspirin 81 MG EC tablet, Take 1 Tablet by mouth every day., Disp: 100 Tablet, Rfl: 3    acetaminophen (TYLENOL) 500 MG Tab, Take 1,000-1,500 mg by mouth every 6 hours as needed. Indications: Pain, Disp: , Rfl:       ROS:  Review of Systems   Constitutional:  Negative for chills and fever.   Respiratory:  Negative for cough and shortness of breath.    Cardiovascular:  Negative for chest pain, palpitations, orthopnea and leg swelling.       Objective:     Exam:  BP (!) 152/100   Pulse 94   Temp 36.2 °C (97.2 °F) (Temporal)   Ht 1.651 m (5' 5\")   Wt 90.7 kg (200 lb)   SpO2 95%   BMI 33.28 kg/m²  Body mass index is 33.28 kg/m².    Physical Exam  Constitutional:       Appearance: Normal appearance.   Eyes:      Pupils: Pupils are equal, round, and reactive to light.   Cardiovascular:      Rate and Rhythm: Normal rate and regular rhythm.      Pulses: Normal pulses.      Heart sounds: Normal heart sounds.   Pulmonary:      Effort: Pulmonary effort is " normal.      Breath sounds: Normal breath sounds.   Abdominal:      General: Bowel sounds are normal.      Palpations: Abdomen is soft.   Neurological:      Mental Status: She is alert and oriented to person, place, and time.   Psychiatric:         Mood and Affect: Mood normal.         Behavior: Behavior normal.           Assessment & Plan:     Zainab lópez 64 y.o. female with the following -       1. Encounter to establish care  Stable    2. Tobacco use  Chronic, unstable  Will start Chantix.  - varenicline (CHANTIX CONTINUING MONTH DILLAN) 1 MG tablet; Take 1 Tablet by mouth 2 times a day.  Dispense: 60 Tablet; Refill: 3  - Varenicline Tartrate, Starter, (CHANTIX STARTING MONTH DILLAN) 0.5 MG X 11 & 1 MG X 42 Tablet Therapy Pack; Days 1-3: 0.5 mg once daily. Days 4-7: 0.5 mg twice daily. Then 1 mg twice daily.  Dispense: 53 Each; Refill: 0    3. Hypothyroidism due to acquired atrophy of thyroid  Chronic, stable  - TSH WITH REFLEX TO FT4; Future    4. Chest pain, unspecified type  5. Essential hypertension  Chronic, unstable  Amlodipine 5 mg has been prescribed. We discussed that if her blood pressure remains elevated despite starting amlodipine she can take an additional pill, up to 10 mg before her dental procedure. She will schedule a follow-up appointment will be scheduled in 1 to 2 weeks.  - Comp Metabolic Panel; Future  - CBC WITHOUT DIFFERENTIAL; Future  - hydroCHLOROthiazide 25 MG Tab; Take 1 Tablet by mouth every day.  Dispense: 90 Tablet; Refill: 3  - benazepril (LOTENSIN) 40 MG tablet; Take 1 Tablet by mouth every day.  Dispense: 90 Tablet; Refill: 3  - atorvastatin (LIPITOR) 80 MG tablet; Take 1 Tablet by mouth every evening.  Dispense: 90 Tablet; Refill: 3  - amLODIPine (NORVASC) 5 MG Tab; Take 1 Tablet by mouth every day.  Dispense: 90 Tablet; Refill: 3    6. Mixed hyperlipidemia  Chronic, unstable  Continue to work on diet and exercise to help with cholesterol management.  - Lipid Profile; Future  -  atorvastatin (LIPITOR) 80 MG tablet; Take 1 Tablet by mouth every evening.  Dispense: 90 Tablet; Refill: 3    7. Encounter for screening for HIV  - HIV AG/AB COMBO ASSAY SCREENING; Future    8. Screening mammogram for breast cancer  - MA-SCREENING MAMMO BILAT W/TOMOSYNTHESIS W/CAD; Future    9. Screening for colorectal cancer  - COLOGUARD (FIT DNA)        Anticipatory guidance included the following: Patient counseled about skin care, diet, supplements, smoking, drugs/alcohol use, safe sex and exercise.     Return in about 4 weeks (around 5/6/2024), or if symptoms worsen or fail to improve.    Please note that this dictation was created using voice recognition software. I have made every reasonable attempt to correct obvious errors, but I expect that there are errors of grammar and possibly content that I did not discover before finalizing the note.

## 2024-04-15 PROBLEM — M19.011 OSTEOARTHRITIS OF RIGHT SHOULDER REGION: Status: ACTIVE | Noted: 2024-04-15

## 2024-05-09 ENCOUNTER — APPOINTMENT (OUTPATIENT)
Dept: MEDICAL GROUP | Facility: MEDICAL CENTER | Age: 64
End: 2024-05-09
Payer: COMMERCIAL

## 2024-11-07 NOTE — PATIENT INSTRUCTIONS
Please bring the blood pressure log with you on your next visit.  Please stop taking the Omeprazole and monitor for any acid reflux symptoms.  Please stop taking the Wellbutrin 150 mg tablet.    Obesity  Obesity is defined as having too much total body fat and a body mass index (BMI) of 30 or more. BMI is an estimate of body fat and is calculated from your height and weight. BMI is typically calculated by your health care provider during regular wellness visits. Obesity happens when you consume more calories than you can burn by exercising or performing daily physical tasks. Prolonged obesity can cause major illnesses or emergencies, such as:  · Stroke.  · Heart disease.  · Diabetes.  · Cancer.  · Arthritis.  · High blood pressure (hypertension).  · High cholesterol.  · Sleep apnea.  · Erectile dysfunction.  · Infertility problems.  CAUSES   · Regularly eating unhealthy foods.  · Physical inactivity.  · Certain disorders, such as an underactive thyroid (hypothyroidism), Cushing's syndrome, and polycystic ovarian syndrome.  · Certain medicines, such as steroids, some depression medicines, and antipsychotics.  · Genetics.  · Lack of sleep.  DIAGNOSIS  A health care provider can diagnose obesity after calculating your BMI. Obesity will be diagnosed if your BMI is 30 or higher.  There are other methods of measuring obesity levels. Some other methods include measuring your skinfold thickness, your waist circumference, and comparing your hip circumference to your waist circumference.  TREATMENT   A healthy treatment program includes some or all of the following:  · Long-term dietary changes.  · Exercise and physical activity.  · Behavioral and lifestyle changes.  · Medicine only under the supervision of your health care provider. Medicines may help, but only if they are used with diet and exercise programs.  If your BMI is 40 or higher, your health care provider may recommend specialized surgery or programs to help with  Rooming notes are reviewed and accepted.     Subjective:  This 73 year old male presents to the clinic today with his wife, Xochitl, who he consents to being in the room during treatment.  Patient relates that he was seen at [facility] on 11/3/2024; due to pain to the left ankle and foot.  He had xrays taken and was diagnosed with sprain to the foot and ankle.  Was given cam walker and has been wearing this with some improvement.  He has what appears to be bruise to the inner ankle left that may be from the boot.      HPI:  Patient relates that he was preparing his boat for winter. He was stepping up onto the boat and off of the boat and thinks this caused the swelling and pain.  He relates that the pain started the day after working on the boat.      Objective:  Patient presents to clinic alert, pleasant, cooperative, in no acute distress.  Pedal pulses are palpable to left foot graded at 2/4 for DP and 2/4 for PT.  Skin temperature is warm to warm, proximal ankle to distal toes bilateral foot.  Hair is seen dorsally on left foot.  Moderate edema, pitting, to the left ankle and dorsal left foot.      He has what appears to be ecchymosis and minor erythema to the medial distal tibia at the level of the ankle.  This region is tender with palpation. It appears that the inflated baffles of the boot would cushion this region and not certain why there is ecchymosis and erythema.  There is tenderness, minor, to the fifth metatarsal base left foot at the insertion of the peroneal brevis tendon.  He is able to dorsiflex, plantar flex, invert and christopher left ankle against resistance.  No pain with ROM of the MTPJs 1-5 bilateral foot. No pain with palpation to the metatarsals or dorsal metatarsal tarsal joints left foot.   Pain with palpation to the deltoid ligament inferior to the medial malleolus left ankle.  Tenderness with palpation to the lateral collateral ligaments left ankle.     Assessment:  Sprain and strain of left  ankle  Acute gout of left ankle, unspecified cause  Pain and swelling of left ankle     Plan:  Patient questions if this could be gout. Discussed that this does not have appearance of gout as there is no increased skin temperature or bright red color of the skin but would like to proceed with serum uric acid level.  Order placed and will contact him with results when available.     Currently there is tenderness to the peroneal brevis tendon, the deltoid ligament and lateral collateral ligaments of left ankle.  Recommend ace wrap or compression ankle brace within cam walker at all times. He will continue with ibuprofen 800 mg twice daily with food.      Patient is to return to clinic for follow up in 3 weeks, sooner if problems arise.    weight loss.  An unhealthy treatment program includes:  · Fasting.  · Fad diets.  · Supplements and drugs.  These choices do not succeed in long-term weight control.  HOME CARE INSTRUCTIONS  · Exercise and perform physical activity as directed by your health care provider. To increase physical activity, try the following:  ¨ Use stairs instead of elevators.  ¨ Park farther away from store entrances.  ¨ Garden, bike, or walk instead of watching television or using the computer.  · Eat healthy, low-calorie foods and drinks on a regular basis. Eat more fruits and vegetables. Use low-calorie cookbooks or take healthy cooking classes.  · Limit fast food, sweets, and processed snack foods.  · Eat smaller portions.  · Keep a daily journal of everything you eat. There are many free websites to help you with this. It may be helpful to measure your foods so you can determine if you are eating the correct portion sizes.  · Avoid drinking alcohol. Drink more water and drinks without calories.  · Take vitamins and supplements only as recommended by your health care provider.  · Weight-loss support groups, registered dietitians, counselors, and stress reduction education can also be very helpful.  SEEK IMMEDIATE MEDICAL CARE IF:  · You have chest pain or tightness.  · You have trouble breathing or feel short of breath.  · You have weakness or leg numbness.  · You feel confused or have trouble talking.  · You have sudden changes in your vision.     This information is not intended to replace advice given to you by your health care provider. Make sure you discuss any questions you have with your health care provider.     Document Released: 01/25/2006 Document Revised: 01/08/2016 Document Reviewed: 01/23/2013  [facility] [facility] Interactive Patient Education ©2016 [employer] [facility] [employer]      Osteoarthritis  Osteoarthritis is a disease that causes soreness and inflammation of a joint. It occurs when the cartilage at the affected joint wears down.  Cartilage acts as a cushion, covering the ends of bones where they meet to form a joint. Osteoarthritis is the most common form of arthritis. It often occurs in older people. The joints affected most often by this condition include those in the:  · Ends of the fingers.  · Thumbs.  · Neck.  · Lower back.  · Knees.  · Hips.  CAUSES   Over time, the cartilage that covers the ends of bones begins to wear away. This causes bone to rub on bone, producing pain and stiffness in the affected joints.   RISK FACTORS  Certain factors can increase your chances of having osteoarthritis, including:  · Older age.  · Excessive body weight.  · Overuse of joints.  · Previous joint injury.  SIGNS AND SYMPTOMS   · Pain, swelling, and stiffness in the joint.  · Over time, the joint may lose its normal shape.  · Small deposits of bone (osteophytes) may grow on the edges of the joint.  · Bits of bone or cartilage can break off and float inside the joint space. This may cause more pain and damage.  DIAGNOSIS   Your health care provider will do a physical exam and ask about your symptoms. Various tests may be ordered, such as:  · X-rays of the affected joint.  · Blood tests to rule out other types of arthritis.  Additional tests may be used to diagnose your condition.  TREATMENT   Goals of treatment are to control pain and improve joint function. Treatment plans may include:  · A prescribed exercise program that allows for rest and joint relief.  · A weight control plan.  · Pain relief techniques, such as:  ¨ Properly applied heat and cold.  ¨ Electric pulses delivered to nerve endings under the skin (transcutaneous electrical nerve stimulation [TENS]).  ¨ Massage.  ¨ Certain nutritional supplements.  · Medicines to control pain, such as:  ¨ Acetaminophen.  ¨ Nonsteroidal anti-inflammatory drugs (NSAIDs), such as naproxen.  ¨ Narcotic or central-acting agents, such as tramadol.  ¨ Corticosteroids. These can be given orally or as an  injection.  · Surgery to reposition the bones and relieve pain (osteotomy) or to remove loose pieces of bone and cartilage. Joint replacement may be needed in advanced states of osteoarthritis.  HOME CARE INSTRUCTIONS   · Take medicines only as directed by your health care provider.  · Maintain a healthy weight. Follow your health care provider's instructions for weight control. This may include dietary instructions.  · Exercise as directed. Your health care provider can recommend specific types of exercise. These may include:  ¨ Strengthening exercises. These are done to strengthen the muscles that support joints affected by arthritis. They can be performed with weights or with exercise bands to add resistance.  ¨ Aerobic activities. These are exercises, such as brisk walking or low-impact aerobics, that get your heart pumping.  ¨ Range-of-motion activities. These keep your joints limber.  ¨ Balance and agility exercises. These help you maintain daily living skills.  · Rest your affected joints as directed by your health care provider.  · Keep all follow-up visits as directed by your health care provider.  SEEK MEDICAL CARE IF:   · Your skin turns red.  · You develop a rash in addition to your joint pain.  · You have worsening joint pain.  · You have a fever along with joint or muscle aches.  SEEK IMMEDIATE MEDICAL CARE IF:  · You have a significant loss of weight or appetite.  · You have night sweats.  FOR MORE INFORMATION   · National [place] of Arthritis and Musculoskeletal and Skin Diseases: www.niams.nih.gov  · National [place] on Aging: www.michael.nih.gov  · American College of Rheumatology: www.rheumatology.org     This information is not intended to replace advice given to you by your health care provider. Make sure you discuss any questions you have with your health care provider.     Document Released: 12/18/2006 Document Revised: 01/08/2016 Document Reviewed: 08/25/2014  [employer] Interactive Patient  Education ©2016 [employer]        Hypertension  Hypertension, commonly called high blood pressure, is when the force of blood pumping through your arteries is too strong. Your arteries are the blood vessels that carry blood from your heart throughout your body. A blood pressure reading consists of a higher number over a lower number, such as 110/72. The higher number (systolic) is the pressure inside your arteries when your heart pumps. The lower number (diastolic) is the pressure inside your arteries when your heart relaxes. Ideally you want your blood pressure below 120/80.  Hypertension forces your heart to work harder to pump blood. Your arteries may become narrow or stiff. Having untreated or uncontrolled hypertension can cause heart attack, stroke, kidney disease, and other problems.  RISK FACTORS  Some risk factors for high blood pressure are controllable. Others are not.   Risk factors you cannot control include:   · Race. You may be at higher risk if you are .  · Age. Risk increases with age.  · Gender. Men are at higher risk than women before age 45 years. After age 65, women are at higher risk than men.  Risk factors you can control include:  · Not getting enough exercise or physical activity.  · Being overweight.  · Getting too much fat, sugar, calories, or salt in your diet.  · Drinking too much alcohol.  SIGNS AND SYMPTOMS  Hypertension does not usually cause signs or symptoms. Extremely high blood pressure (hypertensive crisis) may cause headache, anxiety, shortness of breath, and nosebleed.  DIAGNOSIS  To check if you have hypertension, your health care provider will measure your blood pressure while you are seated, with your arm held at the level of your heart. It should be measured at least twice using the same arm. Certain conditions can cause a difference in blood pressure between your right and left arms. A blood pressure reading that is higher than normal on one occasion does  not mean that you need treatment. If it is not clear whether you have high blood pressure, you may be asked to return on a different day to have your blood pressure checked again. Or, you may be asked to monitor your blood pressure at home for 1 or more weeks.  TREATMENT  Treating high blood pressure includes making lifestyle changes and possibly taking medicine. Living a healthy lifestyle can help lower high blood pressure. You may need to change some of your habits.  Lifestyle changes may include:  · Following the DASH diet. This diet is high in fruits, vegetables, and whole grains. It is low in salt, red meat, and added sugars.  · Keep your sodium intake below 2,300 mg per day.  · Getting at least 30-45 minutes of aerobic exercise at least 4 times per week.  · Losing weight if necessary.  · Not smoking.  · Limiting alcoholic beverages.  · Learning ways to reduce stress.  Your health care provider may prescribe medicine if lifestyle changes are not enough to get your blood pressure under control, and if one of the following is true:  · You are 18-59 years of age and your systolic blood pressure is above 140.  · You are 60 years of age or older, and your systolic blood pressure is above 150.  · Your diastolic blood pressure is above 90.  · You have diabetes, and your systolic blood pressure is over 140 or your diastolic blood pressure is over 90.  · You have kidney disease and your blood pressure is above 140/90.  · You have heart disease and your blood pressure is above 140/90.  Your personal target blood pressure may vary depending on your medical conditions, your age, and other factors.  HOME CARE INSTRUCTIONS  · Have your blood pressure rechecked as directed by your health care provider.    · Take medicines only as directed by your health care provider. Follow the directions carefully. Blood pressure medicines must be taken as prescribed. The medicine does not work as well when you skip doses. Skipping doses  also puts you at risk for problems.  · Do not smoke.    · Monitor your blood pressure at home as directed by your health care provider.   SEEK MEDICAL CARE IF:   · You think you are having a reaction to medicines taken.  · You have recurrent headaches or feel dizzy.  · You have swelling in your ankles.  · You have trouble with your vision.  SEEK IMMEDIATE MEDICAL CARE IF:  · You develop a severe headache or confusion.  · You have unusual weakness, numbness, or feel faint.  · You have severe chest or abdominal pain.  · You vomit repeatedly.  · You have trouble breathing.  MAKE SURE YOU:   · Understand these instructions.  · Will watch your condition.  · Will get help right away if you are not doing well or get worse.     This information is not intended to replace advice given to you by your health care provider. Make sure you discuss any questions you have with your health care provider.     Document Released: 12/18/2006 Document Revised: 05/03/2016 Document Reviewed: 10/10/2014  [employer] Interactive Patient Education ©2016 [employer]

## 2024-11-21 DIAGNOSIS — Z72.0 TOBACCO USE: ICD-10-CM

## 2024-11-21 RX ORDER — VARENICLINE TARTRATE 1 MG/1
1 TABLET, FILM COATED ORAL 2 TIMES DAILY
Qty: 60 TABLET | Refills: 3 | Status: SHIPPED | OUTPATIENT
Start: 2024-11-21

## 2024-12-16 ENCOUNTER — HOSPITAL ENCOUNTER (EMERGENCY)
Facility: MEDICAL CENTER | Age: 64
End: 2024-12-16
Attending: EMERGENCY MEDICINE
Payer: COMMERCIAL

## 2024-12-16 ENCOUNTER — APPOINTMENT (OUTPATIENT)
Dept: URGENT CARE | Facility: CLINIC | Age: 64
End: 2024-12-16
Payer: COMMERCIAL

## 2024-12-16 VITALS
DIASTOLIC BLOOD PRESSURE: 124 MMHG | SYSTOLIC BLOOD PRESSURE: 202 MMHG | TEMPERATURE: 98.5 F | BODY MASS INDEX: 30.96 KG/M2 | OXYGEN SATURATION: 94 % | HEART RATE: 99 BPM | HEIGHT: 65 IN | WEIGHT: 185.85 LBS | RESPIRATION RATE: 16 BRPM

## 2024-12-16 DIAGNOSIS — K64.9 HEMORRHOIDS, UNSPECIFIED HEMORRHOID TYPE: ICD-10-CM

## 2024-12-16 LAB
ALBUMIN SERPL BCP-MCNC: 4.2 G/DL (ref 3.2–4.9)
ALBUMIN/GLOB SERPL: 1.3 G/DL
ALP SERPL-CCNC: 104 U/L (ref 30–99)
ALT SERPL-CCNC: 21 U/L (ref 2–50)
ANION GAP SERPL CALC-SCNC: 15 MMOL/L (ref 7–16)
AST SERPL-CCNC: 19 U/L (ref 12–45)
BASOPHILS # BLD AUTO: 0.5 % (ref 0–1.8)
BASOPHILS # BLD: 0.04 K/UL (ref 0–0.12)
BILIRUB SERPL-MCNC: 0.5 MG/DL (ref 0.1–1.5)
BUN SERPL-MCNC: 12 MG/DL (ref 8–22)
CALCIUM ALBUM COR SERPL-MCNC: 9.2 MG/DL (ref 8.5–10.5)
CALCIUM SERPL-MCNC: 9.4 MG/DL (ref 8.4–10.2)
CHLORIDE SERPL-SCNC: 104 MMOL/L (ref 96–112)
CO2 SERPL-SCNC: 20 MMOL/L (ref 20–33)
CREAT SERPL-MCNC: 0.66 MG/DL (ref 0.5–1.4)
EOSINOPHIL # BLD AUTO: 0.88 K/UL (ref 0–0.51)
EOSINOPHIL NFR BLD: 10.3 % (ref 0–6.9)
ERYTHROCYTE [DISTWIDTH] IN BLOOD BY AUTOMATED COUNT: 39.6 FL (ref 35.9–50)
GFR SERPLBLD CREATININE-BSD FMLA CKD-EPI: 99 ML/MIN/1.73 M 2
GLOBULIN SER CALC-MCNC: 3.2 G/DL (ref 1.9–3.5)
GLUCOSE SERPL-MCNC: 101 MG/DL (ref 65–99)
HCT VFR BLD AUTO: 45.2 % (ref 37–47)
HGB BLD-MCNC: 15.9 G/DL (ref 12–16)
IMM GRANULOCYTES # BLD AUTO: 0.07 K/UL (ref 0–0.11)
IMM GRANULOCYTES NFR BLD AUTO: 0.8 % (ref 0–0.9)
INR PPP: 0.96 (ref 0.87–1.13)
LYMPHOCYTES # BLD AUTO: 2.02 K/UL (ref 1–4.8)
LYMPHOCYTES NFR BLD: 23.5 % (ref 22–41)
MCH RBC QN AUTO: 29.9 PG (ref 27–33)
MCHC RBC AUTO-ENTMCNC: 35.2 G/DL (ref 32.2–35.5)
MCV RBC AUTO: 85 FL (ref 81.4–97.8)
MONOCYTES # BLD AUTO: 0.67 K/UL (ref 0–0.85)
MONOCYTES NFR BLD AUTO: 7.8 % (ref 0–13.4)
NEUTROPHILS # BLD AUTO: 4.9 K/UL (ref 1.82–7.42)
NEUTROPHILS NFR BLD: 57.1 % (ref 44–72)
NRBC # BLD AUTO: 0 K/UL
NRBC BLD-RTO: 0 /100 WBC (ref 0–0.2)
PLATELET # BLD AUTO: 267 K/UL (ref 164–446)
PMV BLD AUTO: 8.8 FL (ref 9–12.9)
POTASSIUM SERPL-SCNC: 3.3 MMOL/L (ref 3.6–5.5)
PROT SERPL-MCNC: 7.4 G/DL (ref 6–8.2)
PROTHROMBIN TIME: 13.1 SEC (ref 12–14.6)
RBC # BLD AUTO: 5.32 M/UL (ref 4.2–5.4)
SODIUM SERPL-SCNC: 139 MMOL/L (ref 135–145)
WBC # BLD AUTO: 8.6 K/UL (ref 4.8–10.8)

## 2024-12-16 PROCEDURE — 85025 COMPLETE CBC W/AUTO DIFF WBC: CPT

## 2024-12-16 PROCEDURE — 36415 COLL VENOUS BLD VENIPUNCTURE: CPT

## 2024-12-16 PROCEDURE — 99284 EMERGENCY DEPT VISIT MOD MDM: CPT

## 2024-12-16 PROCEDURE — 85610 PROTHROMBIN TIME: CPT

## 2024-12-16 PROCEDURE — 80053 COMPREHEN METABOLIC PANEL: CPT

## 2024-12-16 ASSESSMENT — FIBROSIS 4 INDEX: FIB4 SCORE: 1.06

## 2024-12-17 NOTE — DISCHARGE INSTRUCTIONS
If you find that there is some discomfort down there or you notice swelling you could always do these baths.  This can help reduce swelling and improve bleeding but at this point since it ruptured we expect you to have a full recovery and daily washing with a shower is enough with soap and water.  It may bleed for a day more with some small spotting but at this point we expected to have full recovery quickly.

## 2024-12-17 NOTE — ED PROVIDER NOTES
"  CHIEF COMPLAINT  Chief Complaint   Patient presents with    Vaginal Bleeding     PT presents d/t vaginal bleeding \"like I'm on my period\" since . PT is concerned because she has had a hysterectomy        LIMITATION TO HISTORY   None    HPI    Zainab Guy is a 64 y.o. female  He is not on blood thinners  Is not taking aspirin  Has no significant bruising or bleeding  But has had a recent diarrhea history for about 1 week after taking 2 to 3 days of antibiotics comes in today with vaginal bleeding she describes the clot came out once it was after she thought she had had a bowel movement.  There is no leading exacerbating factors no pain.    Patient has had a hysterectomy.  She said the bleeding is painless like when she had her period.  Patient states she denies any sexual intercourse for the last decade she states that she has had no trauma.  Patient did have some abscess which was drained and near the groin area.  Patient denies any associated symptoms    OUTSIDE HISTORIAN(S):  None    EXTERNAL RECORDS REVIEWED  Find a visit for abscess recently.    REVIEW OF SYSTEMS  No fevers or chills.    PAST MEDICAL HISTORY  Past Medical History:   Diagnosis Date    Alcohol abuse     GERD (gastroesophageal reflux disease)     HTN (hypertension)     Hypercholesteremia     Hypothyroidism     Methamphetamine abuse in remission (HCC)     quit in     Obesity     Osteoarthritis        FAMILY HISTORY  Family History   Problem Relation Age of Onset    Diabetes Mother     Hypertension Mother     Hyperlipidemia Mother     Hypertension Father     Alcohol abuse Sister     Breast Cancer Maternal Aunt        SOCIAL HISTORY  Social History     Tobacco Use    Smoking status: Every Day     Current packs/day: 0.00     Average packs/day: 0.5 packs/day for 25.0 years (12.5 ttl pk-yrs)     Types: Cigarettes     Start date: 10/1979     Last attempt to quit: 10/2004     Years since quittin.2    Smokeless tobacco: Never " "  Substance Use Topics    Alcohol use: Not Currently     Alcohol/week: 3.6 oz    Drug use: No     Social History     Substance and Sexual Activity   Drug Use No       SURGICAL HISTORY  Past Surgical History:   Procedure Laterality Date    PB RECONSTR TOTAL SHOULDER IMPLANT Right 5/9/2024    Procedure: RIGHT TOTAL SHOULDER ARTHROPLASTY, RIGHT BICEPS TENODESIS, REPAIRS AS INDICATED;  Surgeon: Remberto Joe M.D.;  Location: [facility];  Service: Orthopedics    ABDOMINAL HYSTERECTOMY TOTAL      ARTHROPLASTY      ORIF, KNEE Bilateral     SHOULDER ARTHROPLASTY TOTAL Left        CURRENT MEDICATIONS  No current facility-administered medications for this encounter.    Current Outpatient Medications:     varenicline (CHANTIX CONTINUING MONTH PAK) 1 MG tablet, Take 1 Tablet by mouth 2 times a day., Disp: 60 Tablet, Rfl: 3    omeprazole (PRILOSEC) 20 MG delayed-release capsule, Take 20 mg by mouth every day., Disp: , Rfl:     aspirin (ASA) 81 MG Chew Tab chewable tablet, Chew 81 mg every day., Disp: , Rfl:     Varenicline Tartrate, Starter, (CHANTIX STARTING MONTH DILLAN) 0.5 MG X 11 & 1 MG X 42 Tablet Therapy Pack, Days 1-3: 0.5 mg once daily. Days 4-7: 0.5 mg twice daily. Then 1 mg twice daily., Disp: 53 Each, Rfl: 0    hydroCHLOROthiazide 25 MG Tab, Take 1 Tablet by mouth every day., Disp: 90 Tablet, Rfl: 3    benazepril (LOTENSIN) 40 MG tablet, Take 1 Tablet by mouth every day., Disp: 90 Tablet, Rfl: 3    atorvastatin (LIPITOR) 80 MG tablet, Take 1 Tablet by mouth every evening., Disp: 90 Tablet, Rfl: 3    amLODIPine (NORVASC) 5 MG Tab, Take 1 Tablet by mouth every day., Disp: 90 Tablet, Rfl: 3    ALLERGIES  No Known Allergies    PHYSICAL EXAM  VITAL SIGNS: BP (!) 202/124 Comment: pt did not take bp meds today  Pulse 99   Temp 36.9 °C (98.5 °F) (Temporal)   Resp 16   Ht 1.651 m (5' 5\")   Wt 84.3 kg (185 lb 13.6 oz)   SpO2 94%   BMI 30.93 kg/m²   Reviewed and elevated blood pressure noted.  Tachycardia " noted.  Constitutional: Well developed, Well nourished, no acute distress.  HENT: Normocephalic, atraumatic,   Eyes: PERRLA, conjunctiva pink, no scleral icterus.   Cardiovascular: Regular rate and rhythm. No murmurs, rubs or gallops.  No dependent edema or calf tenderness no pulsatile abdominal masses  Respiratory: Lungs clear to auscultation bilaterally. No wheezes, rales, or rhonchi.  Abdominal:  Abdomen soft, non-tender, non distended. No rebound, or guarding.    Skin: No erythema, no rash. No wounds or bruising.  Genitourinary    Done with nurse at the bedside  Pelvic exam showed no vaginal abrasions lacerations this is an with speculum exam  Rectal exam showed dried blood around the rectal area and a thrombosed hemorrhoid that was open and clotted no longer tender.            MEDICAL DECISION MAKING:  PROBLEMS EVALUATED THIS VISIT:  Vaginal bleeding.  1 episode.  Patient has had a hysterectomy.  The patient was tachycardic and hypertensive.  She does have a history of hypertension.  On exam with the nurse we noted that patient had a thrombosed hemorrhoid and blood around the rectal area.  No vaginal bleeding and pelvic exam was unremarkable      Medical decision making.  Patient has a thrombosed now ruptured hemorrhoid.  She is doing well stable with no signs of infection and elevated blood pressure noted         PLAN:  New blood pressure medication follow-up with her doctor  Return to the ER symptoms worsen  Sitz bath care was discussed         RESULTS    LABS Ordered and Reviewed by Me:  Results for orders placed or performed during the hospital encounter of 12/16/24   CBC w/ Differential    Collection Time: 12/16/24  6:37 PM   Result Value Ref Range    WBC 8.6 4.8 - 10.8 K/uL    RBC 5.32 4.20 - 5.40 M/uL    Hemoglobin 15.9 12.0 - 16.0 g/dL    Hematocrit 45.2 37.0 - 47.0 %    MCV 85.0 81.4 - 97.8 fL    MCH 29.9 27.0 - 33.0 pg    MCHC 35.2 32.2 - 35.5 g/dL    RDW 39.6 35.9 - 50.0 fL    Platelet Count 267 164  - 446 K/uL    MPV 8.8 (L) 9.0 - 12.9 fL    Neutrophils-Polys 57.10 44.00 - 72.00 %    Lymphocytes 23.50 22.00 - 41.00 %    Monocytes 7.80 0.00 - 13.40 %    Eosinophils 10.30 (H) 0.00 - 6.90 %    Basophils 0.50 0.00 - 1.80 %    Immature Granulocytes 0.80 0.00 - 0.90 %    Nucleated RBC 0.00 0.00 - 0.20 /100 WBC    Neutrophils (Absolute) 4.90 1.82 - 7.42 K/uL    Lymphs (Absolute) 2.02 1.00 - 4.80 K/uL    Monos (Absolute) 0.67 0.00 - 0.85 K/uL    Eos (Absolute) 0.88 (H) 0.00 - 0.51 K/uL    Baso (Absolute) 0.04 0.00 - 0.12 K/uL    Immature Granulocytes (abs) 0.07 0.00 - 0.11 K/uL    NRBC (Absolute) 0.00 K/uL   Complete Metabolic Panel (CMP)    Collection Time: 12/16/24  6:37 PM   Result Value Ref Range    Sodium 139 135 - 145 mmol/L    Potassium 3.3 (L) 3.6 - 5.5 mmol/L    Chloride 104 96 - 112 mmol/L    Co2 20 20 - 33 mmol/L    Anion Gap 15.0 7.0 - 16.0    Glucose 101 (H) 65 - 99 mg/dL    Bun 12 8 - 22 mg/dL    Creatinine 0.66 0.50 - 1.40 mg/dL    Calcium 9.4 8.4 - 10.2 mg/dL    Correct Calcium 9.2 8.5 - 10.5 mg/dL    AST(SGOT) 19 12 - 45 U/L    ALT(SGPT) 21 2 - 50 U/L    Alkaline Phosphatase 104 (H) 30 - 99 U/L    Total Bilirubin 0.5 0.1 - 1.5 mg/dL    Albumin 4.2 3.2 - 4.9 g/dL    Total Protein 7.4 6.0 - 8.2 g/dL    Globulin 3.2 1.9 - 3.5 g/dL    A-G Ratio 1.3 g/dL   PT/INR    Collection Time: 12/16/24  6:37 PM   Result Value Ref Range    PT 13.1 12.0 - 14.6 sec    INR 0.96 0.87 - 1.13   ESTIMATED GFR    Collection Time: 12/16/24  6:37 PM   Result Value Ref Range    GFR (CKD-EPI) 99 >60 mL/min/1.73 m 2             ED COURSE:    ED Observation Status? No   No noted need for observation for developing issue    INTERVENTIONS BY ME:    Examined with nurse was done  CONSULTANTS/OTHER GROUPS CONTACTED    Recommend that she follow-up with her primary care doctor for blood pressure    FINAL DISPO PLAN   Discharge Medication List as of 12/16/2024  7:08 PM            Followup:  [facility], Emergency  Dept  [address]  [place] [address]  [phone]  Go to   If symptoms worsen    Ailyn Matthews A.P.R.NPatrice  [address] [facility] [address]  [place] [id number] [phone]    Schedule an appointment as soon as possible for a visit   Your blood pressure      CONDITION: Noted.     FINAL IMPRESSION  1. Hemorrhoids, unspecified hemorrhoid type    .  Elevated blood pressure

## 2024-12-17 NOTE — ED NOTES
Discharge instructions given and discussed. patient educated to come back to ER for new or worsening symptoms. Instructed to follow up with PCP. Patient verbalized understanding. All IV's removed. GCS of 15. Pt walked out with steady gait.

## 2024-12-17 NOTE — ED TRIAGE NOTES
"Chief Complaint   Patient presents with    Vaginal Bleeding     PT presents d/t vaginal bleeding \"like I'm on my period\" since 1700. PT is concerned because she has had a hysterectomy      BP (!) 240/141   Pulse (!) 108   Temp 37 °C (98.6 °F) (Temporal)   Resp 20   Ht 1.651 m (5' 5\")   Wt 84.3 kg (185 lb 13.6 oz)   SpO2 92%   BMI 30.93 kg/m²     "